# Patient Record
Sex: FEMALE | Race: WHITE | NOT HISPANIC OR LATINO | Employment: FULL TIME | URBAN - METROPOLITAN AREA
[De-identification: names, ages, dates, MRNs, and addresses within clinical notes are randomized per-mention and may not be internally consistent; named-entity substitution may affect disease eponyms.]

---

## 2017-02-09 ENCOUNTER — ALLSCRIPTS OFFICE VISIT (OUTPATIENT)
Dept: OTHER | Facility: OTHER | Age: 27
End: 2017-02-09

## 2017-05-29 ENCOUNTER — HOSPITAL ENCOUNTER (EMERGENCY)
Facility: HOSPITAL | Age: 27
Discharge: HOME | End: 2017-05-29
Attending: EMERGENCY MEDICINE | Admitting: EMERGENCY MEDICINE
Payer: COMMERCIAL

## 2017-05-29 ENCOUNTER — APPOINTMENT (EMERGENCY)
Dept: RADIOLOGY | Facility: HOSPITAL | Age: 27
End: 2017-05-29
Payer: COMMERCIAL

## 2017-05-29 VITALS
OXYGEN SATURATION: 98 % | TEMPERATURE: 99.8 F | WEIGHT: 211 LBS | HEART RATE: 117 BPM | DIASTOLIC BLOOD PRESSURE: 73 MMHG | SYSTOLIC BLOOD PRESSURE: 134 MMHG | RESPIRATION RATE: 18 BRPM | HEIGHT: 63 IN | BODY MASS INDEX: 37.39 KG/M2

## 2017-05-29 DIAGNOSIS — B34.9 VIRAL SYNDROME: Primary | ICD-10-CM

## 2017-05-29 LAB
ALBUMIN SERPL BCP-MCNC: 3.5 G/DL (ref 3.5–5)
ALP SERPL-CCNC: 83 U/L (ref 46–116)
ALT SERPL W P-5'-P-CCNC: 22 U/L (ref 12–78)
ANION GAP SERPL CALCULATED.3IONS-SCNC: 12 MMOL/L (ref 4–13)
APTT PPP: 31 SECONDS (ref 23–35)
AST SERPL W P-5'-P-CCNC: 16 U/L (ref 5–45)
BASOPHILS # BLD AUTO: 0.01 THOUSANDS/ΜL (ref 0–0.1)
BASOPHILS NFR BLD AUTO: 0 % (ref 0–1)
BILIRUB SERPL-MCNC: 0.2 MG/DL (ref 0.2–1)
BUN SERPL-MCNC: 12 MG/DL (ref 5–25)
CALCIUM SERPL-MCNC: 8.8 MG/DL (ref 8.3–10.1)
CHLORIDE SERPL-SCNC: 102 MMOL/L (ref 100–108)
CO2 SERPL-SCNC: 24 MMOL/L (ref 21–32)
CREAT SERPL-MCNC: 0.91 MG/DL (ref 0.6–1.3)
EOSINOPHIL # BLD AUTO: 0.07 THOUSAND/ΜL (ref 0–0.61)
EOSINOPHIL NFR BLD AUTO: 1 % (ref 0–6)
ERYTHROCYTE [DISTWIDTH] IN BLOOD BY AUTOMATED COUNT: 12.8 % (ref 11.6–15.1)
GFR SERPL CREATININE-BSD FRML MDRD: >60 ML/MIN/1.73SQ M
GLUCOSE SERPL-MCNC: 118 MG/DL (ref 65–140)
HCT VFR BLD AUTO: 40.2 % (ref 34.8–46.1)
HGB BLD-MCNC: 14.2 G/DL (ref 11.5–15.4)
INR PPP: 0.93 (ref 0.86–1.16)
LACTATE SERPL-SCNC: 1.1 MMOL/L (ref 0.5–2)
LYMPHOCYTES # BLD AUTO: 1.58 THOUSANDS/ΜL (ref 0.6–4.47)
LYMPHOCYTES NFR BLD AUTO: 13 % (ref 14–44)
MCH RBC QN AUTO: 32.2 PG (ref 26.8–34.3)
MCHC RBC AUTO-ENTMCNC: 35.3 G/DL (ref 31.4–37.4)
MCV RBC AUTO: 91 FL (ref 82–98)
MONOCYTES # BLD AUTO: 0.6 THOUSAND/ΜL (ref 0.17–1.22)
MONOCYTES NFR BLD AUTO: 5 % (ref 4–12)
NEUTROPHILS # BLD AUTO: 10.2 THOUSANDS/ΜL (ref 1.85–7.62)
NEUTS SEG NFR BLD AUTO: 81 % (ref 43–75)
PLATELET # BLD AUTO: 297 THOUSANDS/UL (ref 149–390)
PMV BLD AUTO: 11.1 FL (ref 8.9–12.7)
POTASSIUM SERPL-SCNC: 3.7 MMOL/L (ref 3.5–5.3)
PROT SERPL-MCNC: 7.5 G/DL (ref 6.4–8.2)
PROTHROMBIN TIME: 12.3 SECONDS (ref 12.1–14.4)
RBC # BLD AUTO: 4.41 MILLION/UL (ref 3.81–5.12)
SODIUM SERPL-SCNC: 138 MMOL/L (ref 136–145)
WBC # BLD AUTO: 12.46 THOUSAND/UL (ref 4.31–10.16)

## 2017-05-29 PROCEDURE — 85610 PROTHROMBIN TIME: CPT | Performed by: EMERGENCY MEDICINE

## 2017-05-29 PROCEDURE — 96374 THER/PROPH/DIAG INJ IV PUSH: CPT

## 2017-05-29 PROCEDURE — 96361 HYDRATE IV INFUSION ADD-ON: CPT

## 2017-05-29 PROCEDURE — 80053 COMPREHEN METABOLIC PANEL: CPT | Performed by: EMERGENCY MEDICINE

## 2017-05-29 PROCEDURE — 36415 COLL VENOUS BLD VENIPUNCTURE: CPT | Performed by: EMERGENCY MEDICINE

## 2017-05-29 PROCEDURE — 71020 HB CHEST X-RAY 2VW FRONTAL&LATL: CPT

## 2017-05-29 PROCEDURE — 87040 BLOOD CULTURE FOR BACTERIA: CPT | Performed by: EMERGENCY MEDICINE

## 2017-05-29 PROCEDURE — 85025 COMPLETE CBC W/AUTO DIFF WBC: CPT | Performed by: EMERGENCY MEDICINE

## 2017-05-29 PROCEDURE — 83605 ASSAY OF LACTIC ACID: CPT | Performed by: EMERGENCY MEDICINE

## 2017-05-29 PROCEDURE — 85730 THROMBOPLASTIN TIME PARTIAL: CPT | Performed by: EMERGENCY MEDICINE

## 2017-05-29 PROCEDURE — 99285 EMERGENCY DEPT VISIT HI MDM: CPT

## 2017-05-29 RX ORDER — LEVONORGESTREL AND ETHINYL ESTRADIOL 0.15-0.03
1 KIT ORAL DAILY
COMMUNITY
End: 2018-04-12 | Stop reason: SDUPTHER

## 2017-05-29 RX ORDER — NAPROXEN 250 MG/1
250 TABLET ORAL 2 TIMES DAILY WITH MEALS
COMMUNITY
End: 2019-02-05 | Stop reason: ALTCHOICE

## 2017-05-29 RX ORDER — ACETAMINOPHEN 500 MG
1000 TABLET ORAL EVERY 6 HOURS PRN
COMMUNITY

## 2017-05-29 RX ORDER — KETOROLAC TROMETHAMINE 30 MG/ML
15 INJECTION, SOLUTION INTRAMUSCULAR; INTRAVENOUS ONCE
Status: COMPLETED | OUTPATIENT
Start: 2017-05-29 | End: 2017-05-29

## 2017-05-29 RX ORDER — ACETAMINOPHEN 325 MG/1
650 TABLET ORAL ONCE
Status: COMPLETED | OUTPATIENT
Start: 2017-05-29 | End: 2017-05-29

## 2017-05-29 RX ADMIN — KETOROLAC TROMETHAMINE 15 MG: 30 INJECTION, SOLUTION INTRAMUSCULAR at 22:39

## 2017-05-29 RX ADMIN — SODIUM CHLORIDE 1000 ML: 0.9 INJECTION, SOLUTION INTRAVENOUS at 22:12

## 2017-05-29 RX ADMIN — ACETAMINOPHEN 650 MG: 325 TABLET ORAL at 22:20

## 2017-05-31 ENCOUNTER — ALLSCRIPTS OFFICE VISIT (OUTPATIENT)
Dept: OTHER | Facility: OTHER | Age: 27
End: 2017-05-31

## 2017-06-02 LAB
CULTURE RESULT (HISTORICAL): ABNORMAL
MISCELLANEOUS LAB TEST RESULT (HISTORICAL): ABNORMAL

## 2017-06-04 LAB — BACTERIA BLD CULT: NORMAL

## 2017-06-05 ENCOUNTER — GENERIC CONVERSION - ENCOUNTER (OUTPATIENT)
Dept: OTHER | Facility: OTHER | Age: 27
End: 2017-06-05

## 2017-09-06 ENCOUNTER — ALLSCRIPTS OFFICE VISIT (OUTPATIENT)
Dept: OTHER | Facility: OTHER | Age: 27
End: 2017-09-06

## 2017-09-11 LAB
A/G RATIO (HISTORICAL): 1.6 (ref 1.2–2.2)
ALBUMIN SERPL BCP-MCNC: 4.1 G/DL (ref 3.5–5.5)
ALP SERPL-CCNC: 80 IU/L (ref 39–117)
ALT SERPL W P-5'-P-CCNC: 19 IU/L (ref 0–32)
AST SERPL W P-5'-P-CCNC: 18 IU/L (ref 0–40)
BILIRUB SERPL-MCNC: <0.2 MG/DL (ref 0–1.2)
BUN SERPL-MCNC: 8 MG/DL (ref 6–20)
BUN/CREA RATIO (HISTORICAL): 11 (ref 9–23)
CALCIUM SERPL-MCNC: 9.4 MG/DL (ref 8.7–10.2)
CHLORIDE SERPL-SCNC: 101 MMOL/L (ref 96–106)
CO2 SERPL-SCNC: 21 MMOL/L (ref 18–29)
CREAT SERPL-MCNC: 0.71 MG/DL (ref 0.57–1)
EGFR AFRICAN AMERICAN (HISTORICAL): 134 ML/MIN/1.73
EGFR-AMERICAN CALC (HISTORICAL): 116 ML/MIN/1.73
GLUCOSE SERPL-MCNC: 98 MG/DL (ref 65–99)
POTASSIUM SERPL-SCNC: 4.4 MMOL/L (ref 3.5–5.2)
SODIUM SERPL-SCNC: 140 MMOL/L (ref 134–144)
TOT. GLOBULIN, SERUM (HISTORICAL): 2.6 G/DL (ref 1.5–4.5)
TOTAL PROTEIN (HISTORICAL): 6.7 G/DL (ref 6–8.5)

## 2017-09-12 LAB
BASOPHILS # BLD AUTO: 0 %
BASOPHILS # BLD AUTO: 0 X10E3/UL (ref 0–0.2)
D-DIMER QUANTITATIVE (HISTORICAL): 0.47 MG/L FEU (ref 0–0.49)
DEPRECATED RDW RBC AUTO: 13.2 % (ref 12.3–15.4)
EOSINOPHIL # BLD AUTO: 0.1 X10E3/UL (ref 0–0.4)
EOSINOPHIL # BLD AUTO: 1 %
HCT VFR BLD AUTO: 41.1 % (ref 34–46.6)
HGB BLD-MCNC: 14.1 G/DL (ref 11.1–15.9)
IMM.GRANULOCYTES (CD4/8) (HISTORICAL): 0 %
IMM.GRANULOCYTES (CD4/8) (HISTORICAL): 0 X10E3/UL (ref 0–0.1)
LYMPHOCYTES # BLD AUTO: 2.4 X10E3/UL (ref 0.7–3.1)
LYMPHOCYTES # BLD AUTO: 28 %
MAGNESIUM SERPL-MCNC: 2 MG/DL (ref 1.6–2.3)
MCH RBC QN AUTO: 31.8 PG (ref 26.6–33)
MCHC RBC AUTO-ENTMCNC: 34.3 G/DL (ref 31.5–35.7)
MCV RBC AUTO: 93 FL (ref 79–97)
MONOCYTES # BLD AUTO: 0.4 X10E3/UL (ref 0.1–0.9)
MONOCYTES (HISTORICAL): 4 %
NEUTROPHILS # BLD AUTO: 5.6 X10E3/UL (ref 1.4–7)
NEUTROPHILS # BLD AUTO: 67 %
PLATELET # BLD AUTO: 358 X10E3/UL (ref 150–379)
RBC (HISTORICAL): 4.43 X10E6/UL (ref 3.77–5.28)
WBC # BLD AUTO: 8.5 X10E3/UL (ref 3.4–10.8)

## 2017-09-13 LAB
T4 FREE SERPL-MCNC: 9.4 UG/DL (ref 4.5–12)
TSH SERPL DL<=0.05 MIU/L-ACNC: 5.11 UIU/ML (ref 0.45–4.5)

## 2017-09-14 ENCOUNTER — GENERIC CONVERSION - ENCOUNTER (OUTPATIENT)
Dept: OTHER | Facility: OTHER | Age: 27
End: 2017-09-14

## 2017-11-17 ENCOUNTER — GENERIC CONVERSION - ENCOUNTER (OUTPATIENT)
Dept: OTHER | Facility: OTHER | Age: 27
End: 2017-11-17

## 2017-11-17 LAB — TSH SERPL DL<=0.05 MIU/L-ACNC: 2.56 UIU/ML (ref 0.45–4.5)

## 2018-01-10 NOTE — PROGRESS NOTES
Assessment    1  Bicornuate uterus (752 34) (Q51 3)   2  Encounter for initial prescription of contraceptive pills (V25 01) (Z30 011)   3  Encounter for routine gynecological examination with Papanicolaou smear of cervix   (V72 31,V76 2) (U86 754,L23  4)    Plan  Encounter for initial prescription of contraceptive pills    · Levonorgest-Eth Estrad 91-Day 0 15-0 03 &0 01 MG Oral Tablet (Seasonique); 1  TABLET DAILY   Rx By: Cristal Kim; Dispense: 0 Days ; #:1 Tablet; Refill: 3; For: Encounter for initial prescription of contraceptive pills; CONNOR = N; Sent To: [address]   · Follow-up visit in 3 months Evaluation and Treatment  Follow-up  Status: Hold For -  Scheduling  Requested for: [id number]   Ordered; For: Encounter for initial prescription of contraceptive pills; Ordered By: Cristal Kim Performed:  Due: 66VME2958  Encounter for routine gynecological examination with Papanicolaou smear of cervix    · Follow-up visit in 1 year Evaluation and Treatment  Follow-up  Status: Hold For -  Scheduling  Requested for: 29Jan2016   Ordered; For: Encounter for routine gynecological examination with Papanicolaou smear of cervix; Ordered By: Cristal Kim Performed:  Due: 97GKX9055    Discussion/Summary    1  Yearly exam-Pap smear done, self breast awareness reviewed  2  Left breast skin rash/cyst-appears to be resolving  Today, only a faint reddish discoloration is noted consistent with resolving inclusion cyst  No suspicious findings are noted  No intervention is recommended  3  Contraception-patient interested in starting contraception  She uses condoms intermittently with her  at this time  She became pregnant on NuvaRing in the past and had significant hormonal symptoms on the patch  We discussed options and the contraceptive options she was given  She has a history of bicornuate uterus, so IUD is not optimal  After some discussion, she wishes to start OCP, 91 day pill  The patient was counseled about risks of birth control pills  She denies any contraindications, including risks of blood clots, liver disease, hormone dependent tumors, migraine headaches, or hypertension  ACHES symptoms were discussed  All questions were answered  The patient will start the pill as directed, and return in 3 months time for pill check  Electronic prescription for Seasonale 1 pack refill 3 was sent to local pharmacy  She will return in 3 months as noted above for pill check  Chief Complaint    1  Visit For: Preventive General Multisystem Exam    History of Present Illness  HPI: The patient was seen today for yearly exam  She denies any complaints  The red spot on her breast seems to have faded, but is still slightly present  She is interested in contraceptive options  Review of Systems    Constitutional: No fever, no chills, feels well, no tiredness, no recent weight gain or loss  ENT: no ear ache, no loss of hearing, no nosebleeds or nasal discharge, no sore throat or hoarseness  Cardiovascular: no complaints of slow or fast heart rate, no chest pain, no palpitations, no leg claudication or lower extremity edema  Respiratory: no complaints of shortness of breath, no wheezing, no dyspnea on exertion, no orthopnea or PND  Breasts: no complaints of breast pain, breast lump or nipple discharge  Gastrointestinal: no complaints of abdominal pain, no constipation, no nausea or diarrhea, no vomiting, no bloody stools  Genitourinary: no complaints of dysuria, no incontinence, no pelvic pain, no dysmenorrhea, no vaginal discharge or abnormal vaginal bleeding  Musculoskeletal: no complaints of arthralgia, no myalgia, no joint swelling or stiffness, no limb pain or swelling  Integumentary: no complaints of skin rash or lesion, no itching or dry skin, no skin wounds  Neurological: no complaints of headache, no confusion, no numbness or tingling, no dizziness or fainting  Active Problems    1   History of self breast exam   2  Skin rash (782 1) (R21)    Past Medical History    · History of  7   · History of miscarriage (V13 29) (Z87 59)    The active problems and past medical history were reviewed and updated today  Surgical History    · History of  Section Low Transverse   · History of Dilation And Curettage    Family History    · Family history of skin cancer (V16 8) (Z80 8)    · Family history of diabetes mellitus (V18 0) (Z83 3)    Social History    · Always uses seat belt   · Caffeine use (V49 89) (F15 90)   ·    · Never a smoker   · No alcohol use   · No drug use   · No Islam beliefs   · Three children    Current Meds   1  No Reported Medications Recorded    Allergies    1  No Known Drug Allergies    Vitals   Recorded: 61UCG9438 16:37MC   Systolic 922, RUE, Sitting   Diastolic 64, RUE, Sitting   Height 5 ft 1 5 in   Weight 212 lb    BMI Calculated 39 41   BSA Calculated 1 95   LMP [id number]     Physical Exam    Constitutional   General appearance: No acute distress, well appearing and well nourished  Neck   Neck: Normal, supple, trachea midline, no masses  Thyroid: Normal, no thyromegaly  Genitourinary   External genitalia: Normal and no lesions appreciated  Vagina: Normal, no lesions or dryness appreciated  Urethra: Normal     Urethral meatus: Normal     Bladder: Normal, soft, non-tender and no prolapse or masses appreciated  Cervix: Normal, no palpable masses  without lesions or discharge or cervicitis  No CMT  Uterus: Normal, non-tender, not enlarged, and no palpable masses  midposition, top normal size, nontender, without mass  Adnexa/parametria: Normal, non-tender and no fullness or masses appreciated  Anus, perineum, and rectum: Normal sphincter tone, no masses, and no prolapse  Chest   Breasts: Normal and no dimpling or skin changes noted  very faint red discoloration is noted at 10:00 on the left breast, consistent with resolving inclusion cyst  No other suspicious findings are noted  Abdomen   Abdomen: Normal, non-tender, and no organomegaly noted  Liver and spleen: No hepatomegaly or splenomegaly  Examination for hernias: No hernias appreciated  Lymphatic   Palpation of lymph nodes in neck, axillae, groin and/or other locations: No lymphadenopathy or masses noted  Skin   Skin and subcutaneous tissue: Normal skin turgor and no rashes      Palpation of skin and subcutaneous tissue: Normal     Psychiatric   Orientation to person, place, and time: Normal     Mood and affect: Normal        Signatures   Electronically signed by : VINICIUS Richardson ; Jan 29 2016 11:44AM EST                       (Author)

## 2018-01-11 NOTE — RESULT NOTES
Verified Results  (1) TSH [id number] 11:48AM Joon Magana     Test Name Result Flag Reference   TSH 2 560 uIU/mL  0 450-4 500

## 2018-01-13 VITALS
OXYGEN SATURATION: 98 % | BODY MASS INDEX: 39.34 KG/M2 | HEIGHT: 63 IN | SYSTOLIC BLOOD PRESSURE: 132 MMHG | HEART RATE: 98 BPM | DIASTOLIC BLOOD PRESSURE: 84 MMHG | WEIGHT: 222 LBS

## 2018-01-14 VITALS
DIASTOLIC BLOOD PRESSURE: 64 MMHG | BODY MASS INDEX: 39.16 KG/M2 | HEIGHT: 63 IN | WEIGHT: 221 LBS | SYSTOLIC BLOOD PRESSURE: 122 MMHG

## 2018-01-14 VITALS
OXYGEN SATURATION: 98 % | WEIGHT: 210 LBS | RESPIRATION RATE: 16 BRPM | SYSTOLIC BLOOD PRESSURE: 130 MMHG | BODY MASS INDEX: 37.21 KG/M2 | HEIGHT: 63 IN | HEART RATE: 118 BPM | DIASTOLIC BLOOD PRESSURE: 70 MMHG

## 2018-01-14 NOTE — RESULT NOTES
Verified Results  (1) THROAT CULTURE (CULTURE, UPPER RESPIRATORY) [id number] 12:00AM Rohini Antonchet     Test Name Result Flag Reference   Upper Respiratory Culture Final report A    Result 1 Comment A    Beta hemolytic Streptococcus, group A  Heavy growth  Penicillin and ampicillin are drugs of choice for treatment of  beta-hemolytic streptococcal infections  Susceptibility testing of  penicillins and other beta-lactam agents approved by the FDA for  treatment of beta-hemolytic streptococcal infections need not be  performed routinely because nonsusceptible isolates are extremely  rare in any beta-hemolytic streptococcus and have not been reported  for Streptococcus pyogenes (group A)   (CLSI 2011)

## 2018-01-14 NOTE — RESULT NOTES
Message   Recorded as Task   Date: 06/05/2017 09:12 AM, Created By: Mike Bunch   Task Name: Call Patient with results   Assigned To:  Yeison Rodriguez   Regarding Patient: Kindra Saha, Status: Active   CommentMurphy Dust - 05 Jun 2017 9:12 AM     Patient Phone: [phone]    culture positive Strep- finish Amox   Haley Hernandez - 05 Jun 2017 9:18 AM     TASK EDITED  Lima Tashia MSG LEFT C#        Signatures   Electronically signed by : Yomaira Kenny, ; Jun 5 2017  9:19AM EST                       (Author)

## 2018-01-17 NOTE — RESULT NOTES
Verified Results  (1) COMPREHENSIVE METABOLIC PANEL [id number] 57:96AQ Edelmira Brown     Test Name Result Flag Reference   Glucose, Serum 98 mg/dL  65-99   BUN 8 mg/dL  6-20   Creatinine, Serum 0 71 mg/dL  0 57-1 00   BUN/Creatinine Ratio 11  9-23   Sodium, Serum 140 mmol/L  134-144   Potassium, Serum 4 4 mmol/L  3 5-5 2   Chloride, Serum 101 mmol/L     Carbon Dioxide, Total 21 mmol/L  18-29   Calcium, Serum 9 4 mg/dL  8 7-10 2   Protein, Total, Serum 6 7 g/dL  6 0-8 5   Albumin, Serum 4 1 g/dL  3 5-5 5   Globulin, Total 2 6 g/dL  1 5-4 5   A/G Ratio 1 6  1 2-2 2   Bilirubin, Total <0 2 mg/dL  0 0-1 2   Alkaline Phosphatase, S 80 IU/L     AST (SGOT) 18 IU/L  0-40   ALT (SGPT) 19 IU/L  0-32   eGFR If NonAfricn Am 116 mL/min/1 73  >59   eGFR If Africn Am 134 mL/min/1 73  >59     (1) CBC/PLT/DIFF [id number] 11:11AM Edelmira Ridcarito     Test Name Result Flag Reference   WBC 8 5 x10E3/uL  3 4-10 8   RBC 4 43 x10E6/uL  3 77-5 28   Hemoglobin 14 1 g/dL  11 1-15 9   Hematocrit 41 1 %  34 0-46  6   MCV 93 fL  79-97   MCH 31 8 pg  26 6-33 0   MCHC 34 3 g/dL  31 5-35 7   RDW 13 2 %  12 3-15 4   Platelets 443 T83M0/VB  150-379   Neutrophils 67 %     Lymphs 28 %     Monocytes 4 %     Eos 1 %     Basos 0 %     Neutrophils (Absolute) 5 6 x10E3/uL  1 4-7 0   Lymphs (Absolute) 2 4 x10E3/uL  0 7-3 1   Monocytes(Absolute) 0 4 x10E3/uL  0 1-0 9   Eos (Absolute) 0 1 x10E3/uL  0 0-0 4   Baso (Absolute) 0 0 x10E3/uL  0 0-0 2   Immature Granulocytes 0 %     Immature Grans (Abs) 0 0 x10E3/uL  0 0-0 1     (1) D-DIMER [id number] 11:11AM Edelmira Brown     Test Name Result Flag Reference   D-Dimer 0 47 mg/L FEU  0 00-0 49   According to the assay 's published package insert, a  normal (<0 50 mg/L FEU) D-dimer result in conjunction with a non-high  clinical probability assessment, excludes deep vein thrombosis (DVT)  and pulmonary embolism (PE) with high sensitivity    D-dimer values increase with age and this can make VTE exclusion of  an older population difficult  To address this, the [facility], based on best available evidence and recent guidelines,  recommends that clinicians use age-adjusted D-dimer thresholds in  patients greater than 48years of age with: a) a low probability of  PE who do not meet all Pulmonary Embolism Rule Out Criteria, or  b) in those with intermediate probability of PE  The formula for an  age-adjusted D-dimer cut-off is "age/100"  For example, a 61year old  patient would have an age-adjusted cut-off of 0 60 mg/L FEU and an  [de-identified]year old 0 80 mg/L FEU       (1) TSH WITH FT4 REFLEX [id number] 11:11AM Ernesto Slocumb     Test Name Result Flag Reference   TSH 5 110 uIU/mL H 0 450-4 500   Thyroxine (T4) 9 4 ug/dL  4 5-12 0     (1) MAGNESIUM [id number] 11:11AM Ernesto Slocumb     Test Name Result Flag Reference   Magnesium, Serum 2 0 mg/dL  1 6-2 3

## 2018-03-30 ENCOUNTER — OFFICE VISIT (OUTPATIENT)
Dept: FAMILY MEDICINE CLINIC | Facility: CLINIC | Age: 28
End: 2018-03-30
Payer: COMMERCIAL

## 2018-03-30 VITALS
SYSTOLIC BLOOD PRESSURE: 120 MMHG | TEMPERATURE: 98 F | BODY MASS INDEX: 37.21 KG/M2 | WEIGHT: 210 LBS | HEIGHT: 63 IN | DIASTOLIC BLOOD PRESSURE: 82 MMHG

## 2018-03-30 DIAGNOSIS — J02.9 ACUTE PHARYNGITIS, UNSPECIFIED ETIOLOGY: Primary | ICD-10-CM

## 2018-03-30 PROCEDURE — 3725F SCREEN DEPRESSION PERFORMED: CPT | Performed by: NURSE PRACTITIONER

## 2018-03-30 PROCEDURE — 99213 OFFICE O/P EST LOW 20 MIN: CPT | Performed by: NURSE PRACTITIONER

## 2018-03-30 PROCEDURE — 3008F BODY MASS INDEX DOCD: CPT | Performed by: NURSE PRACTITIONER

## 2018-03-30 RX ORDER — AMOXICILLIN AND CLAVULANATE POTASSIUM 875; 125 MG/1; MG/1
1 TABLET, FILM COATED ORAL EVERY 12 HOURS SCHEDULED
Qty: 20 TABLET | Refills: 0 | Status: SHIPPED | OUTPATIENT
Start: 2018-03-30 | End: 2018-04-09

## 2018-03-30 NOTE — PATIENT INSTRUCTIONS
Take OTC cold medications to treat symptoms  If symptoms worsen start Augmentin as directed   Augusto or return with any problems or concerns

## 2018-03-30 NOTE — PROGRESS NOTES
[facility]        NAME: Joshua Doyle is a 32 y o  female  : 1990    MRN: [id number]  DATE: 2018  TIME: 2:27 PM    Assessment and Plan   Acute pharyngitis, unspecified etiology [J02 9]  1  Acute pharyngitis, unspecified etiology  amoxicillin-clavulanate (AUGMENTIN) 875-125 mg per tablet         Patient Instructions     Patient Instructions   Take OTC cold medications to treat symptoms  If symptoms worsen start Augmentin as directed  Augusto or return with any problems or concerns          Chief Complaint     Chief Complaint   Patient presents with    Sore Throat     1-2 days         History of Present Illness       Sore throat x2 days  Sore Throat    Pertinent negatives include no abdominal pain, congestion, coughing, diarrhea, headaches, neck pain, shortness of breath, stridor or vomiting  Review of Systems   Review of Systems   Constitutional: Negative for activity change, diaphoresis, fatigue and fever  HENT: Positive for sore throat  Negative for congestion, facial swelling, hearing loss, rhinorrhea, sinus pain, sinus pressure, sneezing and voice change  Eyes: Negative for discharge and visual disturbance  Respiratory: Negative for cough, choking, chest tightness, shortness of breath, wheezing and stridor  Cardiovascular: Negative for chest pain, palpitations and leg swelling  Gastrointestinal: Negative for abdominal distention, abdominal pain, constipation, diarrhea, nausea and vomiting  Endocrine: Negative for polydipsia, polyphagia and polyuria  Genitourinary: Negative for difficulty urinating, dysuria, frequency and urgency  Musculoskeletal: Negative for arthralgias, back pain, gait problem, joint swelling, myalgias, neck pain and neck stiffness  Skin: Negative for color change, rash and wound  Neurological: Negative for dizziness, syncope, speech difficulty, weakness, light-headedness and headaches     Hematological: Negative for adenopathy  Does not bruise/bleed easily  Psychiatric/Behavioral: Negative for agitation, behavioral problems, confusion, hallucinations, sleep disturbance and suicidal ideas  The patient is not nervous/anxious  Current Medications       Current Outpatient Prescriptions:     acetaminophen (TYLENOL) 500 mg tablet, Take 1,000 mg by mouth every 6 (six) hours as needed for mild pain, Disp: , Rfl:     amoxicillin-clavulanate (AUGMENTIN) 875-125 mg per tablet, Take 1 tablet by mouth every 12 (twelve) hours for 10 days, Disp: 20 tablet, Rfl: 0    levonorgestrel-ethinyl estradiol (SEASONALE) 0 15-0 03 MG per tablet, Take 1 tablet by mouth daily, Disp: , Rfl:     naproxen (NAPROSYN) 250 mg tablet, Take 250 mg by mouth 2 (two) times a day with meals, Disp: , Rfl:     Current Allergies     Allergies as of 2018    (No Known Allergies)            The following portions of the patient's history were reviewed and updated as appropriate: allergies, current medications, past family history, past medical history, past social history, past surgical history and problem list      No past medical history on file  Past Surgical History:   Procedure Laterality Date     SECTION      x 3    DILATION AND CURETTAGE OF UTERUS         Family History   Problem Relation Age of Onset    Skin cancer Mother     Diabetes Family          Medications have been verified  Objective   /82   Temp 98 °F (36 7 °C)   Ht 5' 3" (1 6 m)   Wt 95 3 kg (210 lb)   BMI 37 20 kg/m²        Physical Exam     Physical Exam   Constitutional: She is oriented to person, place, and time  She appears well-developed and well-nourished  No distress  HENT:   Head: Normocephalic and atraumatic  Right Ear: Tympanic membrane, external ear and ear canal normal    Left Ear: Tympanic membrane, external ear and ear canal normal    Nose: No rhinorrhea   Right sinus exhibits no maxillary sinus tenderness and no frontal sinus tenderness  Left sinus exhibits no maxillary sinus tenderness and no frontal sinus tenderness  Mouth/Throat: Uvula is midline and mucous membranes are normal  Posterior oropharyngeal erythema present  No oropharyngeal exudate or tonsillar abscesses  Cardiovascular: Normal rate, regular rhythm and normal heart sounds  Exam reveals no gallop and no friction rub  No murmur heard  Pulmonary/Chest: Effort normal and breath sounds normal  No respiratory distress  She has no wheezes  She has no rales  She exhibits no tenderness  Lymphadenopathy:        Head (right side): No tonsillar adenopathy present  Head (left side): No tonsillar adenopathy present  Neurological: She is alert and oriented to person, place, and time  Psychiatric: She has a normal mood and affect  Her behavior is normal  Judgment and thought content normal    Nursing note and vitals reviewed

## 2018-04-11 DIAGNOSIS — E03.9 HYPOTHYROIDISM, UNSPECIFIED TYPE: Primary | ICD-10-CM

## 2018-04-11 RX ORDER — LEVOTHYROXINE SODIUM 0.05 MG/1
1 TABLET ORAL DAILY
COMMUNITY
Start: 2017-09-14 | End: 2018-04-11 | Stop reason: SDUPTHER

## 2018-04-12 ENCOUNTER — ANNUAL EXAM (OUTPATIENT)
Dept: OBGYN CLINIC | Facility: CLINIC | Age: 28
End: 2018-04-12
Payer: COMMERCIAL

## 2018-04-12 VITALS
DIASTOLIC BLOOD PRESSURE: 80 MMHG | BODY MASS INDEX: 37.32 KG/M2 | HEIGHT: 63 IN | WEIGHT: 210.6 LBS | SYSTOLIC BLOOD PRESSURE: 122 MMHG

## 2018-04-12 DIAGNOSIS — Z01.419 WOMEN'S ANNUAL ROUTINE GYNECOLOGICAL EXAMINATION: Primary | ICD-10-CM

## 2018-04-12 DIAGNOSIS — Z12.4 CERVICAL CANCER SCREENING: ICD-10-CM

## 2018-04-12 DIAGNOSIS — Q51.3 BICORNUATE UTERUS: ICD-10-CM

## 2018-04-12 DIAGNOSIS — Z30.41 ENCOUNTER FOR SURVEILLANCE OF CONTRACEPTIVE PILLS: ICD-10-CM

## 2018-04-12 PROBLEM — M79.661 BILATERAL CALF PAIN: Status: RESOLVED | Noted: 2017-09-06 | Resolved: 2018-04-12

## 2018-04-12 PROBLEM — J03.90 EXUDATIVE TONSILLITIS: Status: RESOLVED | Noted: 2017-05-31 | Resolved: 2018-04-12

## 2018-04-12 PROBLEM — R53.81 MALAISE AND FATIGUE: Status: RESOLVED | Noted: 2017-09-06 | Resolved: 2018-04-12

## 2018-04-12 PROBLEM — M79.662 BILATERAL CALF PAIN: Status: RESOLVED | Noted: 2017-09-06 | Resolved: 2018-04-12

## 2018-04-12 PROBLEM — E03.9 HYPOTHYROIDISM: Status: ACTIVE | Noted: 2017-09-14

## 2018-04-12 PROBLEM — R53.81 MALAISE AND FATIGUE: Status: ACTIVE | Noted: 2017-09-06

## 2018-04-12 PROBLEM — R53.83 MALAISE AND FATIGUE: Status: RESOLVED | Noted: 2017-09-06 | Resolved: 2018-04-12

## 2018-04-12 PROBLEM — J03.90 EXUDATIVE TONSILLITIS: Status: ACTIVE | Noted: 2017-05-31

## 2018-04-12 PROBLEM — R53.83 MALAISE AND FATIGUE: Status: ACTIVE | Noted: 2017-09-06

## 2018-04-12 PROBLEM — M79.661 BILATERAL CALF PAIN: Status: ACTIVE | Noted: 2017-09-06

## 2018-04-12 PROBLEM — M79.662 BILATERAL CALF PAIN: Status: ACTIVE | Noted: 2017-09-06

## 2018-04-12 PROCEDURE — 99395 PREV VISIT EST AGE 18-39: CPT | Performed by: OBSTETRICS & GYNECOLOGY

## 2018-04-12 RX ORDER — LEVONORGESTREL AND ETHINYL ESTRADIOL 0.15-0.03
1 KIT ORAL DAILY
Qty: 91 TABLET | Refills: 3 | Status: SHIPPED | OUTPATIENT
Start: 2018-04-12

## 2018-04-12 RX ORDER — LEVOTHYROXINE SODIUM 0.05 MG/1
50 TABLET ORAL DAILY
Qty: 30 TABLET | Refills: 2 | Status: SHIPPED | OUTPATIENT
Start: 2018-04-12 | End: 2019-01-25 | Stop reason: SDUPTHER

## 2018-04-12 NOTE — PATIENT INSTRUCTIONS
Birth Control Pills   WHAT YOU NEED TO KNOW:   What are birth control pills? Birth control pills are also called oral contraceptives, or the pill  It is medicine that helps prevent pregnancy  Birth control pills work by preventing ovulation  Ovulation is when the ovaries make and release an egg cell each month  If this egg gets fertilized by sperm, pregnancy occurs  Birth control pills may also help to prevent pregnancy by keeping sperm from fertilizing an egg  What may be done before I can start taking birth control pills? You need to see your healthcare provider to get a prescription  Any of the following may be done before your healthcare provider gives you a prescription:  · Your healthcare provider will ask you about diseases and illnesses you have had in the past  He will check your risk for blood clots, heart conditions, or stroke  He will also check your blood pressure, and may do a breast and pelvic exam  A Pap smear may also be done during the pelvic exam  This is a test to make sure you do not have abnormal changes on your cervix  You may need other tests, such as a urine test, to make sure you are not pregnant  · Your healthcare provider will ask if you take any medicines and if you smoke  Smoking increases your risk for stroke, heart attack, or a blood clot in your lungs  If you smoke, you should not take certain kinds of birth control pills  What are the advantages of birth control pills? When birth control pills are used correctly, the chances of getting pregnant are very low  Birth control pills may help decrease bleeding and pain during your monthly period  They may also help prevent cancer of the uterus and ovaries  What are the disadvantages of birth control pills? You may have sudden changes in your mood or feelings while you take birth control pills  You may have nausea and decreased sex drive  You may have an increased appetite and rapid weight gain   You may also have bleeding in between periods, less frequent periods, vaginal dryness, and breast pain  Birth control pills will not protect you from sexually transmitted infections  Rarely, some birth control pills can increase your risk for a blood clot  This may become life-threatening  What should I do if I decide I want to get pregnant? If you are planning to have a baby, ask your healthcare provider when you may stop taking your birth control pills  It may take some time for you to start ovulating again  Ask your healthcare provider for more information about pregnancy after birth control pills  When should I start taking birth control pills after I have a baby? If you are not breastfeeding, you may start taking birth control pills 3 weeks after you give birth  You may be able to take certain types of birth control pills if you are breastfeeding  These pills can be started from 6 weeks to 6 months after you give birth  Ask your healthcare provider for more information about when to start taking birth control pills after you give birth  When should I contact my healthcare provider? · You have forgotten to take a birth control pill  · You have mood changes, such as depression, since starting birth control pills  · You have nausea or you are vomiting  · You have severe abdominal pain  · You missed a period and have questions or concerns about being pregnant  · You still have bleeding 4 months after taking birth control pills correctly  · You have questions or concerns about your condition or care  When should I seek immediate care or call 911? · Your arm or leg feels warm, tender, and painful  It may look swollen and red  · You feel lightheaded, short of breath, and have chest pain  · You cough up blood      · You have any of the following signs of a stroke:      ¨ Numbness or drooping on one side of your face     ¨ Weakness in an arm or leg    ¨ Confusion or difficulty speaking    ¨ Dizziness, a severe headache, or vision loss    · You have severe pain, numbness, or swelling in your arms or legs  CARE AGREEMENT:   You have the right to help plan your care  Learn about your health condition and how it may be treated  Discuss treatment options with your caregivers to decide what care you want to receive  You always have the right to refuse treatment  The above information is an  only  It is not intended as medical advice for individual conditions or treatments  Talk to your doctor, nurse or pharmacist before following any medical regimen to see if it is safe and effective for you  © 2017 [facility] Information is for End User's use only and may not be sold, redistributed or otherwise used for commercial purposes  All illustrations and images included in CareNotes® are the copyrighted property of A D A M [employer] Inc  or [employer]

## 2018-04-12 NOTE — PROGRESS NOTES
Assessment/Plan:  1  Yearly exam-Pap smear done, self-breast awareness reviewed  2  Contraception-patient continues on Seasonale or its equivalent  Electronic prescription for 1 pack refill 3 was sent to local pharmacy  3   History of bicornuate uterus-patient with history of multiple miscarriages in the past   She denies any current symptoms  4   History of breast/skin rash/pelvic cramping-all resolved  Cramping has improved on OCP  She will follow-up in 1 year or as needed  No problem-specific Assessment & Plan notes found for this encounter  There are no diagnoses linked to this encounter  Subjective:      Patient ID: Brittany Bear is a 32 y o  female  Patient was seen today for yearly exam   Please see assessment plan for details  The following portions of the patient's history were reviewed and updated as appropriate: allergies, current medications, past family history, past medical history, past social history, past surgical history and problem list     Review of Systems   Constitutional: Negative for chills, diaphoresis, fatigue and fever  Respiratory: Negative for apnea, cough, chest tightness, shortness of breath and wheezing  Cardiovascular: Negative for chest pain, palpitations and leg swelling  Gastrointestinal: Negative for abdominal distention, abdominal pain, anal bleeding, constipation, diarrhea, nausea, rectal pain and vomiting  Genitourinary: Negative for difficulty urinating, dyspareunia, dysuria, frequency, hematuria, menstrual problem, pelvic pain, urgency, vaginal bleeding, vaginal discharge and vaginal pain  Musculoskeletal: Negative for arthralgias, back pain and myalgias  Skin: Negative for color change and rash  Neurological: Negative for dizziness, syncope, light-headedness, numbness and headaches  Hematological: Negative for adenopathy  Does not bruise/bleed easily     Psychiatric/Behavioral: Negative for dysphoric mood and sleep disturbance  The patient is not nervous/anxious  Objective:      /80 (BP Location: Right arm, Patient Position: Sitting, Cuff Size: Adult)   Ht 5' 3" (1 6 m)   Wt 95 5 kg (210 lb 9 6 oz)   LMP 02/01/2018 (Approximate)   BMI 37 31 kg/m²          Physical Exam    Objective      /80 (BP Location: Right arm, Patient Position: Sitting, Cuff Size: Adult)   Ht 5' 3" (1 6 m)   Wt 95 5 kg (210 lb 9 6 oz)   LMP 02/01/2018 (Approximate)   BMI 37 31 kg/m²     General:   alert and oriented, in no acute distress, alert, appears stated age and cooperative   Neck: normal to inspection and palpation   Breast: normal appearance, no masses or tenderness   Heart:    Lungs:    Abdomen: soft, non-tender, without masses or organomegaly   Vulva: Within normal limits   Vagina: Without lesions orDischarge or erythema noted   Cervix: Without lesions or discharge or cervicitis    No Cervical motion tenderness   Uterus: top normal size, anteverted, non-tender   Adnexa: no mass, fullness, tenderness   Rectum: negative

## 2018-04-15 LAB
CYTOLOGIST CVX/VAG CYTO: NORMAL
DX ICD CODE: NORMAL
OTHER STN SPEC: NORMAL
OTHER STN SPEC: NORMAL
PATH REPORT.FINAL DX SPEC: NORMAL
SL AMB NOTE:: NORMAL
SL AMB SPECIMEN ADEQUACY: NORMAL
SL AMB TEST METHODOLOGY: NORMAL

## 2019-01-25 DIAGNOSIS — E03.9 HYPOTHYROIDISM, UNSPECIFIED TYPE: ICD-10-CM

## 2019-01-26 RX ORDER — LEVOTHYROXINE SODIUM 0.05 MG/1
50 TABLET ORAL DAILY
Qty: 30 TABLET | Refills: 0 | Status: SHIPPED | OUTPATIENT
Start: 2019-01-26 | End: 2019-02-05 | Stop reason: SDUPTHER

## 2019-02-05 ENCOUNTER — OFFICE VISIT (OUTPATIENT)
Dept: FAMILY MEDICINE CLINIC | Facility: CLINIC | Age: 29
End: 2019-02-05
Payer: COMMERCIAL

## 2019-02-05 VITALS
HEART RATE: 79 BPM | BODY MASS INDEX: 36.86 KG/M2 | SYSTOLIC BLOOD PRESSURE: 126 MMHG | HEIGHT: 63 IN | OXYGEN SATURATION: 98 % | WEIGHT: 208 LBS | DIASTOLIC BLOOD PRESSURE: 84 MMHG

## 2019-02-05 DIAGNOSIS — Z00.00 ENCOUNTER FOR WELL ADULT EXAM WITHOUT ABNORMAL FINDINGS: ICD-10-CM

## 2019-02-05 DIAGNOSIS — E03.9 HYPOTHYROIDISM, UNSPECIFIED TYPE: Primary | ICD-10-CM

## 2019-02-05 DIAGNOSIS — Z00.00 ANNUAL PHYSICAL EXAM: ICD-10-CM

## 2019-02-05 PROCEDURE — 99395 PREV VISIT EST AGE 18-39: CPT | Performed by: NURSE PRACTITIONER

## 2019-02-05 RX ORDER — LEVOTHYROXINE SODIUM 0.05 MG/1
50 TABLET ORAL DAILY
Qty: 30 TABLET | Refills: 3 | Status: SHIPPED | OUTPATIENT
Start: 2019-02-05 | End: 2019-02-05 | Stop reason: SDUPTHER

## 2019-02-05 RX ORDER — LEVOTHYROXINE SODIUM 0.05 MG/1
50 TABLET ORAL DAILY
Qty: 30 TABLET | Refills: 3 | Status: SHIPPED | OUTPATIENT
Start: 2019-02-05 | End: 2019-07-18 | Stop reason: SDUPTHER

## 2019-02-05 NOTE — PROGRESS NOTES
ADULT ANNUAL PHYSICAL  [facility] - [address]    NAME: Faye Arreola  AGE: 25 y o  SEX: female  : 1990      DATE: 2019     Assessment and Plan:     Problem List Items Addressed This Visit     Hypothyroidism - Primary    Relevant Medications    levothyroxine 50 mcg tablet    Other Relevant Orders    TSH, 3rd generation with Free T4 reflex      Other Visit Diagnoses     Encounter for well adult exam without abnormal findings        Relevant Orders    Lipid panel    Comprehensive metabolic panel    Annual physical exam              Health maintenance and preventative care screenings were discussed with patient today  Appropriate education was printed on patient's after visit summary  · Discussed risks/benefits of screening for cervical cancer and high cholesterol  Patient is up-to-date with their preventive screenings  · Immunizations were reviewed: patient is up-to-date with her immunizations  Counseling:  · Dental Health: discussed importance of regular tooth brushing, flossing, and dental visits  No Follow-up on file  Chief Complaint:     Chief Complaint   Patient presents with    Physical Exam     med check       History of Present Illness:     Adult Annual Physical   Patient here for a comprehensive physical exam  The patient reports no problems  Diet and Physical Activity  · Diet/Nutrition: well balanced diet, limited junk food, low calorie diet and consuming 3-5 servings of fruits/vegetables daily  · Weight concerns: patient has class 2 obesity (BMI 35 0-39 9)  · Exercise: 5-7 times a week on average  Depression Screening  PHQ-9 Depression Screening    PHQ-9:    Frequency of the following problems over the past two weeks:       Little interest or pleasure in doing things:  0 - not at all  Feeling down, depressed, or hopeless:  0 - not at all  PHQ-2 Score:  0       General Health  · Sleep: gets 4-6 hours of sleep on average  · Hearing: normal - bilateral   · Vision: no vision problems and goes for regular eye exams  · Dental: regular dental visits  /GYN Health  · Last menstrual period: 19  · Contraceptive method: oral contraceptives  · History of STDs?: no      Review of Systems:     Review of Systems   Constitutional: Negative for activity change, diaphoresis, fatigue and fever  HENT: Negative for congestion, facial swelling, hearing loss, rhinorrhea, sinus pain, sinus pressure, sneezing, sore throat and voice change  Eyes: Negative for discharge and visual disturbance  Respiratory: Negative for cough, choking, chest tightness, shortness of breath, wheezing and stridor  Cardiovascular: Negative for chest pain, palpitations and leg swelling  Gastrointestinal: Negative for abdominal distention, abdominal pain, constipation, diarrhea, nausea and vomiting  Endocrine: Negative for polydipsia, polyphagia and polyuria  Genitourinary: Negative for difficulty urinating, dysuria, frequency and urgency  Musculoskeletal: Negative for arthralgias, back pain, gait problem, joint swelling, myalgias, neck pain and neck stiffness  Skin: Negative for color change, rash and wound  Neurological: Negative for dizziness, syncope, speech difficulty, weakness, light-headedness and headaches  Hematological: Negative for adenopathy  Does not bruise/bleed easily  Psychiatric/Behavioral: Negative for agitation, behavioral problems, confusion, hallucinations, sleep disturbance and suicidal ideas  The patient is not nervous/anxious         Past Medical History:     Past Medical History:   Diagnosis Date    Hypothyroid       Past Surgical History:     Past Surgical History:   Procedure Laterality Date     SECTION      x 3    DILATION AND CURETTAGE OF UTERUS        Social History:     Social History     Social History    Marital status: /Civil Union     Spouse name: N/A    Number of children: 3    Years of education: N/A     Social History Main Topics    Smoking status: Never Smoker    Smokeless tobacco: Never Used    Alcohol use No    Drug use: No    Sexual activity: Yes     Birth control/ protection: Pill     Other Topics Concern    None     Social History Narrative    Always uses seat belt    Caffeine use    No Orthodoxy beliefs      Family History:     Family History   Problem Relation Age of Onset    Skin cancer Mother     Diabetes Family     No Known Problems Father       Current Medications:     Current Outpatient Prescriptions   Medication Sig Dispense Refill    acetaminophen (TYLENOL) 500 mg tablet Take 1,000 mg by mouth every 6 (six) hours as needed for mild pain      levonorgestrel-ethinyl estradiol (SEASONALE) 0 15-0 03 MG per tablet Take 1 tablet by mouth daily 91 tablet 3    levothyroxine 50 mcg tablet Take 1 tablet (50 mcg total) by mouth daily 30 tablet 3    Multiple Vitamins-Minerals (MULTIVITAMIN ADULT PO) Take 1 tablet by mouth daily       No current facility-administered medications for this visit  Allergies:     No Known Allergies   Objective:     /84   Pulse 79   Ht 5' 3" (1 6 m)   Wt 94 3 kg (208 lb)   SpO2 98%   BMI 36 85 kg/m²     Physical Exam   Constitutional: She is oriented to person, place, and time  She appears well-developed and well-nourished  No distress  HENT:   Head: Normocephalic and atraumatic  Right Ear: External ear normal    Left Ear: External ear normal    Nose: Nose normal    Mouth/Throat: Oropharynx is clear and moist    Eyes: Pupils are equal, round, and reactive to light  Conjunctivae and EOM are normal  Right eye exhibits no discharge  Left eye exhibits no discharge  Neck: Normal range of motion  Neck supple  Cardiovascular: Normal rate, regular rhythm, normal heart sounds and intact distal pulses  Exam reveals no gallop and no friction rub  No murmur heard    Pulmonary/Chest: Effort normal and breath sounds normal  No respiratory distress  She has no wheezes  She has no rales  She exhibits no tenderness  Abdominal: Soft  Bowel sounds are normal  She exhibits no distension and no mass  There is no tenderness  There is no rebound and no guarding  No hernia  Musculoskeletal: Normal range of motion  She exhibits no edema, tenderness or deformity  Neurological: She is alert and oriented to person, place, and time  She displays normal reflexes  No cranial nerve deficit or sensory deficit  She exhibits normal muscle tone  Coordination normal    Skin: Skin is warm and dry  She is not diaphoretic  Psychiatric: She has a normal mood and affect  Her behavior is normal  Judgment and thought content normal         Health Maintenance:     Health Maintenance   Topic Date Due    DTaP,Tdap,and Td Vaccines (1 - Tdap) 2009    INFLUENZA VACCINE  2018    Depression Screening PHQ  2019     There is no immunization history for the selected administration types on file for this patient  RADHA Hoffmann  [facility]: Body mass index is 36 85 kg/m²  Discussed the patient's BMI with her  The BMI is above average  BMI counseling and education was provided to the patient  Nutrition recommendations include reducing portion sizes, decreasing overall calorie intake, 3-5 servings of fruits/vegetables daily, reducing fast food intake, consuming healthier snacks, decreasing soda and/or juice intake, moderation in carbohydrate intake, increasing intake of lean protein, reducing intake of saturated fat and trans fat and reducing intake of cholesterol  Exercise recommendations include exercising 3-5 times per week  ADULT ANNUAL PHYSICAL  [facility] - [address]    NAME: Ani Harper  AGE: 25 y o   SEX: female  : 1990     DATE: 2019     Assessment and Plan:     Problem List Items Addressed This Visit     Hypothyroidism - Primary    Relevant Medications    levothyroxine 50 mcg tablet    Other Relevant Orders    TSH, 3rd generation with Free T4 reflex      Other Visit Diagnoses     Encounter for well adult exam without abnormal findings        Relevant Orders    Lipid panel    Comprehensive metabolic panel    Annual physical exam              Health maintenance and preventative care screenings were discussed with patient today  Appropriate education was printed on patient's after visit summary  · Discussed risks/benefits of screening for cervical cancer  Patient is up-to-date with their preventive screenings  · Immunizations were reviewed: patient is up-to-date with her immunizations  Counseling:  · Dental Health: discussed importance of regular tooth brushing, flossing, and dental visits  No Follow-up on file  Chief Complaint:     Chief Complaint   Patient presents with    Physical Exam     med check       History of Present Illness:     Adult Annual Physical   Patient here for a comprehensive physical exam  The patient reports no problems  Diet and Physical Activity  · Diet/Nutrition: well balanced diet, limited junk food and consuming 3-5 servings of fruits/vegetables daily  · Weight concerns: patient has class 2 obesity (BMI 35 0-39 9)  · Exercise: 5-7 times a week on average  Depression Screening  PHQ-9 Depression Screening    PHQ-9:    Frequency of the following problems over the past two weeks:       Little interest or pleasure in doing things:  0 - not at all  Feeling down, depressed, or hopeless:  0 - not at all  PHQ-2 Score:  0       General Health  · Sleep: gets 4-6 hours of sleep on average  · Hearing: normal - bilateral   · Vision: no vision problems  · Dental: regular dental visits  /GYN Health  · Last menstrual period: 1/31/2019  · Contraceptive method: oral contraceptives    · History of STDs?: no      Review of Systems:     Review of Systems   Past Medical History:     Past Medical History:   Diagnosis Date    Hypothyroid       Past Surgical History:     Past Surgical History:   Procedure Laterality Date     SECTION      x 3    DILATION AND CURETTAGE OF UTERUS        Social History:     Social History     Social History    Marital status: /Civil Union     Spouse name: N/A    Number of children: 3    Years of education: N/A     Social History Main Topics    Smoking status: Never Smoker    Smokeless tobacco: Never Used    Alcohol use No    Drug use: No    Sexual activity: Yes     Birth control/ protection: Pill     Other Topics Concern    None     Social History Narrative    Always uses seat belt    Caffeine use    No Restorationism beliefs      Family History:     Family History   Problem Relation Age of Onset    Skin cancer Mother     Diabetes Family     No Known Problems Father       Current Medications:     Current Outpatient Prescriptions   Medication Sig Dispense Refill    acetaminophen (TYLENOL) 500 mg tablet Take 1,000 mg by mouth every 6 (six) hours as needed for mild pain      levonorgestrel-ethinyl estradiol (SEASONALE) 0 15-0 03 MG per tablet Take 1 tablet by mouth daily 91 tablet 3    levothyroxine 50 mcg tablet Take 1 tablet (50 mcg total) by mouth daily 30 tablet 3    Multiple Vitamins-Minerals (MULTIVITAMIN ADULT PO) Take 1 tablet by mouth daily       No current facility-administered medications for this visit  Allergies:     No Known Allergies   Objective:     /84   Pulse 79   Ht 5' 3" (1 6 m)   Wt 94 3 kg (208 lb)   SpO2 98%   BMI 36 85 kg/m²     Physical Exam     Health Maintenance:     Health Maintenance   Topic Date Due    DTaP,Tdap,and Td Vaccines (1 - Tdap) 2009    INFLUENZA VACCINE  2018    Depression Screening PHQ  2019     There is no immunization history for the selected administration types on file for this patient      Madelyn Young, [address] [place] [address]

## 2019-02-05 NOTE — PATIENT INSTRUCTIONS
Labs as ordered  Will call with results  Continue Levothyroxine as ordered  Call/return with any problems or concerns  Obesity   AMBULATORY CARE:   Obesity  is when your body mass index (BMI) is greater than 30  Your healthcare provider will use your height and weight to measure your BMI  The risks of obesity include  many health problems, such as injuries or physical disability  You may need tests to check for the following:  · Diabetes     · High blood pressure or high cholesterol     · Heart disease     · Gallbladder or liver disease     · Cancer of the colon, breast, prostate, liver, or kidney     · Sleep apnea     · Arthritis or gout  Seek care immediately if:   · You have a severe headache, confusion, or difficulty speaking  · You have weakness on one side of your body  · You have chest pain, sweating, or shortness of breath  Contact your healthcare provider if:   · You have symptoms of gallbladder or liver disease, such as pain in your upper abdomen  · You have knee or hip pain and discomfort while walking  · You have symptoms of diabetes, such as intense hunger and thirst, and frequent urination  · You have symptoms of sleep apnea, such as snoring or daytime sleepiness  · You have questions or concerns about your condition or care  Treatment for obesity  focuses on helping you lose weight to improve your health  Even a small decrease in BMI can reduce the risk for many health problems  Your healthcare provider will help you set a weight-loss goal   · Lifestyle changes  are the first step in treating obesity  These include making healthy food choices and getting regular physical activity  Your healthcare provider may suggest a weight-loss program that involves coaching, education, and therapy  · Medicine  may help you lose weight when it is used with a healthy diet and physical activity  · Surgery  can help you lose weight if you are very obese and have other health problems  There are several types of weight-loss surgery  Ask your healthcare provider for more information  Be successful losing weight:   · Set small, realistic goals  An example of a small goal is to walk for 20 minutes 5 days a week  Darrius goal is to lose 5% of your body weight  · Tell friends, family members, and coworkers about your goals  and ask for their support  Ask a friend to lose weight with you, or join a weight-loss support group  · Identify foods or triggers that may cause you to overeat , and find ways to avoid them  Remove tempting high-calorie foods from your home and workplace  Place a bowl of fresh fruit on your kitchen counter  If stress causes you to eat, then find other ways to cope with stress  · Keep a diary to track what you eat and drink  Also write down how many minutes of physical activity you do each day  Weigh yourself once a week and record it in your diary  Eating changes: You will need to eat 500 to 1,000 fewer calories each day than you currently eat to lose 1 to 2 pounds a week  The following changes will help you cut calories:  · Eat smaller portions  Use small plates, no larger than 9 inches in diameter  Fill your plate half full of fruits and vegetables  Measure your food using measuring cups until you know what a serving size looks like  · Eat 3 meals and 1 or 2 snacks each day  Plan your meals in advance  Sandra Abbott and eat at home most of the time  Eat slowly  · Eat fruits and vegetables at every meal   They are low in calories and high in fiber, which makes you feel full  Do not add butter, margarine, or cream sauce to vegetables  Use herbs to season steamed vegetables  · Eat less fat and fewer fried foods  Eat more baked or grilled chicken and fish  These protein sources are lower in calories and fat than red meat  Limit fast food  Dress your salads with olive oil and vinegar instead of bottled dressing  · Limit the amount of sugar you eat    Do not drink sugary beverages  Limit alcohol  Activity changes:  Physical activity is good for your body in many ways  It helps you burn calories and build strong muscles  It decreases stress and depression, and improves your mood  It can also help you sleep better  Talk to your healthcare provider before you begin an exercise program   · Exercise for at least 30 minutes 5 days a week  Start slowly  Set aside time each day for physical activity that you enjoy and that is convenient for you  It is best to do both weight training and an activity that increases your heart rate, such as walking, bicycling, or swimming  · Find ways to be more active  Do yard work and housecleaning  Walk up the stairs instead of using elevators  Spend your leisure time going to events that require walking, such as outdoor festivals or fairs  This extra physical activity can help you lose weight and keep it off  Follow up with your healthcare provider as directed: You may need to meet with a dietitian  Write down your questions so you remember to ask them during your visits  © 2017 [facility] [facility] Information is for End User's use only and may not be sold, redistributed or otherwise used for commercial purposes  All illustrations and images included in CareNotes® are the copyrighted property of [employer]  or Rudy Martinez  The above information is an  only  It is not intended as medical advice for individual conditions or treatments  Talk to your doctor, nurse or pharmacist before following any medical regimen to see if it is safe and effective for you  Wellness Visit for Adults   AMBULATORY CARE:   A wellness visit  is when you see your healthcare provider to get screened for health problems  You can also get advice on how to stay healthy  Write down your questions so you remember to ask them  Ask your healthcare provider how often you should have a wellness visit     What happens at a wellness visit:  Your healthcare provider will ask about your health, and your family history of health problems  This includes high blood pressure, heart disease, and cancer  He or she will ask if you have symptoms that concern you, if you smoke, and about your mood  You may also be asked about your intake of medicines, supplements, food, and alcohol  Any of the following may be done:  · Your weight  will be checked  Your height may also be checked so your body mass index (BMI) can be calculated  Your BMI shows if you are at a healthy weight  · Your blood pressure  and heart rate will be checked  Your temperature may also be checked  · Blood and urine tests  may be done  Blood tests may be done to check your cholesterol levels  Abnormal cholesterol levels increase your risk for heart disease and stroke  You may also need a blood or urine test to check for diabetes if you are at increased risk  Urine tests may be done to look for signs of an infection or kidney disease  · A physical exam  includes checking your heartbeat and lungs with a stethoscope  Your healthcare provider may also check your skin to look for sun damage  · Screening tests  may be recommended  A screening test is done to check for diseases that may not cause symptoms  The screening tests you may need depend on your age, gender, family history, and lifestyle habits  For example, colorectal screening may be recommended if you are 48years old or older  Screening tests you need if you are a woman:   · A Pap smear  is used to screen for cervical cancer  Pap smears are usually done every 3 to 5 years depending on your age  You may need them more often if you have had abnormal Pap smear test results in the past  Ask your healthcare provider how often you should have a Pap smear  · A mammogram  is an x-ray of your breasts to screen for breast cancer  Experts recommend mammograms every 2 years starting at age 48 years   You may need a mammogram at age 52 years or younger if you have an increased risk for breast cancer  Talk to your healthcare provider about when you should start having mammograms and how often you need them  Vaccines you may need:   · Get an influenza vaccine  every year  The influenza vaccine protects you from the flu  Several types of viruses cause the flu  The viruses change over time, so new vaccines are made each year  · Get a tetanus-diphtheria (Td) booster vaccine  every 10 years  This vaccine protects you against tetanus and diphtheria  Tetanus is a severe infection that may cause painful muscle spasms and lockjaw  Diphtheria is a severe bacterial infection that causes a thick covering in the back of your mouth and throat  · Get a human papillomavirus (HPV) vaccine  if you are female and aged 23 to 32 or male 23 to 24 and never received it  This vaccine protects you from HPV infection  HPV is the most common infection spread by sexual contact  HPV may also cause vaginal, penile, and anal cancers  · Get a pneumococcal vaccine  if you are aged 72 years or older  The pneumococcal vaccine is an injection given to protect you from pneumococcal disease  Pneumococcal disease is an infection caused by pneumococcal bacteria  The infection may cause pneumonia, meningitis, or an ear infection  · Get a shingles vaccine  if you are aged 61 or older, even if you have had shingles before  The shingles vaccine is an injection to protect you from the varicella-zoster virus  This is the same virus that causes chickenpox  Shingles is a painful rash that develops in people who had chickenpox or have been exposed to the virus  How to eat healthy:  My Plate is a model for planning healthy meals  It shows the types and amounts of foods that should go on your plate  Fruits and vegetables make up about half of your plate, and grains and protein make up the other half  A serving of dairy is included on the side of your plate   The amount of calories and serving sizes you need depends on your age, gender, weight, and height  Examples of healthy foods are listed below:  · Eat a variety of vegetables  such as dark green, red, and orange vegetables  You can also include canned vegetables low in sodium (salt) and frozen vegetables without added butter or sauces  · Eat a variety of fresh fruits , canned fruit in 100% juice, frozen fruit, and dried fruit  · Include whole grains  At least half of the grains you eat should be whole grains  Examples include whole-wheat bread, wheat pasta, brown rice, and whole-grain cereals such as oatmeal     · Eat a variety of protein foods such as seafood (fish and shellfish), lean meat, and poultry without skin (turkey and chicken)  Examples of lean meats include pork leg, shoulder, or tenderloin, and beef round, sirloin, tenderloin, and extra lean ground beef  Other protein foods include eggs and egg substitutes, beans, peas, soy products, nuts, and seeds  · Choose low-fat dairy products such as skim or 1% milk or low-fat yogurt, cheese, and cottage cheese  · Limit unhealthy fats  such as butter, hard margarine, and shortening  Exercise:  Exercise at least 30 minutes per day on most days of the week  Some examples of exercise include walking, biking, dancing, and swimming  You can also fit in more physical activity by taking the stairs instead of the elevator or parking farther away from stores  Include muscle strengthening activities 2 days each week  Regular exercise provides many health benefits  It helps you manage your weight, and decreases your risk for type 2 diabetes, heart disease, stroke, and high blood pressure  Exercise can also help improve your mood  Ask your healthcare provider about the best exercise plan for you  General health and safety guidelines:   · Do not smoke  Nicotine and other chemicals in cigarettes and cigars can cause lung damage   Ask your healthcare provider for information if you currently smoke and need help to quit  E-cigarettes or smokeless tobacco still contain nicotine  Talk to your healthcare provider before you use these products  · Limit alcohol  A drink of alcohol is 12 ounces of beer, 5 ounces of wine, or 1½ ounces of liquor  · Lose weight, if needed  Being overweight increases your risk of certain health conditions  These include heart disease, high blood pressure, type 2 diabetes, and certain types of cancer  · Protect your skin  Do not sunbathe or use tanning beds  Use sunscreen with a SPF 15 or higher  Apply sunscreen at least 15 minutes before you go outside  Reapply sunscreen every 2 hours  Wear protective clothing, hats, and sunglasses when you are outside  · Drive safely  Always wear your seatbelt  Make sure everyone in your car wears a seatbelt  A seatbelt can save your life if you are in an accident  Do not use your cell phone when you are driving  This could distract you and cause an accident  Pull over if you need to make a call or send a text message  · Practice safe sex  Use latex condoms if are sexually active and have more than one partner  Your healthcare provider may recommend screening tests for sexually transmitted infections (STIs)  · Wear helmets, lifejackets, and protective gear  Always wear a helmet when you ride a bike or motorcycle, go skiing, or play sports that could cause a head injury  Wear protective equipment when you play sports  Wear a lifejacket when you are on a boat or doing water sports  © 2017 [address] [facility] Information is for End User's use only and may not be sold, redistributed or otherwise used for commercial purposes  All illustrations and images included in CareNotes® are the copyrighted property of [employer]  or Rudy Martinez  The above information is an  only  It is not intended as medical advice for individual conditions or treatments   Talk to your doctor, nurse or pharmacist before following any medical regimen to see if it is safe and effective for you  Obesity   AMBULATORY CARE:   Obesity  is when your body mass index (BMI) is greater than 30  Your healthcare provider will use your height and weight to measure your BMI  The risks of obesity include  many health problems, such as injuries or physical disability  You may need tests to check for the following:  · Diabetes     · High blood pressure or high cholesterol     · Heart disease     · Gallbladder or liver disease     · Cancer of the colon, breast, prostate, liver, or kidney     · Sleep apnea     · Arthritis or gout  Seek care immediately if:   · You have a severe headache, confusion, or difficulty speaking  · You have weakness on one side of your body  · You have chest pain, sweating, or shortness of breath  Contact your healthcare provider if:   · You have symptoms of gallbladder or liver disease, such as pain in your upper abdomen  · You have knee or hip pain and discomfort while walking  · You have symptoms of diabetes, such as intense hunger and thirst, and frequent urination  · You have symptoms of sleep apnea, such as snoring or daytime sleepiness  · You have questions or concerns about your condition or care  Treatment for obesity  focuses on helping you lose weight to improve your health  Even a small decrease in BMI can reduce the risk for many health problems  Your healthcare provider will help you set a weight-loss goal   · Lifestyle changes  are the first step in treating obesity  These include making healthy food choices and getting regular physical activity  Your healthcare provider may suggest a weight-loss program that involves coaching, education, and therapy  · Medicine  may help you lose weight when it is used with a healthy diet and physical activity       · Surgery  can help you lose weight if you are very obese and have other health problems  There are several types of weight-loss surgery  Ask your healthcare provider for more information  Be successful losing weight:   · Set small, realistic goals  An example of a small goal is to walk for 20 minutes 5 days a week  Anther goal is to lose 5% of your body weight  · Tell friends, family members, and coworkers about your goals  and ask for their support  Ask a friend to lose weight with you, or join a weight-loss support group  · Identify foods or triggers that may cause you to overeat , and find ways to avoid them  Remove tempting high-calorie foods from your home and workplace  Place a bowl of fresh fruit on your kitchen counter  If stress causes you to eat, then find other ways to cope with stress  · Keep a diary to track what you eat and drink  Also write down how many minutes of physical activity you do each day  Weigh yourself once a week and record it in your diary  Eating changes: You will need to eat 500 to 1,000 fewer calories each day than you currently eat to lose 1 to 2 pounds a week  The following changes will help you cut calories:  · Eat smaller portions  Use small plates, no larger than 9 inches in diameter  Fill your plate half full of fruits and vegetables  Measure your food using measuring cups until you know what a serving size looks like  · Eat 3 meals and 1 or 2 snacks each day  Plan your meals in advance  Susie Solorzano and eat at home most of the time  Eat slowly  · Eat fruits and vegetables at every meal   They are low in calories and high in fiber, which makes you feel full  Do not add butter, margarine, or cream sauce to vegetables  Use herbs to season steamed vegetables  · Eat less fat and fewer fried foods  Eat more baked or grilled chicken and fish  These protein sources are lower in calories and fat than red meat  Limit fast food  Dress your salads with olive oil and vinegar instead of bottled dressing  · Limit the amount of sugar you eat  Do not drink sugary beverages  Limit alcohol  Activity changes:  Physical activity is good for your body in many ways  It helps you burn calories and build strong muscles  It decreases stress and depression, and improves your mood  It can also help you sleep better  Talk to your healthcare provider before you begin an exercise program   · Exercise for at least 30 minutes 5 days a week  Start slowly  Set aside time each day for physical activity that you enjoy and that is convenient for you  It is best to do both weight training and an activity that increases your heart rate, such as walking, bicycling, or swimming  · Find ways to be more active  Do yard work and housecleaning  Walk up the stairs instead of using elevators  Spend your leisure time going to events that require walking, such as outdoor festivals or fairs  This extra physical activity can help you lose weight and keep it off  Follow up with your healthcare provider as directed: You may need to meet with a dietitian  Write down your questions so you remember to ask them during your visits  © 2017 [address] [facility] Information is for End User's use only and may not be sold, redistributed or otherwise used for commercial purposes  All illustrations and images included in CareNotes® are the copyrighted property of [employer]  or Rudy Martinez  The above information is an  only  It is not intended as medical advice for individual conditions or treatments  Talk to your doctor, nurse or pharmacist before following any medical regimen to see if it is safe and effective for you  Wellness Visit for Adults   AMBULATORY CARE:   A wellness visit  is when you see your healthcare provider to get screened for health problems  You can also get advice on how to stay healthy  Write down your questions so you remember to ask them  Ask your healthcare provider how often you should have a wellness visit     What happens at a wellness visit:  Your healthcare provider will ask about your health, and your family history of health problems  This includes high blood pressure, heart disease, and cancer  He or she will ask if you have symptoms that concern you, if you smoke, and about your mood  You may also be asked about your intake of medicines, supplements, food, and alcohol  Any of the following may be done:  · Your weight  will be checked  Your height may also be checked so your body mass index (BMI) can be calculated  Your BMI shows if you are at a healthy weight  · Your blood pressure  and heart rate will be checked  Your temperature may also be checked  · Blood and urine tests  may be done  Blood tests may be done to check your cholesterol levels  Abnormal cholesterol levels increase your risk for heart disease and stroke  You may also need a blood or urine test to check for diabetes if you are at increased risk  Urine tests may be done to look for signs of an infection or kidney disease  · A physical exam  includes checking your heartbeat and lungs with a stethoscope  Your healthcare provider may also check your skin to look for sun damage  · Screening tests  may be recommended  A screening test is done to check for diseases that may not cause symptoms  The screening tests you may need depend on your age, gender, family history, and lifestyle habits  For example, colorectal screening may be recommended if you are 48years old or older  Screening tests you need if you are a woman:   · A Pap smear  is used to screen for cervical cancer  Pap smears are usually done every 3 to 5 years depending on your age  You may need them more often if you have had abnormal Pap smear test results in the past  Ask your healthcare provider how often you should have a Pap smear  · A mammogram  is an x-ray of your breasts to screen for breast cancer  Experts recommend mammograms every 2 years starting at age 48 years   You may need a mammogram at age 52 years or younger if you have an increased risk for breast cancer  Talk to your healthcare provider about when you should start having mammograms and how often you need them  Vaccines you may need:   · Get an influenza vaccine  every year  The influenza vaccine protects you from the flu  Several types of viruses cause the flu  The viruses change over time, so new vaccines are made each year  · Get a tetanus-diphtheria (Td) booster vaccine  every 10 years  This vaccine protects you against tetanus and diphtheria  Tetanus is a severe infection that may cause painful muscle spasms and lockjaw  Diphtheria is a severe bacterial infection that causes a thick covering in the back of your mouth and throat  · Get a human papillomavirus (HPV) vaccine  if you are female and aged 23 to 32 or male 23 to 24 and never received it  This vaccine protects you from HPV infection  HPV is the most common infection spread by sexual contact  HPV may also cause vaginal, penile, and anal cancers  · Get a pneumococcal vaccine  if you are aged 72 years or older  The pneumococcal vaccine is an injection given to protect you from pneumococcal disease  Pneumococcal disease is an infection caused by pneumococcal bacteria  The infection may cause pneumonia, meningitis, or an ear infection  · Get a shingles vaccine  if you are aged 61 or older, even if you have had shingles before  The shingles vaccine is an injection to protect you from the varicella-zoster virus  This is the same virus that causes chickenpox  Shingles is a painful rash that develops in people who had chickenpox or have been exposed to the virus  How to eat healthy:  My Plate is a model for planning healthy meals  It shows the types and amounts of foods that should go on your plate  Fruits and vegetables make up about half of your plate, and grains and protein make up the other half   A serving of dairy is included on the side of your plate  The amount of calories and serving sizes you need depends on your age, gender, weight, and height  Examples of healthy foods are listed below:  · Eat a variety of vegetables  such as dark green, red, and orange vegetables  You can also include canned vegetables low in sodium (salt) and frozen vegetables without added butter or sauces  · Eat a variety of fresh fruits , canned fruit in 100% juice, frozen fruit, and dried fruit  · Include whole grains  At least half of the grains you eat should be whole grains  Examples include whole-wheat bread, wheat pasta, brown rice, and whole-grain cereals such as oatmeal     · Eat a variety of protein foods such as seafood (fish and shellfish), lean meat, and poultry without skin (turkey and chicken)  Examples of lean meats include pork leg, shoulder, or tenderloin, and beef round, sirloin, tenderloin, and extra lean ground beef  Other protein foods include eggs and egg substitutes, beans, peas, soy products, nuts, and seeds  · Choose low-fat dairy products such as skim or 1% milk or low-fat yogurt, cheese, and cottage cheese  · Limit unhealthy fats  such as butter, hard margarine, and shortening  Exercise:  Exercise at least 30 minutes per day on most days of the week  Some examples of exercise include walking, biking, dancing, and swimming  You can also fit in more physical activity by taking the stairs instead of the elevator or parking farther away from stores  Include muscle strengthening activities 2 days each week  Regular exercise provides many health benefits  It helps you manage your weight, and decreases your risk for type 2 diabetes, heart disease, stroke, and high blood pressure  Exercise can also help improve your mood  Ask your healthcare provider about the best exercise plan for you  General health and safety guidelines:   · Do not smoke  Nicotine and other chemicals in cigarettes and cigars can cause lung damage   Ask your healthcare provider for information if you currently smoke and need help to quit  E-cigarettes or smokeless tobacco still contain nicotine  Talk to your healthcare provider before you use these products  · Limit alcohol  A drink of alcohol is 12 ounces of beer, 5 ounces of wine, or 1½ ounces of liquor  · Lose weight, if needed  Being overweight increases your risk of certain health conditions  These include heart disease, high blood pressure, type 2 diabetes, and certain types of cancer  · Protect your skin  Do not sunbathe or use tanning beds  Use sunscreen with a SPF 15 or higher  Apply sunscreen at least 15 minutes before you go outside  Reapply sunscreen every 2 hours  Wear protective clothing, hats, and sunglasses when you are outside  · Drive safely  Always wear your seatbelt  Make sure everyone in your car wears a seatbelt  A seatbelt can save your life if you are in an accident  Do not use your cell phone when you are driving  This could distract you and cause an accident  Pull over if you need to make a call or send a text message  · Practice safe sex  Use latex condoms if are sexually active and have more than one partner  Your healthcare provider may recommend screening tests for sexually transmitted infections (STIs)  · Wear helmets, lifejackets, and protective gear  Always wear a helmet when you ride a bike or motorcycle, go skiing, or play sports that could cause a head injury  Wear protective equipment when you play sports  Wear a lifejacket when you are on a boat or doing water sports  © 2017 [employer]  Information is for End User's use only and may not be sold, redistributed or otherwise used for commercial purposes  All illustrations and images included in CareNotes® are the copyrighted property of [email or web address] , [employer]  or Rudy Martinez  The above information is an  only   It is not intended as medical advice for individual conditions or treatments  Talk to your doctor, nurse or pharmacist before following any medical regimen to see if it is safe and effective for you

## 2019-05-13 ENCOUNTER — HOSPITAL ENCOUNTER (EMERGENCY)
Facility: HOSPITAL | Age: 29
Discharge: HOME | End: 2019-05-13
Attending: EMERGENCY MEDICINE
Payer: COMMERCIAL

## 2019-05-13 ENCOUNTER — APPOINTMENT (EMERGENCY)
Dept: RADIOLOGY | Facility: HOSPITAL | Age: 29
End: 2019-05-13
Payer: COMMERCIAL

## 2019-05-13 VITALS
OXYGEN SATURATION: 98 % | SYSTOLIC BLOOD PRESSURE: 132 MMHG | RESPIRATION RATE: 20 BRPM | HEART RATE: 88 BPM | TEMPERATURE: 98.8 F | WEIGHT: 210 LBS | DIASTOLIC BLOOD PRESSURE: 67 MMHG | BODY MASS INDEX: 37.21 KG/M2 | HEIGHT: 63 IN

## 2019-05-13 DIAGNOSIS — J06.9 URI (UPPER RESPIRATORY INFECTION): Primary | ICD-10-CM

## 2019-05-13 PROCEDURE — 99283 EMERGENCY DEPT VISIT LOW MDM: CPT | Performed by: PHYSICIAN ASSISTANT

## 2019-05-13 PROCEDURE — 99284 EMERGENCY DEPT VISIT MOD MDM: CPT

## 2019-05-13 PROCEDURE — 71046 X-RAY EXAM CHEST 2 VIEWS: CPT

## 2019-05-13 RX ORDER — DEXTROMETHORPHAN HYDROBROMIDE AND PROMETHAZINE HYDROCHLORIDE 15; 6.25 MG/5ML; MG/5ML
5 SYRUP ORAL 4 TIMES DAILY PRN
Qty: 180 ML | Refills: 0 | Status: SHIPPED | OUTPATIENT
Start: 2019-05-13 | End: 2020-08-22

## 2019-05-16 ENCOUNTER — VBI (OUTPATIENT)
Dept: ADMINISTRATIVE | Facility: OTHER | Age: 29
End: 2019-05-16

## 2019-07-18 DIAGNOSIS — E03.9 HYPOTHYROIDISM, UNSPECIFIED TYPE: ICD-10-CM

## 2019-07-19 RX ORDER — LEVOTHYROXINE SODIUM 0.05 MG/1
50 TABLET ORAL DAILY
Qty: 30 TABLET | Refills: 0 | Status: SHIPPED | OUTPATIENT
Start: 2019-07-19 | End: 2019-08-24 | Stop reason: SDUPTHER

## 2019-08-07 ENCOUNTER — TELEPHONE (OUTPATIENT)
Dept: FAMILY MEDICINE CLINIC | Facility: CLINIC | Age: 29
End: 2019-08-07

## 2019-08-07 LAB
ALBUMIN SERPL-MCNC: 4.1 G/DL (ref 3.5–5.5)
ALBUMIN/GLOB SERPL: 1.4 {RATIO} (ref 1.2–2.2)
ALP SERPL-CCNC: 78 IU/L (ref 39–117)
ALT SERPL-CCNC: 28 IU/L (ref 0–32)
AST SERPL-CCNC: 19 IU/L (ref 0–40)
BILIRUB SERPL-MCNC: <0.2 MG/DL (ref 0–1.2)
BUN SERPL-MCNC: 11 MG/DL (ref 6–20)
BUN/CREAT SERPL: 14 (ref 9–23)
CALCIUM SERPL-MCNC: 9.6 MG/DL (ref 8.7–10.2)
CHLORIDE SERPL-SCNC: 103 MMOL/L (ref 96–106)
CHOLEST SERPL-MCNC: 178 MG/DL (ref 100–199)
CHOLEST/HDLC SERPL: 3.4 RATIO (ref 0–4.4)
CO2 SERPL-SCNC: 22 MMOL/L (ref 20–29)
CREAT SERPL-MCNC: 0.81 MG/DL (ref 0.57–1)
GLOBULIN SER-MCNC: 3 G/DL (ref 1.5–4.5)
GLUCOSE SERPL-MCNC: 81 MG/DL (ref 65–99)
HDLC SERPL-MCNC: 53 MG/DL
LDLC SERPL CALC-MCNC: 100 MG/DL (ref 0–99)
POTASSIUM SERPL-SCNC: 4.6 MMOL/L (ref 3.5–5.2)
PROT SERPL-MCNC: 7.1 G/DL (ref 6–8.5)
SL AMB EGFR AFRICAN AMERICAN: 113 ML/MIN/1.73
SL AMB EGFR NON AFRICAN AMERICAN: 98 ML/MIN/1.73
SL AMB VLDL CHOLESTEROL CALC: 25 MG/DL (ref 5–40)
SODIUM SERPL-SCNC: 139 MMOL/L (ref 134–144)
TRIGL SERPL-MCNC: 123 MG/DL (ref 0–149)
TSH SERPL DL<=0.005 MIU/L-ACNC: 2.96 UIU/ML (ref 0.45–4.5)

## 2019-08-07 NOTE — TELEPHONE ENCOUNTER
Patient notified, Lipid #'s reviewed  ----- Message from [address] [place] [address],[address] sent at 8/7/2019  2:28 PM EDT -----  Call with results  LDL is just slightly elevated  Diet modifications   Other labs normal

## 2019-08-24 DIAGNOSIS — E03.9 HYPOTHYROIDISM, UNSPECIFIED TYPE: ICD-10-CM

## 2019-08-26 RX ORDER — LEVOTHYROXINE SODIUM 0.05 MG/1
50 TABLET ORAL DAILY
Qty: 90 TABLET | Refills: 0 | Status: SHIPPED | OUTPATIENT
Start: 2019-08-26 | End: 2019-12-11 | Stop reason: SDUPTHER

## 2019-12-11 DIAGNOSIS — E03.9 HYPOTHYROIDISM, UNSPECIFIED TYPE: ICD-10-CM

## 2019-12-13 RX ORDER — LEVOTHYROXINE SODIUM 0.05 MG/1
50 TABLET ORAL DAILY
Qty: 90 TABLET | Refills: 0 | Status: SHIPPED | OUTPATIENT
Start: 2019-12-13 | End: 2020-03-30 | Stop reason: SDUPTHER

## 2020-03-30 DIAGNOSIS — E03.9 HYPOTHYROIDISM, UNSPECIFIED TYPE: ICD-10-CM

## 2020-03-30 RX ORDER — LEVOTHYROXINE SODIUM 0.05 MG/1
50 TABLET ORAL DAILY
Qty: 90 TABLET | Refills: 0 | Status: SHIPPED | OUTPATIENT
Start: 2020-03-30 | End: 2020-07-16 | Stop reason: SDUPTHER

## 2020-07-16 DIAGNOSIS — E03.9 HYPOTHYROIDISM, UNSPECIFIED TYPE: ICD-10-CM

## 2020-07-21 DIAGNOSIS — Z13.6 SCREENING FOR CARDIOVASCULAR CONDITION: ICD-10-CM

## 2020-07-21 DIAGNOSIS — E03.9 HYPOTHYROIDISM, UNSPECIFIED TYPE: Primary | ICD-10-CM

## 2020-07-21 RX ORDER — LEVOTHYROXINE SODIUM 0.05 MG/1
50 TABLET ORAL DAILY
Qty: 30 TABLET | Refills: 0 | Status: SHIPPED | OUTPATIENT
Start: 2020-07-21 | End: 2020-08-22 | Stop reason: SDUPTHER

## 2020-08-10 ENCOUNTER — OFFICE VISIT (OUTPATIENT)
Dept: FAMILY MEDICINE CLINIC | Facility: CLINIC | Age: 30
End: 2020-08-10
Payer: COMMERCIAL

## 2020-08-10 VITALS
TEMPERATURE: 98.6 F | HEIGHT: 62 IN | WEIGHT: 219 LBS | OXYGEN SATURATION: 98 % | SYSTOLIC BLOOD PRESSURE: 122 MMHG | HEART RATE: 86 BPM | DIASTOLIC BLOOD PRESSURE: 80 MMHG | BODY MASS INDEX: 40.3 KG/M2

## 2020-08-10 DIAGNOSIS — Z00.00 ANNUAL PHYSICAL EXAM: Primary | ICD-10-CM

## 2020-08-10 PROCEDURE — 3008F BODY MASS INDEX DOCD: CPT | Performed by: NURSE PRACTITIONER

## 2020-08-10 PROCEDURE — 3725F SCREEN DEPRESSION PERFORMED: CPT | Performed by: NURSE PRACTITIONER

## 2020-08-10 PROCEDURE — 99395 PREV VISIT EST AGE 18-39: CPT | Performed by: NURSE PRACTITIONER

## 2020-08-10 PROCEDURE — 1036F TOBACCO NON-USER: CPT | Performed by: NURSE PRACTITIONER

## 2020-08-10 NOTE — PATIENT INSTRUCTIONS
Labs as previously ordered  Call with any problems or concerns      Wellness Visit for Adults   AMBULATORY CARE:   A wellness visit  is when you see your healthcare provider to get screened for health problems  You can also get advice on how to stay healthy  Write down your questions so you remember to ask them  Ask your healthcare provider how often you should have a wellness visit  What happens at a wellness visit:  Your healthcare provider will ask about your health, and your family history of health problems  This includes high blood pressure, heart disease, and cancer  He or she will ask if you have symptoms that concern you, if you smoke, and about your mood  You may also be asked about your intake of medicines, supplements, food, and alcohol  Any of the following may be done:  · Your weight  will be checked  Your height may also be checked so your body mass index (BMI) can be calculated  Your BMI shows if you are at a healthy weight  · Your blood pressure  and heart rate will be checked  Your temperature may also be checked  · Blood and urine tests  may be done  Blood tests may be done to check your cholesterol levels  Abnormal cholesterol levels increase your risk for heart disease and stroke  You may also need a blood or urine test to check for diabetes if you are at increased risk  Urine tests may be done to look for signs of an infection or kidney disease  · A physical exam  includes checking your heartbeat and lungs with a stethoscope  Your healthcare provider may also check your skin to look for sun damage  · Screening tests  may be recommended  A screening test is done to check for diseases that may not cause symptoms  The screening tests you may need depend on your age, gender, family history, and lifestyle habits  For example, colorectal screening may be recommended if you are 48years old or older    Screening tests you need if you are a woman:   · A Pap smear  is used to screen for cervical cancer  Pap smears are usually done every 3 to 5 years depending on your age  You may need them more often if you have had abnormal Pap smear test results in the past  Ask your healthcare provider how often you should have a Pap smear  · A mammogram  is an x-ray of your breasts to screen for breast cancer  Experts recommend mammograms every 2 years starting at age 48 years  You may need a mammogram at age 52 years or younger if you have an increased risk for breast cancer  Talk to your healthcare provider about when you should start having mammograms and how often you need them  Vaccines you may need:   · Get an influenza vaccine  every year  The influenza vaccine protects you from the flu  Several types of viruses cause the flu  The viruses change over time, so new vaccines are made each year  · Get a tetanus-diphtheria (Td) booster vaccine  every 10 years  This vaccine protects you against tetanus and diphtheria  Tetanus is a severe infection that may cause painful muscle spasms and lockjaw  Diphtheria is a severe bacterial infection that causes a thick covering in the back of your mouth and throat  · Get a human papillomavirus (HPV) vaccine  if you are female and aged 23 to 32 or male 23 to 24 and never received it  This vaccine protects you from HPV infection  HPV is the most common infection spread by sexual contact  HPV may also cause vaginal, penile, and anal cancers  · Get a pneumococcal vaccine  if you are aged 72 years or older  The pneumococcal vaccine is an injection given to protect you from pneumococcal disease  Pneumococcal disease is an infection caused by pneumococcal bacteria  The infection may cause pneumonia, meningitis, or an ear infection  · Get a shingles vaccine  if you are aged 61 or older, even if you have had shingles before  The shingles vaccine is an injection to protect you from the varicella-zoster virus  This is the same virus that causes chickenpox  Shingles is a painful rash that develops in people who had chickenpox or have been exposed to the virus  How to eat healthy:  My Plate is a model for planning healthy meals  It shows the types and amounts of foods that should go on your plate  Fruits and vegetables make up about half of your plate, and grains and protein make up the other half  A serving of dairy is included on the side of your plate  The amount of calories and serving sizes you need depends on your age, gender, weight, and height  Examples of healthy foods are listed below:  · Eat a variety of vegetables  such as dark green, red, and orange vegetables  You can also include canned vegetables low in sodium (salt) and frozen vegetables without added butter or sauces  · Eat a variety of fresh fruits , canned fruit in 100% juice, frozen fruit, and dried fruit  · Include whole grains  At least half of the grains you eat should be whole grains  Examples include whole-wheat bread, wheat pasta, brown rice, and whole-grain cereals such as oatmeal     · Eat a variety of protein foods such as seafood (fish and shellfish), lean meat, and poultry without skin (turkey and chicken)  Examples of lean meats include pork leg, shoulder, or tenderloin, and beef round, sirloin, tenderloin, and extra lean ground beef  Other protein foods include eggs and egg substitutes, beans, peas, soy products, nuts, and seeds  · Choose low-fat dairy products such as skim or 1% milk or low-fat yogurt, cheese, and cottage cheese  · Limit unhealthy fats  such as butter, hard margarine, and shortening  Exercise:  Exercise at least 30 minutes per day on most days of the week  Some examples of exercise include walking, biking, dancing, and swimming  You can also fit in more physical activity by taking the stairs instead of the elevator or parking farther away from stores  Include muscle strengthening activities 2 days each week   Regular exercise provides many health benefits  It helps you manage your weight, and decreases your risk for type 2 diabetes, heart disease, stroke, and high blood pressure  Exercise can also help improve your mood  Ask your healthcare provider about the best exercise plan for you  General health and safety guidelines:   · Do not smoke  Nicotine and other chemicals in cigarettes and cigars can cause lung damage  Ask your healthcare provider for information if you currently smoke and need help to quit  E-cigarettes or smokeless tobacco still contain nicotine  Talk to your healthcare provider before you use these products  · Limit alcohol  A drink of alcohol is 12 ounces of beer, 5 ounces of wine, or 1½ ounces of liquor  · Lose weight, if needed  Being overweight increases your risk of certain health conditions  These include heart disease, high blood pressure, type 2 diabetes, and certain types of cancer  · Protect your skin  Do not sunbathe or use tanning beds  Use sunscreen with a SPF 15 or higher  Apply sunscreen at least 15 minutes before you go outside  Reapply sunscreen every 2 hours  Wear protective clothing, hats, and sunglasses when you are outside  · Drive safely  Always wear your seatbelt  Make sure everyone in your car wears a seatbelt  A seatbelt can save your life if you are in an accident  Do not use your cell phone when you are driving  This could distract you and cause an accident  Pull over if you need to make a call or send a text message  · Practice safe sex  Use latex condoms if are sexually active and have more than one partner  Your healthcare provider may recommend screening tests for sexually transmitted infections (STIs)  · Wear helmets, lifejackets, and protective gear  Always wear a helmet when you ride a bike or motorcycle, go skiing, or play sports that could cause a head injury  Wear protective equipment when you play sports  Wear a lifejacket when you are on a boat or doing water sports    © 2017 [facility] Information is for End User's use only and may not be sold, redistributed or otherwise used for commercial purposes  All illustrations and images included in CareNotes® are the copyrighted property of A [employer] M [employer]  or Rudy Martinez  The above information is an  only  It is not intended as medical advice for individual conditions or treatments  Talk to your doctor, nurse or pharmacist before following any medical regimen to see if it is safe and effective for you  Wellness Visit for Adults   AMBULATORY CARE:   A wellness visit  is when you see your healthcare provider to get screened for health problems  You can also get advice on how to stay healthy  Write down your questions so you remember to ask them  Ask your healthcare provider how often you should have a wellness visit  What happens at a wellness visit:  Your healthcare provider will ask about your health, and your family history of health problems  This includes high blood pressure, heart disease, and cancer  He or she will ask if you have symptoms that concern you, if you smoke, and about your mood  You may also be asked about your intake of medicines, supplements, food, and alcohol  Any of the following may be done:  · Your weight  will be checked  Your height may also be checked so your body mass index (BMI) can be calculated  Your BMI shows if you are at a healthy weight  · Your blood pressure  and heart rate will be checked  Your temperature may also be checked  · Blood and urine tests  may be done  Blood tests may be done to check your cholesterol levels  Abnormal cholesterol levels increase your risk for heart disease and stroke  You may also need a blood or urine test to check for diabetes if you are at increased risk  Urine tests may be done to look for signs of an infection or kidney disease  · A physical exam  includes checking your heartbeat and lungs with a stethoscope  Your healthcare provider may also check your skin to look for sun damage  · Screening tests  may be recommended  A screening test is done to check for diseases that may not cause symptoms  The screening tests you may need depend on your age, gender, family history, and lifestyle habits  For example, colorectal screening may be recommended if you are 48years old or older  Screening tests you need if you are a woman:   · A Pap smear  is used to screen for cervical cancer  Pap smears are usually done every 3 to 5 years depending on your age  You may need them more often if you have had abnormal Pap smear test results in the past  Ask your healthcare provider how often you should have a Pap smear  · A mammogram  is an x-ray of your breasts to screen for breast cancer  Experts recommend mammograms every 2 years starting at age 48 years  You may need a mammogram at age 52 years or younger if you have an increased risk for breast cancer  Talk to your healthcare provider about when you should start having mammograms and how often you need them  Vaccines you may need:   · Get an influenza vaccine  every year  The influenza vaccine protects you from the flu  Several types of viruses cause the flu  The viruses change over time, so new vaccines are made each year  · Get a tetanus-diphtheria (Td) booster vaccine  every 10 years  This vaccine protects you against tetanus and diphtheria  Tetanus is a severe infection that may cause painful muscle spasms and lockjaw  Diphtheria is a severe bacterial infection that causes a thick covering in the back of your mouth and throat  · Get a human papillomavirus (HPV) vaccine  if you are female and aged 23 to 32 or male 23 to 24 and never received it  This vaccine protects you from HPV infection  HPV is the most common infection spread by sexual contact  HPV may also cause vaginal, penile, and anal cancers      · Get a pneumococcal vaccine  if you are aged 72 years or older  The pneumococcal vaccine is an injection given to protect you from pneumococcal disease  Pneumococcal disease is an infection caused by pneumococcal bacteria  The infection may cause pneumonia, meningitis, or an ear infection  · Get a shingles vaccine  if you are aged 61 or older, even if you have had shingles before  The shingles vaccine is an injection to protect you from the varicella-zoster virus  This is the same virus that causes chickenpox  Shingles is a painful rash that develops in people who had chickenpox or have been exposed to the virus  How to eat healthy:  My Plate is a model for planning healthy meals  It shows the types and amounts of foods that should go on your plate  Fruits and vegetables make up about half of your plate, and grains and protein make up the other half  A serving of dairy is included on the side of your plate  The amount of calories and serving sizes you need depends on your age, gender, weight, and height  Examples of healthy foods are listed below:  · Eat a variety of vegetables  such as dark green, red, and orange vegetables  You can also include canned vegetables low in sodium (salt) and frozen vegetables without added butter or sauces  · Eat a variety of fresh fruits , canned fruit in 100% juice, frozen fruit, and dried fruit  · Include whole grains  At least half of the grains you eat should be whole grains  Examples include whole-wheat bread, wheat pasta, brown rice, and whole-grain cereals such as oatmeal     · Eat a variety of protein foods such as seafood (fish and shellfish), lean meat, and poultry without skin (turkey and chicken)  Examples of lean meats include pork leg, shoulder, or tenderloin, and beef round, sirloin, tenderloin, and extra lean ground beef  Other protein foods include eggs and egg substitutes, beans, peas, soy products, nuts, and seeds       · Choose low-fat dairy products such as skim or 1% milk or low-fat yogurt, cheese, and cottage cheese  · Limit unhealthy fats  such as butter, hard margarine, and shortening  Exercise:  Exercise at least 30 minutes per day on most days of the week  Some examples of exercise include walking, biking, dancing, and swimming  You can also fit in more physical activity by taking the stairs instead of the elevator or parking farther away from stores  Include muscle strengthening activities 2 days each week  Regular exercise provides many health benefits  It helps you manage your weight, and decreases your risk for type 2 diabetes, heart disease, stroke, and high blood pressure  Exercise can also help improve your mood  Ask your healthcare provider about the best exercise plan for you  General health and safety guidelines:   · Do not smoke  Nicotine and other chemicals in cigarettes and cigars can cause lung damage  Ask your healthcare provider for information if you currently smoke and need help to quit  E-cigarettes or smokeless tobacco still contain nicotine  Talk to your healthcare provider before you use these products  · Limit alcohol  A drink of alcohol is 12 ounces of beer, 5 ounces of wine, or 1½ ounces of liquor  · Lose weight, if needed  Being overweight increases your risk of certain health conditions  These include heart disease, high blood pressure, type 2 diabetes, and certain types of cancer  · Protect your skin  Do not sunbathe or use tanning beds  Use sunscreen with a SPF 15 or higher  Apply sunscreen at least 15 minutes before you go outside  Reapply sunscreen every 2 hours  Wear protective clothing, hats, and sunglasses when you are outside  · Drive safely  Always wear your seatbelt  Make sure everyone in your car wears a seatbelt  A seatbelt can save your life if you are in an accident  Do not use your cell phone when you are driving  This could distract you and cause an accident  Pull over if you need to make a call or send a text message       · Practice safe sex  Use latex condoms if are sexually active and have more than one partner  Your healthcare provider may recommend screening tests for sexually transmitted infections (STIs)  · Wear helmets, lifejackets, and protective gear  Always wear a helmet when you ride a bike or motorcycle, go skiing, or play sports that could cause a head injury  Wear protective equipment when you play sports  Wear a lifejacket when you are on a boat or doing water sports  © 2017 [facility] Information is for End User's use only and may not be sold, redistributed or otherwise used for commercial purposes  All illustrations and images included in CareNotes® are the copyrighted property of [employer] M [employer]  or [employer]y Martinez  The above information is an  only  It is not intended as medical advice for individual conditions or treatments  Talk to your doctor, nurse or pharmacist before following any medical regimen to see if it is safe and effective for you

## 2020-08-10 NOTE — PROGRESS NOTES
ADULT ANNUAL PHYSICAL  [address]     NAME: Rogelio Becerra  AGE: 30 y o  SEX: female  : 1990     DATE: 8/10/2020     Assessment and Plan:     Problem List Items Addressed This Visit     None      Visit Diagnoses     Annual physical exam    -  Primary          Immunizations and preventive care screenings were discussed with patient today  Appropriate education was printed on patient's after visit summary  Counseling:  Alcohol/drug use: discussed moderation in alcohol intake, the recommendations for healthy alcohol use, and avoidance of illicit drug use  Dental Health: discussed importance of regular tooth brushing, flossing, and dental visits  Injury prevention: discussed safety/seat belts, safety helmets, smoke detectors, carbon dioxide detectors, and smoking near bedding or upholstery  Sexual health: discussed sexually transmitted diseases, partner selection, use of condoms, avoidance of unintended pregnancy, and contraceptive alternatives  · Exercise: the importance of regular exercise/physical activity was discussed  Recommend exercise 3-5 times per week for at least 30 minutes  BMI Counseling: Body mass index is 40 06 kg/m²  The BMI is above normal  Nutrition recommendations include decreasing portion sizes, consuming healthier snacks, moderation in carbohydrate intake and increasing intake of lean protein  Exercise recommendations include exercising 3-5 times per week  Return in about 1 year (around 8/10/2021) for Annual physical      Chief Complaint:     Chief Complaint   Patient presents with    Physical Exam      History of Present Illness:     Adult Annual Physical   Patient here for a comprehensive physical exam  The patient reports no problems  Diet and Physical Activity  · Diet/Nutrition: well balanced diet  · Exercise: 1-2 times a week on average        Depression Screening  PHQ-9 Depression Screening    PHQ-9:    Frequency of the following problems over the past two weeks:       Little interest or pleasure in doing things:  0 - not at all  Feeling down, depressed, or hopeless:  0 - not at all  PHQ-2 Score:  0       General Health  · Sleep: gets 4-6 hours of sleep on average  · Hearing: normal - none   · Vision: goes for regular eye exams  · Dental: regular dental visits  /GYN Health  · Last menstrual period:last month in July-cant remember date  · Contraceptive method: oral contraceptives  · History of STDs?: no      Review of Systems:     Review of Systems   Constitutional: Negative for activity change, diaphoresis, fatigue and fever  HENT: Negative for congestion, facial swelling, hearing loss, rhinorrhea, sinus pressure, sinus pain, sneezing, sore throat and voice change  Eyes: Negative for discharge and visual disturbance  Respiratory: Negative for cough, choking, chest tightness, shortness of breath, wheezing and stridor  Cardiovascular: Negative for chest pain, palpitations and leg swelling  Gastrointestinal: Negative for abdominal distention, abdominal pain, constipation, diarrhea, nausea and vomiting  Endocrine: Negative for polydipsia, polyphagia and polyuria  Genitourinary: Negative for difficulty urinating, dysuria, frequency and urgency  Musculoskeletal: Negative for arthralgias, back pain, gait problem, joint swelling, myalgias, neck pain and neck stiffness  Skin: Negative for color change, rash and wound  Neurological: Negative for dizziness, syncope, speech difficulty, weakness, light-headedness and headaches  Hematological: Negative for adenopathy  Does not bruise/bleed easily  Psychiatric/Behavioral: Negative for agitation, behavioral problems, confusion, hallucinations, sleep disturbance and suicidal ideas  The patient is not nervous/anxious         Past Medical History:     Past Medical History:   Diagnosis Date    Hypothyroid       Past Surgical History:     Past Surgical History:   Procedure Laterality Date     SECTION      x 3    DILATION AND CURETTAGE OF UTERUS        Social History:        Social History     Socioeconomic History    Marital status: /Civil Union     Spouse name: None    Number of children: 3    Years of education: None    Highest education level: None   Occupational History    None   Social Needs    Financial resource strain: None    Food insecurity     Worry: None     Inability: None    Transportation needs     Medical: None     Non-medical: None   Tobacco Use    Smoking status: Never Smoker    Smokeless tobacco: Never Used   Substance and Sexual Activity    Alcohol use: No    Drug use: No    Sexual activity: Yes     Birth control/protection: Pill   Lifestyle    Physical activity     Days per week: None     Minutes per session: None    Stress: None   Relationships    Social connections     Talks on phone: None     Gets together: None     Attends Alevism service: None     Active member of club or organization: None     Attends meetings of clubs or organizations: None     Relationship status: None    Intimate partner violence     Fear of current or ex partner: None     Emotionally abused: None     Physically abused: None     Forced sexual activity: None   Other Topics Concern    None   Social History Narrative    Always uses seat belt    Caffeine use    No Alevism beliefs      Family History:     Family History   Problem Relation Age of Onset    Skin cancer Mother     Diabetes Family     No Known Problems Father       Current Medications:     Current Outpatient Medications   Medication Sig Dispense Refill    acetaminophen (TYLENOL) 500 mg tablet Take 1,000 mg by mouth every 6 (six) hours as needed for mild pain      levonorgestrel-ethinyl estradiol (SEASONALE) 0 15-0 03 MG per tablet Take 1 tablet by mouth daily 91 tablet 3    levothyroxine 50 mcg tablet Take 1 tablet (50 mcg total) by mouth daily 30 tablet 0    Multiple Vitamins-Minerals (MULTIVITAMIN ADULT PO) Take 1 tablet by mouth daily      promethazine-dextromethorphan (PHENERGAN-DM) 6 25-15 mg/5 mL oral syrup Take 5 mL by mouth 4 (four) times a day as needed for cough (Patient not taking: Reported on 8/10/2020) 180 mL 0     No current facility-administered medications for this visit  Allergies:     No Known Allergies   Physical Exam:     /80 (BP Location: Left arm, Patient Position: Sitting, Cuff Size: Standard)   Pulse 86   Temp 98 6 °F (37 °C) (Tympanic)   Ht 5' 2" (1 575 m)   Wt 99 3 kg (219 lb)   SpO2 98%   BMI 40 06 kg/m²     Physical Exam  Vitals signs and nursing note reviewed  Exam conducted with a chaperone present  Constitutional:       General: She is not in acute distress  Appearance: Normal appearance  She is not ill-appearing, toxic-appearing or diaphoretic  HENT:      Head: Normocephalic  Right Ear: Tympanic membrane, ear canal and external ear normal  There is no impacted cerumen  Left Ear: Tympanic membrane, ear canal and external ear normal  There is no impacted cerumen  Nose: Nose normal       Mouth/Throat:      Mouth: Mucous membranes are moist       Pharynx: Oropharynx is clear  No oropharyngeal exudate or posterior oropharyngeal erythema  Eyes:      General: No scleral icterus  Right eye: No discharge  Left eye: No discharge  Extraocular Movements: Extraocular movements intact  Conjunctiva/sclera: Conjunctivae normal       Pupils: Pupils are equal, round, and reactive to light  Neck:      Musculoskeletal: Normal range of motion and neck supple  No neck rigidity or muscular tenderness  Vascular: No carotid bruit  Cardiovascular:      Rate and Rhythm: Normal rate and regular rhythm  Pulses: Normal pulses  Heart sounds: Normal heart sounds  No murmur  No friction rub  No gallop      Pulmonary:      Effort: Pulmonary effort is normal  No respiratory distress  Breath sounds: No wheezing or rhonchi  Chest:      Chest wall: No tenderness  Abdominal:      General: There is no distension  Palpations: Abdomen is soft  There is no mass  Tenderness: There is no abdominal tenderness  There is no right CVA tenderness, left CVA tenderness, guarding or rebound  Hernia: No hernia is present  Musculoskeletal: Normal range of motion  General: No swelling or tenderness  Right lower leg: No edema  Left lower leg: No edema  Lymphadenopathy:      Cervical: No cervical adenopathy  Skin:     General: Skin is warm and dry  Coloration: Skin is not pale  Findings: No bruising, erythema, lesion or rash  Neurological:      General: No focal deficit present  Mental Status: She is alert and oriented to person, place, and time  Cranial Nerves: No cranial nerve deficit  Sensory: No sensory deficit  Motor: No weakness  Coordination: Coordination normal       Gait: Gait normal       Deep Tendon Reflexes: Reflexes normal    Psychiatric:         Mood and Affect: Mood normal          Behavior: Behavior normal          Thought Content:  Thought content normal          Judgment: Judgment normal         PHQ-9 Depression Screening    PHQ-9:    Frequency of the following problems over the past two weeks:       Little interest or pleasure in doing things:  0 - not at all  Feeling down, depressed, or hopeless:  0 - not at all  PHQ-2 Score:  0           Deloris Sutton, [facility]

## 2020-08-13 LAB
ALBUMIN SERPL-MCNC: 4.1 G/DL (ref 3.8–4.8)
ALBUMIN/GLOB SERPL: 1.7 {RATIO} (ref 1.2–2.2)
ALP SERPL-CCNC: 76 IU/L (ref 39–117)
ALT SERPL-CCNC: 23 IU/L (ref 0–32)
AST SERPL-CCNC: 16 IU/L (ref 0–40)
BILIRUB SERPL-MCNC: 0.3 MG/DL (ref 0–1.2)
BUN SERPL-MCNC: 12 MG/DL (ref 6–20)
BUN/CREAT SERPL: 16 (ref 9–23)
CALCIUM SERPL-MCNC: 9.4 MG/DL (ref 8.7–10.2)
CHLORIDE SERPL-SCNC: 103 MMOL/L (ref 96–106)
CHOLEST SERPL-MCNC: 182 MG/DL (ref 100–199)
CHOLEST/HDLC SERPL: 3.2 RATIO (ref 0–4.4)
CO2 SERPL-SCNC: 23 MMOL/L (ref 20–29)
CREAT SERPL-MCNC: 0.77 MG/DL (ref 0.57–1)
GLOBULIN SER-MCNC: 2.4 G/DL (ref 1.5–4.5)
GLUCOSE SERPL-MCNC: 81 MG/DL (ref 65–99)
HDLC SERPL-MCNC: 57 MG/DL
LDLC SERPL CALC-MCNC: 101 MG/DL (ref 0–99)
POTASSIUM SERPL-SCNC: 4.7 MMOL/L (ref 3.5–5.2)
PROT SERPL-MCNC: 6.5 G/DL (ref 6–8.5)
SL AMB EGFR AFRICAN AMERICAN: 119 ML/MIN/1.73
SL AMB EGFR NON AFRICAN AMERICAN: 103 ML/MIN/1.73
SL AMB VLDL CHOLESTEROL CALC: 24 MG/DL (ref 5–40)
SODIUM SERPL-SCNC: 139 MMOL/L (ref 134–144)
TRIGL SERPL-MCNC: 119 MG/DL (ref 0–149)
TSH SERPL DL<=0.005 MIU/L-ACNC: 4.44 UIU/ML (ref 0.45–4.5)

## 2020-08-14 ENCOUNTER — TELEPHONE (OUTPATIENT)
Dept: FAMILY MEDICINE CLINIC | Facility: CLINIC | Age: 30
End: 2020-08-14

## 2020-08-14 DIAGNOSIS — E03.9 HYPOTHYROIDISM, UNSPECIFIED TYPE: Primary | ICD-10-CM

## 2020-08-14 NOTE — TELEPHONE ENCOUNTER
----- Message from [address] [place] [address],[address] sent at 8/14/2020  8:21 AM EDT -----  Call patient  Labs within normal limits  CMP, lipids in 12 months  TSH in 6 months  Continue current dose of levothyroxine

## 2020-08-22 ENCOUNTER — HOSPITAL ENCOUNTER (EMERGENCY)
Facility: HOSPITAL | Age: 30
Discharge: HOME | End: 2020-08-22
Attending: EMERGENCY MEDICINE | Admitting: EMERGENCY MEDICINE
Payer: COMMERCIAL

## 2020-08-22 VITALS
WEIGHT: 219 LBS | HEIGHT: 62 IN | OXYGEN SATURATION: 97 % | HEART RATE: 83 BPM | RESPIRATION RATE: 18 BRPM | BODY MASS INDEX: 40.3 KG/M2 | SYSTOLIC BLOOD PRESSURE: 157 MMHG | DIASTOLIC BLOOD PRESSURE: 77 MMHG | TEMPERATURE: 97.2 F

## 2020-08-22 DIAGNOSIS — E03.9 HYPOTHYROIDISM, UNSPECIFIED TYPE: ICD-10-CM

## 2020-08-22 DIAGNOSIS — K04.7 DENTAL ABSCESS: Primary | ICD-10-CM

## 2020-08-22 PROCEDURE — 99282 EMERGENCY DEPT VISIT SF MDM: CPT

## 2020-08-22 PROCEDURE — 99284 EMERGENCY DEPT VISIT MOD MDM: CPT | Performed by: EMERGENCY MEDICINE

## 2020-08-22 RX ORDER — AMOXICILLIN AND CLAVULANATE POTASSIUM 875; 125 MG/1; MG/1
1 TABLET, FILM COATED ORAL EVERY 12 HOURS
Qty: 14 TABLET | Refills: 0 | Status: SHIPPED | OUTPATIENT
Start: 2020-08-22 | End: 2020-08-29

## 2020-08-22 RX ORDER — AMOXICILLIN AND CLAVULANATE POTASSIUM 875; 125 MG/1; MG/1
1 TABLET, FILM COATED ORAL ONCE
Status: COMPLETED | OUTPATIENT
Start: 2020-08-22 | End: 2020-08-22

## 2020-08-22 RX ADMIN — AMOXICILLIN AND CLAVULANATE POTASSIUM 1 TABLET: 875; 125 TABLET, FILM COATED ORAL at 07:55

## 2020-08-22 NOTE — ED PROVIDER NOTES
History  Chief Complaint   Patient presents with    Dental Pain     Patient states that she has warmness and pain and swelling left side of mouth  This is a 20-year-old female who presents with left lower molar pain increasing over the past 3 days no other complaints at this time no dental follow-up      History provided by:  Patient  Medical Problem   Location:  Left lower molar  Quality:  Pain and swelling  Severity:  Moderate  Onset quality:  Gradual  Timing:  Constant  Progression:  Worsening  Chronicity:  New  Context:  Increasing dental pain and swelling      Prior to Admission Medications   Prescriptions Last Dose Informant Patient Reported? Taking? Multiple Vitamins-Minerals (MULTIVITAMIN ADULT PO)  Self Yes No   Sig: Take 1 tablet by mouth daily   acetaminophen (TYLENOL) 500 mg tablet   Yes No   Sig: Take 1,000 mg by mouth every 6 (six) hours as needed for mild pain   levonorgestrel-ethinyl estradiol (SEASONALE) 0 15-0 03 MG per tablet   No No   Sig: Take 1 tablet by mouth daily   levothyroxine 50 mcg tablet   No No   Sig: Take 1 tablet (50 mcg total) by mouth daily      Facility-Administered Medications: None       Past Medical History:   Diagnosis Date    Hypothyroid        Past Surgical History:   Procedure Laterality Date     SECTION      x 3    DILATION AND CURETTAGE OF UTERUS         Family History   Problem Relation Age of Onset    Skin cancer Mother     Diabetes Family     No Known Problems Father      I have reviewed and agree with the history as documented  E-Cigarette/Vaping     E-Cigarette/Vaping Substances     Social History     Tobacco Use    Smoking status: Former Smoker    Smokeless tobacco: Never Used   Substance Use Topics    Alcohol use: No    Drug use: No       Review of Systems   HENT: Positive for dental problem  All other systems reviewed and are negative  Physical Exam  Physical Exam  Vitals signs and nursing note reviewed     Constitutional: General: She is not in acute distress  Appearance: She is not ill-appearing, toxic-appearing or diaphoretic  HENT:      Head: Normocephalic and atraumatic  Right Ear: Tympanic membrane, ear canal and external ear normal  There is no impacted cerumen  Left Ear: Tympanic membrane, ear canal and external ear normal  There is no impacted cerumen  Mouth/Throat:      Comments: Left lower molar decay with mild gingival swelling consistent with abscess  Eyes:      General: No scleral icterus  Right eye: No discharge  Left eye: No discharge  Extraocular Movements: Extraocular movements intact  Pupils: Pupils are equal, round, and reactive to light  Neck:      Musculoskeletal: Neck supple  Cardiovascular:      Rate and Rhythm: Normal rate  Heart sounds: No murmur  No friction rub  No gallop  Pulmonary:      Effort: No respiratory distress  Breath sounds: No stridor  No wheezing, rhonchi or rales  Abdominal:      General: There is no distension  Tenderness: There is no abdominal tenderness  There is no guarding or rebound  Musculoskeletal: Normal range of motion  General: No swelling, tenderness, deformity or signs of injury  Right lower leg: No edema  Left lower leg: No edema  Skin:     General: Skin is warm and dry  Findings: No rash  Neurological:      General: No focal deficit present  Mental Status: She is alert and oriented to person, place, and time  Cranial Nerves: No cranial nerve deficit  Motor: No weakness  Coordination: Coordination normal    Psychiatric:         Mood and Affect: Mood normal          Behavior: Behavior normal          Thought Content:  Thought content normal          Vital Signs  ED Triage Vitals   Temperature Pulse Respirations Blood Pressure SpO2   08/22/20 0730 08/22/20 0730 08/22/20 0730 08/22/20 0732 08/22/20 0730   (!) 97 2 °F (36 2 °C) 83 18 157/77 97 %      Temp Source Heart Rate Source Patient Position - Orthostatic VS BP Location FiO2 (%)   08/22/20 0730 -- -- -- --   Temporal          Pain Score       08/22/20 0733       3           Vitals:    08/22/20 0730 08/22/20 0732   BP:  157/77   Pulse: 83          Visual Acuity      ED Medications  Medications   amoxicillin-clavulanate (AUGMENTIN) 875-125 mg per tablet 1 tablet (1 tablet Oral Given 8/22/20 0755)       Diagnostic Studies  Results Reviewed     None                 No orders to display              Procedures  Procedures         ED Course       US AUDIT      Most Recent Value   Initial Alcohol Screen: US AUDIT-C    1  How often do you have a drink containing alcohol?  0 Filed at: 08/22/2020 0731   2  How many drinks containing alcohol do you have on a typical day you are drinking? 0 Filed at: 08/22/2020 0731   3a  Male UNDER 65: How often do you have five or more drinks on one occasion? 0 Filed at: 08/22/2020 0731   3b  FEMALE Any Age, or MALE 65+: How often do you have 4 or more drinks on one occassion? 0 Filed at: 08/22/2020 0731   Audit-C Score  0 Filed at: 08/22/2020 5963                  JUANIS/DAST-10      Most Recent Value   How many times in the past year have you    Used an illegal drug or used a prescription medication for non-medical reasons? Never Filed at: 08/22/2020 8058                                MDM      Disposition  Final diagnoses:   Dental abscess - Left lower molar     Time reflects when diagnosis was documented in both MDM as applicable and the Disposition within this note     Time User Action Codes Description Comment    8/22/2020  7:50 AM Richa Viramontes Add [K04 7] Dental abscess     8/22/2020  7:50 AM Richa Viramontes Modify [K04 7] Dental abscess Left lower molar      ED Disposition     ED Disposition Condition Date/Time Comment    Discharge Stable Sat Aug 22, 2020  7:51 AM Gonzales Dahl discharge to home/self care              Follow-up Information     Follow up With Specialties Details [facility]  In 3 days  [address] [address]  [phone]          Discharge Medication List as of 8/22/2020  7:52 AM      START taking these medications    Details   amoxicillin-clavulanate (AUGMENTIN) 875-125 mg per tablet Take 1 tablet by mouth every 12 (twelve) hours for 7 days, Starting Sat 8/22/2020, Until Sat 8/29/2020, Print         CONTINUE these medications which have NOT CHANGED    Details   acetaminophen (TYLENOL) 500 mg tablet Take 1,000 mg by mouth every 6 (six) hours as needed for mild pain, Until Discontinued, Historical Med      levonorgestrel-ethinyl estradiol (SEASONALE) 0 15-0 03 MG per tablet Take 1 tablet by mouth daily, Starting Thu 4/12/2018, Normal      levothyroxine 50 mcg tablet Take 1 tablet (50 mcg total) by mouth daily, Starting Tue 7/21/2020, Normal      Multiple Vitamins-Minerals (MULTIVITAMIN ADULT PO) Take 1 tablet by mouth daily, Historical Med           No discharge procedures on file      PDMP Review     None          ED Provider  Electronically Signed by           Boogie Nelson DO  08/22/20 6105

## 2020-08-24 RX ORDER — LEVOTHYROXINE SODIUM 0.05 MG/1
50 TABLET ORAL DAILY
Qty: 90 TABLET | Refills: 0 | Status: SHIPPED | OUTPATIENT
Start: 2020-08-24 | End: 2020-12-03 | Stop reason: SDUPTHER

## 2020-08-26 ENCOUNTER — VBI (OUTPATIENT)
Dept: FAMILY MEDICINE CLINIC | Facility: CLINIC | Age: 30
End: 2020-08-26

## 2020-08-26 NOTE — TELEPHONE ENCOUNTER
Alecia Pyle    ED Visit Information     Ed visit date: 8/22/2020  Diagnosis Description:   Dental abscess [Left lower molar]      In Network? Yes [address]  Discharge status: Home  Discharged with meds ? Yes  Number of ED visits to date: 1  ED Severity:N/a     Outreach Information    Outreach successful: No 2  Date letter mailed:N/a  Date Finalized:8/28/2020    Care Coordination    Follow up appointment with pcp: no No ED f/u appt scheduled  Transportation issues ? NA    Value [employer] type:  7 Day Outreach  Emergent necessity warranted by diagnosis:  No  [facility] PCP:  Yes  Transportation:  Friend/Family Transport  08/26/2020 02:02 PM Phone ([facility]) Carolee Gilliam (Self) [phone] (H)   Left Message  Unable to reach patient regarding recent ED visit on 8/22/2020 for Dental abscess [Left lower molar]  2nd attempt will be made on 8/27/2020 08/28/2020 11:53 AM Phone ([facility]) Carolee Gilliam (Self) [phone] (H)   Left Message  Unable to reach patient regarding recent ED visit on 8/22/2020 for Dental abscess [Left lower molar]  ED follow up letter not mailed due to COVID 19 due to stay in place order

## 2020-12-03 DIAGNOSIS — E03.9 HYPOTHYROIDISM, UNSPECIFIED TYPE: ICD-10-CM

## 2020-12-04 RX ORDER — LEVOTHYROXINE SODIUM 0.05 MG/1
50 TABLET ORAL DAILY
Qty: 90 TABLET | Refills: 0 | Status: SHIPPED | OUTPATIENT
Start: 2020-12-04 | End: 2021-03-02 | Stop reason: SDUPTHER

## 2021-03-02 DIAGNOSIS — E03.9 HYPOTHYROIDISM, UNSPECIFIED TYPE: ICD-10-CM

## 2021-03-02 RX ORDER — LEVOTHYROXINE SODIUM 0.05 MG/1
50 TABLET ORAL DAILY
Qty: 30 TABLET | Refills: 0 | Status: SHIPPED | OUTPATIENT
Start: 2021-03-02 | End: 2021-04-16 | Stop reason: SDUPTHER

## 2021-03-31 DIAGNOSIS — Z23 ENCOUNTER FOR IMMUNIZATION: ICD-10-CM

## 2021-04-16 ENCOUNTER — TELEPHONE (OUTPATIENT)
Dept: FAMILY MEDICINE CLINIC | Facility: CLINIC | Age: 31
End: 2021-04-16

## 2021-04-16 DIAGNOSIS — E03.9 HYPOTHYROIDISM, UNSPECIFIED TYPE: ICD-10-CM

## 2021-04-16 LAB — TSH SERPL DL<=0.005 MIU/L-ACNC: 2.16 UIU/ML (ref 0.45–4.5)

## 2021-04-16 RX ORDER — LEVOTHYROXINE SODIUM 0.05 MG/1
50 TABLET ORAL DAILY
Qty: 30 TABLET | Refills: 9 | Status: SHIPPED | OUTPATIENT
Start: 2021-04-16 | End: 2022-03-19 | Stop reason: SDUPTHER

## 2021-04-16 NOTE — TELEPHONE ENCOUNTER
----- Message from [address] [place] [address],[address] sent at 4/16/2021 10:04 AM EDT -----  Call pt  TSH is within normal range  Continue Levothyroxine 50mcg daily

## 2021-04-16 NOTE — TELEPHONE ENCOUNTER
----- Message from [address] [place] [address],[address] sent at 4/16/2021 10:04 AM EDT -----  Call pt  TSH is within normal range  Continue Levothyroxine 50mcg daily

## 2021-04-19 ENCOUNTER — TELEMEDICINE (OUTPATIENT)
Dept: FAMILY MEDICINE CLINIC | Facility: CLINIC | Age: 31
End: 2021-04-19
Payer: COMMERCIAL

## 2021-04-19 VITALS — WEIGHT: 219 LBS | HEIGHT: 62 IN | BODY MASS INDEX: 40.3 KG/M2

## 2021-04-19 DIAGNOSIS — Z20.822 EXPOSURE TO COVID-19 VIRUS: ICD-10-CM

## 2021-04-19 DIAGNOSIS — Z20.822 EXPOSURE TO COVID-19 VIRUS: Primary | ICD-10-CM

## 2021-04-19 PROCEDURE — U0005 INFEC AGEN DETEC AMPLI PROBE: HCPCS | Performed by: NURSE PRACTITIONER

## 2021-04-19 PROCEDURE — 99213 OFFICE O/P EST LOW 20 MIN: CPT | Performed by: NURSE PRACTITIONER

## 2021-04-19 PROCEDURE — 3008F BODY MASS INDEX DOCD: CPT | Performed by: NURSE PRACTITIONER

## 2021-04-19 PROCEDURE — U0003 INFECTIOUS AGENT DETECTION BY NUCLEIC ACID (DNA OR RNA); SEVERE ACUTE RESPIRATORY SYNDROME CORONAVIRUS 2 (SARS-COV-2) (CORONAVIRUS DISEASE [COVID-19]), AMPLIFIED PROBE TECHNIQUE, MAKING USE OF HIGH THROUGHPUT TECHNOLOGIES AS DESCRIBED BY CMS-2020-01-R: HCPCS | Performed by: NURSE PRACTITIONER

## 2021-04-19 NOTE — PATIENT INSTRUCTIONS
Covid test as ordered  Office will call you with results  Remain home and quarantine as directed  OTC cold/cough medications as directed to treat your symptoms  Call with any questions or concerns

## 2021-04-19 NOTE — PROGRESS NOTES
COVID-19 Outpatient Progress Note    Assessment/Plan:    Problem List Items Addressed This Visit     None      Visit Diagnoses     Exposure to COVID-19 virus    -  Primary    Relevant Orders    Novel Coronavirus (Covid-19),PCR [facility] - Collected at [facility] or [facility]         Disposition:     I recommended COVID-19 PCR testing on or after day 5 since last exposure and if negative can end quarantine after 7 days  Patient was instructed to watch for symptoms until 14 days after exposure  If patient were to develop symptoms, they should immediately self isolate and call our office for further guidance  I referred patient to one of our centralized sites for a COVID-19 swab  Covid test as ordered  Office will call you with results  Remain home and quarantine as directed  OTC cold/cough medications as directed to treat your symptoms  Call with any questions or concerns  I have spent 15 minutes directly with the patient  Greater than 50% of this time was spent in counseling/coordination of care regarding: instructions for management and patient and family education  Encounter provider RADHA Becerra    Provider located at [address] [place], [place] [id number]    Recent Visits  Date Type Provider Dept   04/16/21 Telephone [address] [facility],[address] [place] [address] recent visits within past 7 days and meeting all other requirements     Today's Visits  Date Type Provider Dept   04/19/21 Telemedicine Deloris RADHA Monk [facility]   Showing today's visits and meeting all other requirements     Future Appointments  No visits were found meeting these conditions  Showing future appointments within next 150 days and meeting all other requirements      This virtual check-in was done via [employer] and patient was informed that this is not a secure, HIPAA-compliant platform   She agrees to proceed  Patient agrees to participate in a virtual check in via telephone or video visit instead of presenting to the office to address urgent/immediate medical needs  Patient is aware this is a billable service  After connecting through [facility], the patient was identified by name and date of birth  Phyllis Fitzpatrick was informed that this was a telemedicine visit and that the exam was being conducted confidentially over secure lines  My office door was closed  No one else was in the room  Phyllis Fitzpatrick acknowledged consent and understanding of privacy and security of the telemedicine visit  I informed the patient that I have reviewed her record in Epic and presented the opportunity for her to ask any questions regarding the visit today  The patient agreed to participate  Subjective:   Phyllis Fitzpatrick is a 26 y o  female who is concerned about COVID-19  Patient's symptoms include nasal congestion and cough  Patient denies fever, chills, fatigue, malaise, rhinorrhea, sore throat, anosmia, loss of taste, shortness of breath, chest tightness, nausea, vomiting, diarrhea, myalgias and headaches       Date of symptom onset: 4/18/2021  Date of exposure: 4/13/2021    Exposure:   Contact with a person who is under investigation (PUI) for or who is positive for COVID-19 within the last 14 days?: Yes    Hospitalized recently for fever and/or lower respiratory symptoms?: No      Currently a healthcare worker that is involved in direct patient care?: No      Works in a special setting where the risk of COVID-19 transmission may be high? (this may include long-term care, correctional and group home facilities; homeless shelters; assisted-living facilities and group homes ): No      Resident in a special setting where the risk of COVID-19 transmission may be high? (this may include long-term care, correctional and group home facilities; homeless shelters; assisted-living facilities and group homes ): No      No results found for: Jayro Humphrey Alva Valerie  Past Medical History:   Diagnosis Date    Hypothyroid      Past Surgical History:   Procedure Laterality Date     SECTION      x 3    DILATION AND CURETTAGE OF UTERUS       Current Outpatient Medications   Medication Sig Dispense Refill    acetaminophen (TYLENOL) 500 mg tablet Take 1,000 mg by mouth every 6 (six) hours as needed for mild pain      levonorgestrel-ethinyl estradiol (SEASONALE) 0 15-0 03 MG per tablet Take 1 tablet by mouth daily 91 tablet 3    levothyroxine 50 mcg tablet Take 1 tablet (50 mcg total) by mouth daily 30 tablet 9    Multiple Vitamins-Minerals (MULTIVITAMIN ADULT PO) Take 1 tablet by mouth daily       No current facility-administered medications for this visit  No Known Allergies    Review of Systems   Constitutional: Negative for chills, fatigue and fever  HENT: Positive for congestion  Negative for rhinorrhea and sore throat  Respiratory: Positive for cough  Negative for chest tightness and shortness of breath  Gastrointestinal: Negative for diarrhea, nausea and vomiting  Musculoskeletal: Negative for myalgias  Neurological: Negative for headaches  Objective:    Vitals:    21 0813   Weight: 99 3 kg (219 lb)   Height: 5' 2" (1 575 m)       Physical Exam  Constitutional:       General: She is not in acute distress  Appearance: Normal appearance  She is not ill-appearing  Pulmonary:      Effort: Pulmonary effort is normal  No respiratory distress  Skin:     Coloration: Skin is not pale  Findings: No erythema  Neurological:      Mental Status: She is alert  Psychiatric:         Mood and Affect: Mood normal          Behavior: Behavior normal        VIRTUAL VISIT DISCLAIMER    Phyllis Fitzpatrick acknowledges that she has consented to an online visit or consultation   She understands that the online visit is based solely on information provided by her, and that, in the absence of a face-to-face physical evaluation by the physician, the diagnosis she receives is both limited and provisional in terms of accuracy and completeness  This is not intended to replace a full medical face-to-face evaluation by the physician  Rogelio Becerra understands and accepts these terms

## 2021-04-20 ENCOUNTER — TELEPHONE (OUTPATIENT)
Dept: FAMILY MEDICINE CLINIC | Facility: CLINIC | Age: 31
End: 2021-04-20

## 2021-04-20 LAB — SARS-COV-2 RNA RESP QL NAA+PROBE: NEGATIVE

## 2021-04-20 NOTE — TELEPHONE ENCOUNTER
----- Message from [address] [place] [address],[address] sent at 4/20/2021  2:59 PM EDT -----  Call pt   Negative covid test

## 2021-04-27 ENCOUNTER — IMMUNIZATIONS (OUTPATIENT)
Dept: FAMILY MEDICINE CLINIC | Facility: HOSPITAL | Age: 31
End: 2021-04-27

## 2021-04-27 DIAGNOSIS — Z23 ENCOUNTER FOR IMMUNIZATION: Primary | ICD-10-CM

## 2021-04-27 PROCEDURE — 0001A SARS-COV-2 / COVID-19 MRNA VACCINE (PFIZER-BIONTECH) 30 MCG: CPT

## 2021-04-27 PROCEDURE — 91300 SARS-COV-2 / COVID-19 MRNA VACCINE (PFIZER-BIONTECH) 30 MCG: CPT

## 2021-05-12 NOTE — TELEPHONE ENCOUNTER
Pt aware Elidel Counseling: Patient may experience a mild burning sensation during topical application. Elidel is not approved in children less than 2 years of age. There have been case reports of hematologic and skin malignancies in patients using topical calcineurin inhibitors although causality is questionable.

## 2021-05-21 ENCOUNTER — IMMUNIZATIONS (OUTPATIENT)
Dept: FAMILY MEDICINE CLINIC | Facility: HOSPITAL | Age: 31
End: 2021-05-21

## 2021-05-21 DIAGNOSIS — Z23 ENCOUNTER FOR IMMUNIZATION: Primary | ICD-10-CM

## 2021-05-21 PROCEDURE — 91300 SARS-COV-2 / COVID-19 MRNA VACCINE (PFIZER-BIONTECH) 30 MCG: CPT

## 2021-05-21 PROCEDURE — 0002A SARS-COV-2 / COVID-19 MRNA VACCINE (PFIZER-BIONTECH) 30 MCG: CPT

## 2022-03-19 DIAGNOSIS — E03.9 HYPOTHYROIDISM, UNSPECIFIED TYPE: ICD-10-CM

## 2022-03-19 DIAGNOSIS — Z13.6 SCREENING FOR CARDIOVASCULAR CONDITION: Primary | ICD-10-CM

## 2022-03-21 RX ORDER — LEVOTHYROXINE SODIUM 0.05 MG/1
50 TABLET ORAL DAILY
Qty: 30 TABLET | Refills: 0 | Status: SHIPPED | OUTPATIENT
Start: 2022-03-21 | End: 2022-04-08 | Stop reason: SDUPTHER

## 2022-03-27 LAB
ALBUMIN SERPL-MCNC: 4.3 G/DL (ref 3.8–4.8)
ALBUMIN/GLOB SERPL: 1.5 {RATIO} (ref 1.2–2.2)
ALP SERPL-CCNC: 79 IU/L (ref 44–121)
ALT SERPL-CCNC: 25 IU/L (ref 0–32)
AST SERPL-CCNC: 18 IU/L (ref 0–40)
BILIRUB SERPL-MCNC: 0.4 MG/DL (ref 0–1.2)
BUN SERPL-MCNC: 12 MG/DL (ref 6–20)
BUN/CREAT SERPL: 17 (ref 9–23)
CALCIUM SERPL-MCNC: 9.5 MG/DL (ref 8.7–10.2)
CHLORIDE SERPL-SCNC: 102 MMOL/L (ref 96–106)
CHOLEST SERPL-MCNC: 179 MG/DL (ref 100–199)
CO2 SERPL-SCNC: 19 MMOL/L (ref 20–29)
CREAT SERPL-MCNC: 0.72 MG/DL (ref 0.57–1)
EGFR: 113 ML/MIN/1.73
GLOBULIN SER-MCNC: 2.8 G/DL (ref 1.5–4.5)
GLUCOSE SERPL-MCNC: 83 MG/DL (ref 65–99)
HDLC SERPL-MCNC: 49 MG/DL
LDLC SERPL CALC-MCNC: 104 MG/DL (ref 0–99)
LDLC/HDLC SERPL: 2.1 RATIO (ref 0–3.2)
POTASSIUM SERPL-SCNC: 4.5 MMOL/L (ref 3.5–5.2)
PROT SERPL-MCNC: 7.1 G/DL (ref 6–8.5)
SL AMB VLDL CHOLESTEROL CALC: 26 MG/DL (ref 5–40)
SODIUM SERPL-SCNC: 139 MMOL/L (ref 134–144)
TRIGL SERPL-MCNC: 145 MG/DL (ref 0–149)
TSH SERPL DL<=0.005 MIU/L-ACNC: 2.57 UIU/ML (ref 0.45–4.5)

## 2022-04-08 ENCOUNTER — OFFICE VISIT (OUTPATIENT)
Dept: FAMILY MEDICINE CLINIC | Facility: CLINIC | Age: 32
End: 2022-04-08
Payer: COMMERCIAL

## 2022-04-08 VITALS
WEIGHT: 220 LBS | DIASTOLIC BLOOD PRESSURE: 82 MMHG | BODY MASS INDEX: 38.98 KG/M2 | HEART RATE: 77 BPM | HEIGHT: 63 IN | OXYGEN SATURATION: 98 % | SYSTOLIC BLOOD PRESSURE: 126 MMHG

## 2022-04-08 DIAGNOSIS — Z00.00 ANNUAL PHYSICAL EXAM: Primary | ICD-10-CM

## 2022-04-08 DIAGNOSIS — E03.9 HYPOTHYROIDISM, UNSPECIFIED TYPE: ICD-10-CM

## 2022-04-08 PROCEDURE — 99395 PREV VISIT EST AGE 18-39: CPT | Performed by: NURSE PRACTITIONER

## 2022-04-08 PROCEDURE — 3725F SCREEN DEPRESSION PERFORMED: CPT | Performed by: NURSE PRACTITIONER

## 2022-04-08 PROCEDURE — 3008F BODY MASS INDEX DOCD: CPT | Performed by: NURSE PRACTITIONER

## 2022-04-08 PROCEDURE — 1036F TOBACCO NON-USER: CPT | Performed by: NURSE PRACTITIONER

## 2022-04-08 RX ORDER — LEVOTHYROXINE SODIUM 0.05 MG/1
50 TABLET ORAL DAILY
Qty: 90 TABLET | Refills: 1 | Status: SHIPPED | OUTPATIENT
Start: 2022-04-08

## 2022-04-08 NOTE — PATIENT INSTRUCTIONS
Labs reviewed  TSH is normal  Continue Levothyroxine 50 mcg daily  Call with any questions/concerns  Wellness Visit for Adults   AMBULATORY CARE:   A wellness visit  is when you see your healthcare provider to get screened for health problems  Your healthcare provider will also give you advice on how to stay healthy  Write down your questions so you remember to ask them  Ask your healthcare provider how often you should have a wellness visit  What happens at a wellness visit:  Your healthcare provider will ask about your health, and your family history of health problems  This includes high blood pressure, heart disease, and cancer  He or she will ask if you have symptoms that concern you, if you smoke, and about your mood  You may also be asked about your intake of medicines, supplements, food, and alcohol  Any of the following may be done:  · Your weight  will be checked  Your height may also be checked so your body mass index (BMI) can be calculated  Your BMI shows if you are at a healthy weight  · Your blood pressure  and heart rate will be checked  Your temperature may also be checked  · Blood and urine tests  may be done  Blood tests may be done to check your cholesterol levels  Abnormal cholesterol levels increase your risk for heart disease and stroke  You may also need a blood or urine test to check for diabetes if you are at increased risk  Urine tests may be done to look for signs of an infection or kidney disease  · A physical exam  includes checking your heartbeat and lungs with a stethoscope  Your healthcare provider may also check your skin to look for sun damage  · Screening tests  may be recommended  A screening test is done to check for diseases that may not cause symptoms  The screening tests you may need depend on your age, gender, family history, and lifestyle habits  For example, colorectal screening may be recommended if you are 48years old or older      Screening tests you need if you are a woman:   · A Pap smear  is used to screen for cervical cancer  Pap smears are usually done every 3 to 5 years depending on your age  You may need them more often if you have had abnormal Pap smear test results in the past  Ask your healthcare provider how often you should have a Pap smear  · A mammogram  is an x-ray of your breasts to screen for breast cancer  Experts recommend mammograms every 2 years starting at age 48 years  You may need a mammogram at age 52 years or younger if you have an increased risk for breast cancer  Talk to your healthcare provider about when you should start having mammograms and how often you need them  Vaccines you may need:   · Get an influenza vaccine  every year  The influenza vaccine protects you from the flu  Several types of viruses cause the flu  The viruses change over time, so new vaccines are made each year  · Get a tetanus-diphtheria (Td) booster vaccine  every 10 years  This vaccine protects you against tetanus and diphtheria  Tetanus is a severe infection that may cause painful muscle spasms and lockjaw  Diphtheria is a severe bacterial infection that causes a thick covering in the back of your mouth and throat  · Get a human papillomavirus (HPV) vaccine  if you are female and aged 23 to 32 or male 23 to 24 and never received it  This vaccine protects you from HPV infection  HPV is the most common infection spread by sexual contact  HPV may also cause vaginal, penile, and anal cancers  · Get a pneumococcal vaccine  if you are aged 72 years or older  The pneumococcal vaccine is an injection given to protect you from pneumococcal disease  Pneumococcal disease is an infection caused by pneumococcal bacteria  The infection may cause pneumonia, meningitis, or an ear infection  · Get a shingles vaccine  if you are 60 or older, even if you have had shingles before   The shingles vaccine is an injection to protect you from the varicella-zoster virus  This is the same virus that causes chickenpox  Shingles is a painful rash that develops in people who had chickenpox or have been exposed to the virus  How to eat healthy:  My Plate is a model for planning healthy meals  It shows the types and amounts of foods that should go on your plate  Fruits and vegetables make up about half of your plate, and grains and protein make up the other half  A serving of dairy is included on the side of your plate  The amount of calories and serving sizes you need depends on your age, gender, weight, and height  Examples of healthy foods are listed below:  · Eat a variety of vegetables  such as dark green, red, and orange vegetables  You can also include canned vegetables low in sodium (salt) and frozen vegetables without added butter or sauces  · Eat a variety of fresh fruits , canned fruit in 100% juice, frozen fruit, and dried fruit  · Include whole grains  At least half of the grains you eat should be whole grains  Examples include whole-wheat bread, wheat pasta, brown rice, and whole-grain cereals such as oatmeal     · Eat a variety of protein foods such as seafood (fish and shellfish), lean meat, and poultry without skin (turkey and chicken)  Examples of lean meats include pork leg, shoulder, or tenderloin, and beef round, sirloin, tenderloin, and extra lean ground beef  Other protein foods include eggs and egg substitutes, beans, peas, soy products, nuts, and seeds  · Choose low-fat dairy products such as skim or 1% milk or low-fat yogurt, cheese, and cottage cheese  · Limit unhealthy fats  such as butter, hard margarine, and shortening  Exercise:  Exercise at least 30 minutes per day on most days of the week  Some examples of exercise include walking, biking, dancing, and swimming  You can also fit in more physical activity by taking the stairs instead of the elevator or parking farther away from stores   Include muscle strengthening activities 2 days each week  Regular exercise provides many health benefits  It helps you manage your weight, and decreases your risk for type 2 diabetes, heart disease, stroke, and high blood pressure  Exercise can also help improve your mood  Ask your healthcare provider about the best exercise plan for you  General health and safety guidelines:   · Do not smoke  Nicotine and other chemicals in cigarettes and cigars can cause lung damage  Ask your healthcare provider for information if you currently smoke and need help to quit  E-cigarettes or smokeless tobacco still contain nicotine  Talk to your healthcare provider before you use these products  · Limit alcohol  A drink of alcohol is 12 ounces of beer, 5 ounces of wine, or 1½ ounces of liquor  · Lose weight, if needed  Being overweight increases your risk of certain health conditions  These include heart disease, high blood pressure, type 2 diabetes, and certain types of cancer  · Protect your skin  Do not sunbathe or use tanning beds  Use sunscreen with a SPF 15 or higher  Apply sunscreen at least 15 minutes before you go outside  Reapply sunscreen every 2 hours  Wear protective clothing, hats, and sunglasses when you are outside  · Drive safely  Always wear your seatbelt  Make sure everyone in your car wears a seatbelt  A seatbelt can save your life if you are in an accident  Do not use your cell phone when you are driving  This could distract you and cause an accident  Pull over if you need to make a call or send a text message  · Practice safe sex  Use latex condoms if are sexually active and have more than one partner  Your healthcare provider may recommend screening tests for sexually transmitted infections (STIs)  · Wear helmets, lifejackets, and protective gear  Always wear a helmet when you ride a bike or motorcycle, go skiing, or play sports that could cause a head injury  Wear protective equipment when you play sports   Wear a kj when you are on a boat or doing water sports  © Copyright [employer] 2022 Information is for End User's use only and may not be sold, redistributed or otherwise used for commercial purposes  All illustrations and images included in CareNotes® are the copyrighted property of [employer]  or [employer]  The above information is an  only  It is not intended as medical advice for individual conditions or treatments  Talk to your doctor, nurse or pharmacist before following any medical regimen to see if it is safe and effective for you

## 2022-04-08 NOTE — PROGRESS NOTES
ADULT ANNUAL PHYSICAL  [address]     NAME: Sona Oshea  AGE: 33 y o  SEX: female  : 1990     DATE: 2022     Assessment and Plan:     Problem List Items Addressed This Visit        Endocrine    Hypothyroidism    Relevant Medications    levothyroxine 50 mcg tablet      Other Visit Diagnoses     Annual physical exam    -  Primary          Immunizations and preventive care screenings were discussed with patient today  Appropriate education was printed on patient's after visit summary  Counseling:  Alcohol/drug use: discussed moderation in alcohol intake, the recommendations for healthy alcohol use, and avoidance of illicit drug use  Dental Health: discussed importance of regular tooth brushing, flossing, and dental visits  Injury prevention: discussed safety/seat belts, safety helmets, smoke detectors, carbon dioxide detectors, and smoking near bedding or upholstery  Sexual health: discussed sexually transmitted diseases, partner selection, use of condoms, avoidance of unintended pregnancy, and contraceptive alternatives  · Exercise: the importance of regular exercise/physical activity was discussed  Recommend exercise 3-5 times per week for at least 30 minutes  BMI Counseling: Body mass index is 38 97 kg/m²  The BMI is above normal  Nutrition recommendations include decreasing portion sizes, encouraging healthy choices of fruits and vegetables, moderation in carbohydrate intake and increasing intake of lean protein  Exercise recommendations include exercising 3-5 times per week  Rationale for BMI follow-up plan is due to patient being overweight or obese  Depression Screening and Follow-up Plan: Patient was screened for depression during today's encounter  They screened negative with a PHQ-2 score of 0          Return in about 1 year (around 2023) for Annual physical      Chief Complaint:     Chief Complaint Patient presents with    Physical Exam     no concerns stated      History of Present Illness:     Adult Annual Physical   Patient here for a comprehensive physical exam  The patient reports no problems  Diet and Physical Activity  · Diet/Nutrition: well balanced diet  · Exercise: 3-4 times a week on average  Depression Screening  PHQ-2/9 Depression Screening    Little interest or pleasure in doing things: 0 - not at all  Feeling down, depressed, or hopeless: 0 - not at all  PHQ-2 Score: 0  PHQ-2 Interpretation: Negative depression screen       General Health  · Sleep: sleeps well  · Hearing: normal - bilateral   · Vision: no vision problems  · Dental: regular dental visits  /GYN Health  · Last menstrual period: 4/7/22  · Contraceptive method: oral contraceptives  · History of STDs?: no      Review of Systems:     Review of Systems   Constitutional: Negative for activity change, appetite change, chills, diaphoresis, fatigue and fever  HENT: Negative for congestion, dental problem, ear pain, facial swelling, hearing loss, sinus pressure, sinus pain, sore throat, trouble swallowing and voice change  Eyes: Negative for photophobia, pain, discharge, redness, itching and visual disturbance  Respiratory: Negative for cough, chest tightness, shortness of breath and wheezing  Cardiovascular: Negative for chest pain, palpitations and leg swelling  Gastrointestinal: Negative for abdominal distention, abdominal pain, anal bleeding, blood in stool, constipation, diarrhea, nausea, rectal pain and vomiting  Endocrine: Negative for polydipsia, polyphagia and polyuria  Genitourinary: Negative for decreased urine volume, difficulty urinating, dysuria, flank pain, frequency, hematuria, menstrual problem, pelvic pain, urgency, vaginal bleeding, vaginal discharge and vaginal pain     Musculoskeletal: Negative for arthralgias, back pain, gait problem, joint swelling, myalgias, neck pain and neck stiffness  Skin: Negative for color change, pallor, rash and wound  Neurological: Negative for dizziness, tremors, seizures, syncope, facial asymmetry, speech difficulty, weakness, light-headedness, numbness and headaches  Hematological: Negative for adenopathy  Does not bruise/bleed easily  Psychiatric/Behavioral: Negative for agitation, behavioral problems, confusion, decreased concentration, dysphoric mood, hallucinations, self-injury and sleep disturbance  The patient is not nervous/anxious         Past Medical History:     Past Medical History:   Diagnosis Date    Hypothyroid       Past Surgical History:     Past Surgical History:   Procedure Laterality Date     SECTION      x 3    DILATION AND CURETTAGE OF UTERUS        Social History:     Social History     Socioeconomic History    Marital status: /Civil Union     Spouse name: None    Number of children: 3    Years of education: None    Highest education level: None   Occupational History    None   Tobacco Use    Smoking status: Former Smoker    Smokeless tobacco: Never Used   Substance and Sexual Activity    Alcohol use: No    Drug use: No    Sexual activity: Yes     Birth control/protection: Pill   Other Topics Concern    None   Social History Narrative    Always uses seat belt    Caffeine use    No Buddhist beliefs     Social Determinants of Health     Financial Resource Strain: Not on file   Food Insecurity: Not on file   Transportation Needs: Not on file   Physical Activity: Not on file   Stress: Not on file   Social Connections: Not on file   Intimate Partner Violence: Not on file   Housing Stability: Not on file      Family History:     Family History   Problem Relation Age of Onset    Skin cancer Mother     Diabetes Family     No Known Problems Father       Current Medications:     Current Outpatient Medications   Medication Sig Dispense Refill    acetaminophen (TYLENOL) 500 mg tablet Take 1,000 mg by mouth every 6 (six) hours as needed for mild pain      levonorgestrel-ethinyl estradiol (SEASONALE) 0 15-0 03 MG per tablet Take 1 tablet by mouth daily 91 tablet 3    levothyroxine 50 mcg tablet Take 1 tablet (50 mcg total) by mouth daily 90 tablet 1    Multiple Vitamins-Minerals (MULTIVITAMIN ADULT PO) Take 1 tablet by mouth daily       No current facility-administered medications for this visit  Allergies:     No Known Allergies   Physical Exam:     /82   Pulse 77   Ht 5' 3" (1 6 m)   Wt 99 8 kg (220 lb)   SpO2 98%   BMI 38 97 kg/m²     Physical Exam  Vitals and nursing note reviewed  Constitutional:       General: She is not in acute distress  Appearance: Normal appearance  She is normal weight  She is not ill-appearing, toxic-appearing or diaphoretic  HENT:      Head: Normocephalic  Right Ear: Tympanic membrane, ear canal and external ear normal  There is no impacted cerumen  Left Ear: Tympanic membrane, ear canal and external ear normal  There is no impacted cerumen  Nose: Nose normal  No congestion or rhinorrhea  Mouth/Throat:      Mouth: Mucous membranes are moist       Pharynx: Oropharynx is clear  No oropharyngeal exudate or posterior oropharyngeal erythema  Eyes:      General: No scleral icterus  Right eye: No discharge  Left eye: No discharge  Extraocular Movements: Extraocular movements intact  Conjunctiva/sclera: Conjunctivae normal       Pupils: Pupils are equal, round, and reactive to light  Neck:      Vascular: No carotid bruit  Cardiovascular:      Rate and Rhythm: Normal rate and regular rhythm  Pulses: Normal pulses  Heart sounds: Normal heart sounds  No murmur heard  No friction rub  No gallop  Pulmonary:      Effort: Pulmonary effort is normal  No respiratory distress  Breath sounds: Normal breath sounds  No stridor  No wheezing, rhonchi or rales  Chest:      Chest wall: No tenderness     Abdominal: General: Abdomen is flat  Bowel sounds are normal  There is no distension  Palpations: Abdomen is soft  There is no mass  Tenderness: There is no abdominal tenderness  There is no right CVA tenderness, left CVA tenderness, guarding or rebound  Hernia: No hernia is present  Musculoskeletal:         General: No swelling, tenderness, deformity or signs of injury  Normal range of motion  Cervical back: Normal range of motion and neck supple  No rigidity or tenderness  No muscular tenderness  Right lower leg: No edema  Left lower leg: No edema  Lymphadenopathy:      Cervical: No cervical adenopathy  Skin:     General: Skin is warm  Capillary Refill: Capillary refill takes less than 2 seconds  Coloration: Skin is not jaundiced or pale  Findings: No bruising, erythema, lesion or rash  Neurological:      General: No focal deficit present  Mental Status: She is alert and oriented to person, place, and time  Cranial Nerves: No cranial nerve deficit  Sensory: No sensory deficit  Motor: No weakness  Coordination: Coordination normal       Gait: Gait normal       Deep Tendon Reflexes: Reflexes normal    Psychiatric:         Mood and Affect: Mood normal          Behavior: Behavior normal          Thought Content:  Thought content normal          Judgment: Judgment normal         PHQ-2/9 Depression Screening    Little interest or pleasure in doing things: 0 - not at all  Feeling down, depressed, or hopeless: 0 - not at all  PHQ-2 Score: 0  PHQ-2 Interpretation: Negative depression screen         Deloris Suarez, [id number] [facility]

## 2022-10-18 DIAGNOSIS — E03.9 HYPOTHYROIDISM, UNSPECIFIED TYPE: ICD-10-CM

## 2022-10-18 RX ORDER — LEVOTHYROXINE SODIUM 0.05 MG/1
50 TABLET ORAL DAILY
Qty: 90 TABLET | Refills: 0 | Status: SHIPPED | OUTPATIENT
Start: 2022-10-18

## 2022-11-16 ENCOUNTER — OFFICE VISIT (OUTPATIENT)
Dept: FAMILY MEDICINE CLINIC | Facility: CLINIC | Age: 32
End: 2022-11-16

## 2022-11-16 VITALS
BODY MASS INDEX: 38.9 KG/M2 | OXYGEN SATURATION: 99 % | SYSTOLIC BLOOD PRESSURE: 146 MMHG | WEIGHT: 219.6 LBS | HEART RATE: 86 BPM | DIASTOLIC BLOOD PRESSURE: 86 MMHG

## 2022-11-16 DIAGNOSIS — J02.9 PHARYNGITIS, UNSPECIFIED ETIOLOGY: Primary | ICD-10-CM

## 2022-11-16 RX ORDER — LEVONORGESTREL AND ETHINYL ESTRADIOL 150-30(84)
KIT ORAL
COMMUNITY
Start: 2022-10-10

## 2022-11-16 NOTE — PATIENT INSTRUCTIONS
Rapid strep is negative  Continue Ibuprofen/Tylenol for sore throat  Gargle with salt water  Call if no improvement

## 2022-11-16 NOTE — PROGRESS NOTES
[facility]        NAME: Brendan Clancy is a 27 y o  female  : 1990    MRN: [id number]  DATE: 2022  TIME: 9:37 AM    Assessment and Plan   Pharyngitis, unspecified etiology [J02 9]  1  Pharyngitis, unspecified etiology              Patient Instructions     Patient Instructions   Rapid strep is negative  Continue Ibuprofen/Tylenol for sore throat  Gargle with salt water  Call if no improvement  Chief Complaint     Chief Complaint   Patient presents with   • Sore Throat     ST since 22 w/ "white spots" on tonsils  Feels same as in past when she had strep         History of Present Illness       C/o sore throat started   White spots back of throat  No fever  No other symptoms present  Review of Systems   Review of Systems   Constitutional: Negative for activity change, chills, fatigue and fever  HENT: Positive for sore throat  Negative for congestion, ear pain, postnasal drip, rhinorrhea and sinus pressure  Eyes: Negative for pain, discharge and redness  Respiratory: Negative for cough and wheezing  Cardiovascular: Negative for chest pain  Gastrointestinal: Negative for constipation, diarrhea, nausea and vomiting  Musculoskeletal: Negative for myalgias  Skin: Negative for rash  Neurological: Negative for dizziness and headaches           Current Medications       Current Outpatient Medications:   •  acetaminophen (TYLENOL) 500 mg tablet, Take 1,000 mg by mouth every 6 (six) hours as needed for mild pain, Disp: , Rfl:   •  Daysee 0 15-0 03 &0 01 MG TABS, , Disp: , Rfl:   •  levothyroxine 50 mcg tablet, Take 1 tablet (50 mcg total) by mouth daily, Disp: 90 tablet, Rfl: 0  •  Multiple Vitamins-Minerals (MULTIVITAMIN ADULT PO), Take 1 tablet by mouth daily, Disp: , Rfl:     Current Allergies     Allergies as of 2022   • (No Known Allergies)            The following portions of the patient's history were reviewed and updated as appropriate: allergies, current medications, past family history, past medical history, past social history, past surgical history and problem list      Past Medical History:   Diagnosis Date   • Hypothyroid        Past Surgical History:   Procedure Laterality Date   •  SECTION      x 3   • DILATION AND CURETTAGE OF UTERUS         Family History   Problem Relation Age of Onset   • Skin cancer Mother    • Diabetes Family    • No Known Problems Father          Medications have been verified  Objective   Pulse 86   Wt 99 6 kg (219 lb 9 6 oz)   SpO2 99%   BMI 38 90 kg/m²        Physical Exam     Physical Exam  Vitals and nursing note reviewed  Constitutional:       General: She is not in acute distress  Appearance: She is well-developed and well-nourished  She is not ill-appearing, toxic-appearing or diaphoretic  HENT:      Head: Normocephalic and atraumatic  Right Ear: Tympanic membrane, ear canal and external ear normal  No drainage, swelling or tenderness  No middle ear effusion  Tympanic membrane is not erythematous  Left Ear: Tympanic membrane, ear canal and external ear normal  No drainage, swelling or tenderness  No middle ear effusion  Tympanic membrane is not erythematous  Nose: No rhinorrhea  Right Sinus: No maxillary sinus tenderness or frontal sinus tenderness  Left Sinus: No maxillary sinus tenderness or frontal sinus tenderness  Mouth/Throat:      Mouth: Mucous membranes are normal  Mucous membranes are moist  No oral lesions  Pharynx: Uvula midline  Posterior oropharyngeal erythema present  No pharyngeal swelling or oropharyngeal exudate  Tonsils: No tonsillar exudate or tonsillar abscesses  Eyes:      Extraocular Movements:      Right eye: Normal extraocular motion  Left eye: Normal extraocular motion  Cardiovascular:      Rate and Rhythm: Normal rate and regular rhythm  Heart sounds: Normal heart sounds   No murmur heard  No friction rub  No gallop  Pulmonary:      Effort: Pulmonary effort is normal  No respiratory distress  Breath sounds: Normal breath sounds  No wheezing, rhonchi or rales  Chest:      Chest wall: No tenderness  Musculoskeletal:      Cervical back: Normal range of motion and neck supple  Lymphadenopathy:      Head:      Right side of head: Tonsillar adenopathy present  Left side of head: Tonsillar adenopathy present  Cervical: No cervical adenopathy  Skin:     General: Skin is warm and dry  Coloration: Skin is not pale  Findings: No erythema or rash  Neurological:      Mental Status: She is alert and oriented to person, place, and time  Psychiatric:         Mood and Affect: Mood and affect and mood normal          Behavior: Behavior normal          Thought Content:  Thought content normal          Judgment: Judgment normal

## 2023-01-23 DIAGNOSIS — E03.9 HYPOTHYROIDISM, UNSPECIFIED TYPE: ICD-10-CM

## 2023-01-23 RX ORDER — LEVOTHYROXINE SODIUM 0.05 MG/1
50 TABLET ORAL DAILY
Qty: 90 TABLET | Refills: 0 | Status: SHIPPED | OUTPATIENT
Start: 2023-01-23

## 2023-03-31 ENCOUNTER — OFFICE VISIT (OUTPATIENT)
Dept: FAMILY MEDICINE CLINIC | Facility: CLINIC | Age: 33
End: 2023-03-31

## 2023-03-31 VITALS
WEIGHT: 217 LBS | BODY MASS INDEX: 38.44 KG/M2 | SYSTOLIC BLOOD PRESSURE: 144 MMHG | DIASTOLIC BLOOD PRESSURE: 88 MMHG | OXYGEN SATURATION: 98 % | TEMPERATURE: 98.7 F | HEART RATE: 91 BPM

## 2023-03-31 DIAGNOSIS — H92.01 OTALGIA OF RIGHT EAR: ICD-10-CM

## 2023-03-31 DIAGNOSIS — J30.9 ALLERGIC RHINITIS, UNSPECIFIED SEASONALITY, UNSPECIFIED TRIGGER: Primary | ICD-10-CM

## 2023-03-31 NOTE — PATIENT INSTRUCTIONS
Discussed viral vs allergic vs bacterial infection  Flonase and OTC allergy medication as directed daily  Tylenol/Ibuprofen as directed for ear pain  Call or return for problems/concerns    Schedule annual physical which is due 4/8/23

## 2023-03-31 NOTE — PROGRESS NOTES
[facility]        NAME: Torri Looney is a 27 y o  female  : 1990    MRN: [id number]  DATE: 2023  TIME: 2:01 PM    Assessment and Plan   Allergic rhinitis, unspecified seasonality, unspecified trigger [J30 9]  1  Allergic rhinitis, unspecified seasonality, unspecified trigger        2  Otalgia of right ear              Patient Instructions     Patient Instructions   Discussed viral vs allergic vs bacterial infection  Flonase and OTC allergy medication as directed daily  Tylenol/Ibuprofen as directed for ear pain  Call or return for problems/concerns    Schedule annual physical which is due 23          Chief Complaint     Chief Complaint   Patient presents with   • Earache     Rt side         History of Present Illness       C/o right earache x 1 week  Feels like pressure/under water  Taking cold medication which helped initially but symptoms came back  No fever  Has mild cough  Earache   There is pain in the right ear  This is a new problem  The current episode started in the past 7 days  The problem occurs constantly  The problem has been gradually worsening  There has been no fever  The fever has been present for less than 1 day  The pain is at a severity of 5/10  Associated symptoms include coughing  Pertinent negatives include no abdominal pain, diarrhea, ear discharge, headaches, hearing loss, neck pain, rash, rhinorrhea, sore throat or vomiting  Review of Systems   Review of Systems   Constitutional: Negative for activity change and fever  HENT: Positive for ear pain  Negative for congestion, ear discharge, facial swelling, hearing loss, postnasal drip, rhinorrhea, sinus pressure, sinus pain and sore throat  Respiratory: Positive for cough  Negative for chest tightness and shortness of breath  Gastrointestinal: Negative for abdominal pain, diarrhea and vomiting  Musculoskeletal: Negative for neck pain  Skin: Negative for rash  Neurological: Negative for dizziness and headaches  Current Medications       Current Outpatient Medications:   •  acetaminophen (TYLENOL) 500 mg tablet, Take 1,000 mg by mouth every 6 (six) hours as needed for mild pain, Disp: , Rfl:   •  Daysee 0 15-0 03 &0 01 MG TABS, , Disp: , Rfl:   •  levothyroxine 50 mcg tablet, Take 1 tablet (50 mcg total) by mouth daily, Disp: 90 tablet, Rfl: 0  •  Multiple Vitamins-Minerals (MULTIVITAMIN ADULT PO), Take 1 tablet by mouth daily, Disp: , Rfl:     Current Allergies     Allergies as of 2023   • (No Known Allergies)            The following portions of the patient's history were reviewed and updated as appropriate: allergies, current medications, past family history, past medical history, past social history, past surgical history and problem list      Past Medical History:   Diagnosis Date   • Hypothyroid        Past Surgical History:   Procedure Laterality Date   •  SECTION      x 3   • DILATION AND CURETTAGE OF UTERUS         Family History   Problem Relation Age of Onset   • Skin cancer Mother    • Diabetes Family    • No Known Problems Father          Medications have been verified  Objective   /88   Pulse 91   Temp 98 7 °F (37 1 °C) (Tympanic)   Wt 98 4 kg (217 lb)   SpO2 98%   BMI 38 44 kg/m²        Physical Exam     Physical Exam  Vitals and nursing note reviewed  Constitutional:       General: She is not in acute distress  Appearance: Normal appearance  She is obese  She is not ill-appearing or diaphoretic  HENT:      Head: Normocephalic  Right Ear: External ear normal  There is no impacted cerumen  Left Ear: Tympanic membrane, ear canal and external ear normal  There is no impacted cerumen  Ears:      Comments: Mild erythema right ear, fluid present in TM  No infection noted  Nose: No congestion or rhinorrhea        Mouth/Throat:      Mouth: Mucous membranes are moist       Pharynx: Oropharynx is clear  No oropharyngeal exudate or posterior oropharyngeal erythema  Eyes:      General:         Right eye: No discharge  Left eye: No discharge  Extraocular Movements: Extraocular movements intact  Conjunctiva/sclera: Conjunctivae normal    Cardiovascular:      Rate and Rhythm: Normal rate and regular rhythm  Heart sounds: Normal heart sounds  No murmur heard  No friction rub  No gallop  Pulmonary:      Effort: Pulmonary effort is normal  No respiratory distress  Breath sounds: Normal breath sounds  No wheezing or rhonchi  Musculoskeletal:         General: Normal range of motion  Cervical back: Normal range of motion and neck supple  No rigidity or tenderness  Lymphadenopathy:      Cervical: No cervical adenopathy  Skin:     General: Skin is warm and dry  Coloration: Skin is not pale  Findings: No erythema  Neurological:      Mental Status: She is alert and oriented to person, place, and time     Psychiatric:         Mood and Affect: Mood normal          Behavior: Behavior normal

## 2023-04-24 ENCOUNTER — TELEPHONE (OUTPATIENT)
Dept: FAMILY MEDICINE CLINIC | Facility: CLINIC | Age: 33
End: 2023-04-24

## 2023-04-24 NOTE — TELEPHONE ENCOUNTER
----- Message from [address] [place] [address],[address] sent at 4/24/2023  9:32 AM EDT -----  Cholesterol is elevated-lifestyle modifications-diet/exercise to lower  Decrease fats, sugars, carbs in diet  Repeat routine fasting labs in 1 year

## 2023-05-12 ENCOUNTER — OFFICE VISIT (OUTPATIENT)
Dept: FAMILY MEDICINE CLINIC | Facility: CLINIC | Age: 33
End: 2023-05-12

## 2023-05-12 VITALS
WEIGHT: 211 LBS | BODY MASS INDEX: 38.59 KG/M2 | TEMPERATURE: 99.1 F | OXYGEN SATURATION: 98 % | DIASTOLIC BLOOD PRESSURE: 85 MMHG | HEART RATE: 89 BPM | SYSTOLIC BLOOD PRESSURE: 120 MMHG

## 2023-05-12 DIAGNOSIS — I10 ESSENTIAL HYPERTENSION: Primary | ICD-10-CM

## 2023-05-12 RX ORDER — LISINOPRIL 10 MG/1
10 TABLET ORAL DAILY
Qty: 90 TABLET | Refills: 1 | Status: SHIPPED | OUTPATIENT
Start: 2023-05-12

## 2023-05-12 NOTE — PROGRESS NOTES
[facility]        NAME: Dutch Walls is a 27 y o  female  : 1990    MRN: [id number]  DATE: May 12, 2023  TIME: 1:48 PM    Assessment and Plan   Essential hypertension [I10]  1  Essential hypertension  lisinopril (ZESTRIL) 10 mg tablet            Patient Instructions     Patient Instructions   Continue Lisinopril 10 mg daily  F/up 6 months or sooner if needed for med check  Call with problems/concerns          Chief Complaint     Chief Complaint   Patient presents with   • Follow-up   • Hypertension         History of Present Illness       Here for bp check  Started on Lisinopril a few weeks ago  Patient states she is doing well  Feels good  BP at home range from 113-130s/70s-80s  Hypertension  This is a recurrent problem  The current episode started 1 to 4 weeks ago  The problem has been gradually improving since onset  The problem is resistant  Pertinent negatives include no anxiety, blurred vision, chest pain, headaches, malaise/fatigue, neck pain, orthopnea, palpitations, peripheral edema, PND, shortness of breath or sweats  Agents associated with hypertension include thyroid hormones  Risk factors for coronary artery disease include obesity  There are no compliance problems  Review of Systems   Review of Systems   Constitutional: Negative for activity change, diaphoresis, fever and malaise/fatigue  Eyes: Negative for blurred vision and visual disturbance  Respiratory: Negative for chest tightness, shortness of breath and wheezing  Cardiovascular: Negative for chest pain, palpitations, orthopnea, leg swelling and PND  Gastrointestinal: Negative for abdominal pain and nausea  Musculoskeletal: Negative for neck pain  Skin: Negative for color change and pallor  Neurological: Negative for dizziness, weakness and headaches           Current Medications       Current Outpatient Medications:   •  acetaminophen (TYLENOL) 500 mg tablet, Take 1,000 mg by mouth every 6 (six) hours as needed for mild pain, Disp: , Rfl:   •  levothyroxine 50 mcg tablet, Take 1 tablet (50 mcg total) by mouth daily, Disp: 90 tablet, Rfl: 0  •  lisinopril (ZESTRIL) 10 mg tablet, Take 1 tablet (10 mg total) by mouth daily, Disp: 90 tablet, Rfl: 1  •  Multiple Vitamins-Minerals (MULTIVITAMIN ADULT PO), Take 1 tablet by mouth daily, Disp: , Rfl:     Current Allergies     Allergies as of 2023   • (No Known Allergies)            The following portions of the patient's history were reviewed and updated as appropriate: allergies, current medications, past family history, past medical history, past social history, past surgical history and problem list      Past Medical History:   Diagnosis Date   • Allergic    • Disease of thyroid gland    • Hypertension    • Hypothyroid    • Obesity        Past Surgical History:   Procedure Laterality Date   •  SECTION      x 3   • DILATION AND CURETTAGE OF UTERUS         Family History   Problem Relation Age of Onset   • Skin cancer Mother    • Cancer Mother    • Diabetes Family    • No Known Problems Father          Medications have been verified  Objective   /85 (BP Location: Left arm, Patient Position: Sitting, Cuff Size: Standard)   Pulse 89   Temp 99 1 °F (37 3 °C) (Tympanic)   Wt 95 7 kg (211 lb)   SpO2 98%   BMI 38 59 kg/m²        Physical Exam     Physical Exam  Vitals and nursing note reviewed  Constitutional:       General: She is not in acute distress  Appearance: Normal appearance  HENT:      Head: Normocephalic  Eyes:      Extraocular Movements: Extraocular movements intact  Cardiovascular:      Rate and Rhythm: Normal rate and regular rhythm  Heart sounds: Normal heart sounds  No murmur heard  No friction rub  No gallop  Pulmonary:      Effort: Pulmonary effort is normal  No respiratory distress  Breath sounds: Normal breath sounds  No wheezing or rhonchi     Musculoskeletal:      Cervical back: Normal range of motion  Skin:     General: Skin is warm and dry  Neurological:      General: No focal deficit present  Mental Status: She is alert and oriented to person, place, and time     Psychiatric:         Mood and Affect: Mood normal          Behavior: Behavior normal

## 2023-05-12 NOTE — PATIENT INSTRUCTIONS
Continue Lisinopril 10 mg daily  F/up 6 months or sooner if needed for med check  Call with problems/concerns

## 2023-08-05 DIAGNOSIS — I10 ESSENTIAL HYPERTENSION: ICD-10-CM

## 2023-08-05 DIAGNOSIS — E03.9 HYPOTHYROIDISM, UNSPECIFIED TYPE: ICD-10-CM

## 2023-08-07 RX ORDER — LISINOPRIL 10 MG/1
10 TABLET ORAL DAILY
Qty: 90 TABLET | Refills: 0 | Status: SHIPPED | OUTPATIENT
Start: 2023-08-07

## 2023-08-07 RX ORDER — LEVOTHYROXINE SODIUM 0.05 MG/1
50 TABLET ORAL DAILY
Qty: 90 TABLET | Refills: 0 | Status: SHIPPED | OUTPATIENT
Start: 2023-08-07

## 2023-11-04 DIAGNOSIS — E03.9 HYPOTHYROIDISM, UNSPECIFIED TYPE: ICD-10-CM

## 2023-11-04 DIAGNOSIS — I10 ESSENTIAL HYPERTENSION: ICD-10-CM

## 2023-11-06 RX ORDER — LISINOPRIL 10 MG/1
10 TABLET ORAL DAILY
Qty: 90 TABLET | Refills: 0 | Status: SHIPPED | OUTPATIENT
Start: 2023-11-06

## 2023-11-06 RX ORDER — LEVOTHYROXINE SODIUM 0.05 MG/1
50 TABLET ORAL DAILY
Qty: 90 TABLET | Refills: 0 | Status: SHIPPED | OUTPATIENT
Start: 2023-11-06

## 2024-01-03 ENCOUNTER — APPOINTMENT (EMERGENCY)
Dept: RADIOLOGY | Facility: HOSPITAL | Age: 34
DRG: 918 | End: 2024-01-03
Payer: COMMERCIAL

## 2024-01-03 ENCOUNTER — HOSPITAL ENCOUNTER (INPATIENT)
Facility: HOSPITAL | Age: 34
LOS: 1 days | DRG: 918 | End: 2024-01-04
Attending: EMERGENCY MEDICINE | Admitting: INTERNAL MEDICINE
Payer: COMMERCIAL

## 2024-01-03 DIAGNOSIS — Z00.8 MEDICAL CLEARANCE FOR PSYCHIATRIC ADMISSION: ICD-10-CM

## 2024-01-03 DIAGNOSIS — T50.902A INTENTIONAL DRUG OVERDOSE, INITIAL ENCOUNTER (HCC): ICD-10-CM

## 2024-01-03 DIAGNOSIS — E03.9 HYPOTHYROIDISM: ICD-10-CM

## 2024-01-03 DIAGNOSIS — E87.6 HYPOKALEMIA: ICD-10-CM

## 2024-01-03 DIAGNOSIS — T14.91XA SUICIDE ATTEMPT (HCC): Primary | ICD-10-CM

## 2024-01-03 PROBLEM — F32.A DEPRESSION: Status: ACTIVE | Noted: 2024-01-03

## 2024-01-03 PROBLEM — R41.82 AMS (ALTERED MENTAL STATUS): Status: ACTIVE | Noted: 2024-01-03

## 2024-01-03 PROBLEM — D72.829 LEUKOCYTOSIS: Status: ACTIVE | Noted: 2024-01-03

## 2024-01-03 LAB
ALBUMIN SERPL BCP-MCNC: 4.4 G/DL (ref 3.5–5)
ALP SERPL-CCNC: 91 U/L (ref 34–104)
ALT SERPL W P-5'-P-CCNC: 12 U/L (ref 7–52)
AMPHETAMINES SERPL QL SCN: NEGATIVE
ANION GAP SERPL CALCULATED.3IONS-SCNC: 7 MMOL/L
APAP SERPL-MCNC: <2 UG/ML (ref 10–20)
APTT PPP: 29 SECONDS (ref 23–37)
AST SERPL W P-5'-P-CCNC: 13 U/L (ref 13–39)
ATRIAL RATE: 68 BPM
ATRIAL RATE: 70 BPM
ATRIAL RATE: 73 BPM
ATRIAL RATE: 73 BPM
ATRIAL RATE: 77 BPM
BARBITURATES UR QL: NEGATIVE
BASE EXCESS BLDA CALC-SCNC: -1 MMOL/L (ref -2–3)
BASOPHILS # BLD AUTO: 0.02 THOUSANDS/ÂΜL (ref 0–0.1)
BASOPHILS NFR BLD AUTO: 0 % (ref 0–1)
BENZODIAZ UR QL: NEGATIVE
BILIRUB DIRECT SERPL-MCNC: 0.04 MG/DL (ref 0–0.2)
BILIRUB SERPL-MCNC: 0.36 MG/DL (ref 0.2–1)
BILIRUB UR QL STRIP: NEGATIVE
BUN SERPL-MCNC: 13 MG/DL (ref 5–25)
CA-I BLD-SCNC: 1.26 MMOL/L (ref 1.12–1.32)
CALCIUM SERPL-MCNC: 9.6 MG/DL (ref 8.4–10.2)
CHLORIDE SERPL-SCNC: 105 MMOL/L (ref 96–108)
CLARITY UR: CLEAR
CO2 SERPL-SCNC: 25 MMOL/L (ref 21–32)
COCAINE UR QL: NEGATIVE
COLOR UR: COLORLESS
CREAT SERPL-MCNC: 0.7 MG/DL (ref 0.6–1.3)
EOSINOPHIL # BLD AUTO: 0.17 THOUSAND/ÂΜL (ref 0–0.61)
EOSINOPHIL NFR BLD AUTO: 1 % (ref 0–6)
ERYTHROCYTE [DISTWIDTH] IN BLOOD BY AUTOMATED COUNT: 12.3 % (ref 11.6–15.1)
ETHANOL SERPL-MCNC: <10 MG/DL
EXT PREGNANCY TEST URINE: NEGATIVE
EXT. CONTROL: NORMAL
FLUAV RNA RESP QL NAA+PROBE: NEGATIVE
FLUBV RNA RESP QL NAA+PROBE: NEGATIVE
GFR SERPL CREATININE-BSD FRML MDRD: 112 ML/MIN/1.73SQ M
GLUCOSE SERPL-MCNC: 101 MG/DL (ref 65–140)
GLUCOSE SERPL-MCNC: 89 MG/DL (ref 65–140)
GLUCOSE SERPL-MCNC: 98 MG/DL (ref 65–140)
GLUCOSE UR STRIP-MCNC: NEGATIVE MG/DL
HCO3 BLDA-SCNC: 24 MMOL/L (ref 24–30)
HCT VFR BLD AUTO: 41.1 % (ref 34.8–46.1)
HCT VFR BLD CALC: 42 % (ref 34.8–46.1)
HGB BLD-MCNC: 13.8 G/DL (ref 11.5–15.4)
HGB BLDA-MCNC: 14.3 G/DL (ref 11.5–15.4)
HGB UR QL STRIP.AUTO: NEGATIVE
IMM GRANULOCYTES # BLD AUTO: 0.08 THOUSAND/UL (ref 0–0.2)
IMM GRANULOCYTES NFR BLD AUTO: 1 % (ref 0–2)
INR PPP: 0.89 (ref 0.84–1.19)
KETONES UR STRIP-MCNC: NEGATIVE MG/DL
LEUKOCYTE ESTERASE UR QL STRIP: NEGATIVE
LYMPHOCYTES # BLD AUTO: 2.59 THOUSANDS/ÂΜL (ref 0.6–4.47)
LYMPHOCYTES NFR BLD AUTO: 18 % (ref 14–44)
MCH RBC QN AUTO: 30.8 PG (ref 26.8–34.3)
MCHC RBC AUTO-ENTMCNC: 33.6 G/DL (ref 31.4–37.4)
MCV RBC AUTO: 92 FL (ref 82–98)
MONOCYTES # BLD AUTO: 0.93 THOUSAND/ÂΜL (ref 0.17–1.22)
MONOCYTES NFR BLD AUTO: 6 % (ref 4–12)
NEUTROPHILS # BLD AUTO: 10.68 THOUSANDS/ÂΜL (ref 1.85–7.62)
NEUTS SEG NFR BLD AUTO: 74 % (ref 43–75)
NITRITE UR QL STRIP: NEGATIVE
NRBC BLD AUTO-RTO: 0 /100 WBCS
OPIATES UR QL SCN: NEGATIVE
OXYCODONE+OXYMORPHONE UR QL SCN: NEGATIVE
P AXIS: 23 DEGREES
P AXIS: 32 DEGREES
P AXIS: 32 DEGREES
P AXIS: 34 DEGREES
P AXIS: 35 DEGREES
PCO2 BLD: 25 MMOL/L (ref 21–32)
PCO2 BLD: 42.4 MM HG (ref 42–50)
PCP UR QL: NEGATIVE
PH BLD: 7.36 [PH] (ref 7.3–7.4)
PH UR STRIP.AUTO: 5.5 [PH]
PLATELET # BLD AUTO: 397 THOUSANDS/UL (ref 149–390)
PMV BLD AUTO: 10.8 FL (ref 8.9–12.7)
PO2 BLD: 25 MM HG (ref 35–45)
POTASSIUM BLD-SCNC: 3.5 MMOL/L (ref 3.5–5.3)
POTASSIUM SERPL-SCNC: 3.6 MMOL/L (ref 3.5–5.3)
PR INTERVAL: 156 MS
PR INTERVAL: 158 MS
PR INTERVAL: 160 MS
PR INTERVAL: 160 MS
PR INTERVAL: 162 MS
PROT SERPL-MCNC: 7.6 G/DL (ref 6.4–8.4)
PROT UR STRIP-MCNC: NEGATIVE MG/DL
PROTHROMBIN TIME: 12.4 SECONDS (ref 11.6–14.5)
QRS AXIS: 52 DEGREES
QRS AXIS: 56 DEGREES
QRS AXIS: 58 DEGREES
QRSD INTERVAL: 80 MS
QRSD INTERVAL: 82 MS
QRSD INTERVAL: 84 MS
QT INTERVAL: 378 MS
QT INTERVAL: 380 MS
QT INTERVAL: 392 MS
QT INTERVAL: 394 MS
QT INTERVAL: 416 MS
QTC INTERVAL: 418 MS
QTC INTERVAL: 423 MS
QTC INTERVAL: 427 MS
QTC INTERVAL: 434 MS
QTC INTERVAL: 442 MS
RBC # BLD AUTO: 4.48 MILLION/UL (ref 3.81–5.12)
RSV RNA RESP QL NAA+PROBE: NEGATIVE
SALICYLATES SERPL-MCNC: <5 MG/DL (ref 3–20)
SAO2 % BLD FROM PO2: 43 % (ref 60–85)
SARS-COV-2 RNA RESP QL NAA+PROBE: NEGATIVE
SODIUM BLD-SCNC: 139 MMOL/L (ref 136–145)
SODIUM SERPL-SCNC: 137 MMOL/L (ref 135–147)
SP GR UR STRIP.AUTO: <1.005 (ref 1–1.03)
SPECIMEN SOURCE: ABNORMAL
T WAVE AXIS: 45 DEGREES
T WAVE AXIS: 47 DEGREES
T WAVE AXIS: 51 DEGREES
T WAVE AXIS: 52 DEGREES
T WAVE AXIS: 60 DEGREES
THC UR QL: NEGATIVE
TSH SERPL DL<=0.05 MIU/L-ACNC: 1.76 UIU/ML (ref 0.45–4.5)
UROBILINOGEN UR STRIP-ACNC: <2 MG/DL
VENTRICULAR RATE: 68 BPM
VENTRICULAR RATE: 70 BPM
VENTRICULAR RATE: 73 BPM
VENTRICULAR RATE: 73 BPM
VENTRICULAR RATE: 77 BPM
WBC # BLD AUTO: 14.47 THOUSAND/UL (ref 4.31–10.16)

## 2024-01-03 PROCEDURE — 99285 EMERGENCY DEPT VISIT HI MDM: CPT

## 2024-01-03 PROCEDURE — 81025 URINE PREGNANCY TEST: CPT | Performed by: EMERGENCY MEDICINE

## 2024-01-03 PROCEDURE — 99291 CRITICAL CARE FIRST HOUR: CPT | Performed by: EMERGENCY MEDICINE

## 2024-01-03 PROCEDURE — 81003 URINALYSIS AUTO W/O SCOPE: CPT | Performed by: EMERGENCY MEDICINE

## 2024-01-03 PROCEDURE — 80048 BASIC METABOLIC PNL TOTAL CA: CPT | Performed by: EMERGENCY MEDICINE

## 2024-01-03 PROCEDURE — 0241U HB NFCT DS VIR RESP RNA 4 TRGT: CPT | Performed by: EMERGENCY MEDICINE

## 2024-01-03 PROCEDURE — 96365 THER/PROPH/DIAG IV INF INIT: CPT

## 2024-01-03 PROCEDURE — 80143 DRUG ASSAY ACETAMINOPHEN: CPT | Performed by: EMERGENCY MEDICINE

## 2024-01-03 PROCEDURE — 82077 ASSAY SPEC XCP UR&BREATH IA: CPT | Performed by: EMERGENCY MEDICINE

## 2024-01-03 PROCEDURE — 93005 ELECTROCARDIOGRAM TRACING: CPT

## 2024-01-03 PROCEDURE — 85610 PROTHROMBIN TIME: CPT | Performed by: EMERGENCY MEDICINE

## 2024-01-03 PROCEDURE — 85025 COMPLETE CBC W/AUTO DIFF WBC: CPT | Performed by: EMERGENCY MEDICINE

## 2024-01-03 PROCEDURE — 82948 REAGENT STRIP/BLOOD GLUCOSE: CPT

## 2024-01-03 PROCEDURE — 71045 X-RAY EXAM CHEST 1 VIEW: CPT

## 2024-01-03 PROCEDURE — 82330 ASSAY OF CALCIUM: CPT

## 2024-01-03 PROCEDURE — 80076 HEPATIC FUNCTION PANEL: CPT | Performed by: EMERGENCY MEDICINE

## 2024-01-03 PROCEDURE — 82947 ASSAY GLUCOSE BLOOD QUANT: CPT

## 2024-01-03 PROCEDURE — 80179 DRUG ASSAY SALICYLATE: CPT | Performed by: EMERGENCY MEDICINE

## 2024-01-03 PROCEDURE — 84443 ASSAY THYROID STIM HORMONE: CPT | Performed by: EMERGENCY MEDICINE

## 2024-01-03 PROCEDURE — 80307 DRUG TEST PRSMV CHEM ANLYZR: CPT | Performed by: EMERGENCY MEDICINE

## 2024-01-03 PROCEDURE — 84295 ASSAY OF SERUM SODIUM: CPT

## 2024-01-03 PROCEDURE — 84132 ASSAY OF SERUM POTASSIUM: CPT

## 2024-01-03 PROCEDURE — 36415 COLL VENOUS BLD VENIPUNCTURE: CPT | Performed by: EMERGENCY MEDICINE

## 2024-01-03 PROCEDURE — 99223 1ST HOSP IP/OBS HIGH 75: CPT | Performed by: INTERNAL MEDICINE

## 2024-01-03 PROCEDURE — 85014 HEMATOCRIT: CPT

## 2024-01-03 PROCEDURE — 82803 BLOOD GASES ANY COMBINATION: CPT

## 2024-01-03 PROCEDURE — 85730 THROMBOPLASTIN TIME PARTIAL: CPT | Performed by: EMERGENCY MEDICINE

## 2024-01-03 RX ORDER — PROPRANOLOL HYDROCHLORIDE 10 MG/1
10 TABLET ORAL 3 TIMES DAILY
Status: ON HOLD | COMMUNITY

## 2024-01-03 RX ORDER — CHLORHEXIDINE GLUCONATE ORAL RINSE 1.2 MG/ML
15 SOLUTION DENTAL EVERY 12 HOURS SCHEDULED
Status: DISCONTINUED | OUTPATIENT
Start: 2024-01-03 | End: 2024-01-04 | Stop reason: HOSPADM

## 2024-01-03 RX ORDER — DIPHENHYDRAMINE HCL 25 MG
25 CAPSULE ORAL EVERY 6 HOURS PRN
Status: ON HOLD | COMMUNITY

## 2024-01-03 RX ORDER — POTASSIUM CHLORIDE 14.9 MG/ML
20 INJECTION INTRAVENOUS ONCE
Status: COMPLETED | OUTPATIENT
Start: 2024-01-03 | End: 2024-01-03

## 2024-01-03 RX ORDER — SODIUM CHLORIDE, SODIUM GLUCONATE, SODIUM ACETATE, POTASSIUM CHLORIDE, MAGNESIUM CHLORIDE, SODIUM PHOSPHATE, DIBASIC, AND POTASSIUM PHOSPHATE .53; .5; .37; .037; .03; .012; .00082 G/100ML; G/100ML; G/100ML; G/100ML; G/100ML; G/100ML; G/100ML
100 INJECTION, SOLUTION INTRAVENOUS CONTINUOUS
Status: DISCONTINUED | OUTPATIENT
Start: 2024-01-03 | End: 2024-01-04

## 2024-01-03 RX ORDER — CITALOPRAM HYDROBROMIDE 10 MG/1
5 TABLET ORAL DAILY
Status: ON HOLD | COMMUNITY

## 2024-01-03 RX ADMIN — POTASSIUM CHLORIDE 20 MEQ: 14.9 INJECTION, SOLUTION INTRAVENOUS at 16:14

## 2024-01-03 RX ADMIN — CHLORHEXIDINE GLUCONATE 0.12% ORAL RINSE 15 ML: 1.2 LIQUID ORAL at 20:49

## 2024-01-03 RX ADMIN — SODIUM CHLORIDE, SODIUM GLUCONATE, SODIUM ACETATE, POTASSIUM CHLORIDE, MAGNESIUM CHLORIDE, SODIUM PHOSPHATE, DIBASIC, AND POTASSIUM PHOSPHATE 100 ML/HR: .53; .5; .37; .037; .03; .012; .00082 INJECTION, SOLUTION INTRAVENOUS at 16:58

## 2024-01-03 RX ADMIN — SODIUM CHLORIDE, SODIUM LACTATE, POTASSIUM CHLORIDE, AND CALCIUM CHLORIDE 2000 ML: .6; .31; .03; .02 INJECTION, SOLUTION INTRAVENOUS at 11:29

## 2024-01-03 NOTE — ED PROVIDER NOTES
History  Chief Complaint   Patient presents with    Suicide Attempt     Pt at [facility], intentional overdose.      33-year-old female with history of hypothyroidism, hypertension and obesity was brought by ambulance from a van after suicide attempts by intentional drug ingestion.  She ingested propranolol 20 mg #30; diphenhydramine 25 mg # up to 24 and possibly lisinopril 10 mg quantity unknown.  She is sleepy.  Able answer some questions and follow commands but has slurred speech.  Closes her eyes when not being asked.  Pulse ox stable at this time with elevated blood pressure.      History provided by:  Patient, medical records and EMS personnel  History limited by:  Mental status change   used: No    Suicide Attempt      Prior to Admission Medications   Prescriptions Last Dose Informant Patient Reported? Taking?   Multiple Vitamins-Minerals (MULTIVITAMIN ADULT PO)  Self Yes No   Sig: Take 1 tablet by mouth daily   acetaminophen (TYLENOL) 500 mg tablet   Yes No   Sig: Take 1,000 mg by mouth every 6 (six) hours as needed for mild pain   levothyroxine 50 mcg tablet   No No   Sig: Take 1 tablet (50 mcg total) by mouth daily   lisinopril (ZESTRIL) 10 mg tablet   No No   Sig: Take 1 tablet (10 mg total) by mouth daily      Facility-Administered Medications: None       Past Medical History:   Diagnosis Date    Allergic     Disease of thyroid gland     Hypertension     Hypothyroid     Obesity        Past Surgical History:   Procedure Laterality Date     SECTION      x 3    DILATION AND CURETTAGE OF UTERUS         Family History   Problem Relation Age of Onset    Skin cancer Mother     Cancer Mother     Diabetes Family     No Known Problems Father      I have reviewed and agree with the history as documented.    E-Cigarette/Vaping    E-Cigarette Use Never User      E-Cigarette/Vaping Substances     Social History     Tobacco Use    Smoking status: Former     Current packs/day: 0.00      Average packs/day: 0.3 packs/day for 1 year (0.3 ttl pk-yrs)     Types: Cigarettes     Start date:      Quit date:      Years since quittin.0    Smokeless tobacco: Never    Tobacco comments:     Only smoked 1-2 cigs per day at most   Vaping Use    Vaping status: Never Used   Substance Use Topics    Alcohol use: No    Drug use: No       Review of Systems   Unable to perform ROS: Mental status change       Physical Exam  Physical Exam  Vitals and nursing note reviewed.   Constitutional:       General: She is in acute distress.      Appearance: She is well-developed. She is ill-appearing. She is not diaphoretic.   HENT:      Head: Normocephalic and atraumatic.      Right Ear: External ear normal.      Left Ear: External ear normal.      Nose: Nose normal.      Mouth/Throat:      Mouth: Mucous membranes are moist.      Pharynx: Oropharynx is clear.   Eyes:      General: No scleral icterus.     Conjunctiva/sclera: Conjunctivae normal.      Pupils: Pupils are equal, round, and reactive to light.   Neck:      Vascular: No carotid bruit.   Cardiovascular:      Rate and Rhythm: Normal rate and regular rhythm.      Pulses: Normal pulses.      Heart sounds: Normal heart sounds. No murmur heard.  Pulmonary:      Effort: Pulmonary effort is normal.      Breath sounds: Normal breath sounds.   Abdominal:      General: Bowel sounds are normal.      Palpations: Abdomen is soft. There is no mass.      Tenderness: There is no abdominal tenderness.   Musculoskeletal:         General: No tenderness. Normal range of motion.      Cervical back: Normal range of motion and neck supple.      Right lower leg: No edema.      Left lower leg: No edema.   Skin:     General: Skin is warm and dry.      Capillary Refill: Capillary refill takes less than 2 seconds.      Findings: No bruising or rash.   Neurological:      General: No focal deficit present.      Mental Status: She is alert. She is disoriented.      Cranial Nerves: No  cranial nerve deficit.      Sensory: No sensory deficit.      Motor: No weakness.      Coordination: Coordination abnormal.      Gait: Gait abnormal.      Deep Tendon Reflexes: Reflexes are normal and symmetric.   Psychiatric:         Attention and Perception: She is inattentive.         Mood and Affect: Affect is flat.         Speech: Speech is slurred and tangential.         Behavior: Behavior is slowed. Behavior is cooperative.         Thought Content: Thought content includes suicidal ideation. Thought content includes suicidal plan.         Cognition and Memory: Cognition is impaired.         Judgment: Judgment is inappropriate.         Vital Signs  ED Triage Vitals   Temperature Pulse Respirations Blood Pressure SpO2   01/03/24 1035 01/03/24 1015 01/03/24 1015 01/03/24 1015 01/03/24 1015   97.5 °F (36.4 °C) 76 14 140/93 97 %      Temp Source Heart Rate Source Patient Position - Orthostatic VS BP Location FiO2 (%)   01/03/24 1035 01/03/24 1015 01/03/24 1015 01/03/24 1015 --   Axillary Monitor Lying Left arm       Pain Score       01/03/24 1015       No Pain           Vitals:    01/03/24 1300 01/03/24 1345 01/03/24 1430 01/03/24 1459   BP: 152/86 153/84 146/81 130/76   Pulse: 70 68 68 65   Patient Position - Orthostatic VS:             Visual Acuity  Visual Acuity      Flowsheet Row Most Recent Value   L Pupil Size (mm) 4   R Pupil Size (mm) 4   L Pupil Shape Round   R Pupil Shape Round            ED Medications  Medications   chlorhexidine (PERIDEX) 0.12 % oral rinse 15 mL (has no administration in time range)   lactated ringers bolus 2,000 mL (0 mL Intravenous Stopped 1/3/24 1441)       Diagnostic Studies  Results Reviewed       Procedure Component Value Units Date/Time    FLU/RSV/COVID - if FLU/RSV clinically relevant [[id number]]  (Normal) Collected: 01/03/24 1019    Lab Status: Final result Specimen: Nares from Nose Updated: 01/03/24 1119     SARS-CoV-2 Negative     INFLUENZA A PCR Negative     INFLUENZA B  PCR Negative     RSV PCR Negative    Narrative:      FOR PEDIATRIC PATIENTS - copy/paste COVID Guidelines URL to browser: [email or web address]    SARS-CoV-2 assay is a Nucleic Acid Amplification assay intended for the  qualitative detection of nucleic acid from SARS-CoV-2 in nasopharyngeal  swabs. Results are for the presumptive identification of SARS-CoV-2 RNA.    Positive results are indicative of infection with SARS-CoV-2, the virus  causing COVID-19, but do not rule out bacterial infection or co-infection  with other viruses. Laboratories within the [place] and its  territories are required to report all positive results to the appropriate  public health authorities. Negative results do not preclude SARS-CoV-2  infection and should not be used as the sole basis for treatment or other  patient management decisions. Negative results must be combined with  clinical observations, patient history, and epidemiological information.  This test has not been FDA cleared or approved.    This test has been authorized by FDA under an Emergency Use Authorization  (EUA). This test is only authorized for the duration of time the  declaration that circumstances exist justifying the authorization of the  emergency use of an in vitro diagnostic tests for detection of SARS-CoV-2  virus and/or diagnosis of COVID-19 infection under section 564(b)(1) of  the Act, 21 U.S.C. 360bbb-3(b)(1), unless the authorization is terminated  or revoked sooner. The test has been validated but independent review by FDA  and CLIA is pending.    Test performed using [employer]pert: This RT-PCR assay targets N2,  a region unique to SARS-CoV-2. A conserved region in the E-gene was chosen  for pan-Sarbecovirus detection which includes SARS-CoV-2.    According to CMS-2020-01-R, this platform meets the definition of high-throughput technology.    TSH, 3rd generation with Free T4 reflex  [[id number]]  (Normal) Collected: 01/03/24 1019    Lab Status: Final result Specimen: Blood from Arm, Right Updated: 01/03/24 1111     TSH 3RD GENERATON 1.757 uIU/mL     Salicylate level [[id number]]  (Normal) Collected: 01/03/24 1019    Lab Status: Final result Specimen: Blood from Arm, Right Updated: 01/03/24 1103     Salicylate Lvl <5 mg/dL     Rapid drug screen, urine [[id number]] Collected: 01/03/24 1034    Lab Status: Final result Specimen: Urine, Catheter Updated: 01/03/24 1101     Amph/Meth UR Negative     Barbiturate Ur Negative     Benzodiazepine Urine Negative     Cocaine Urine Negative     Methadone Urine --     Opiate Urine Negative     PCP Ur Negative     THC Urine Negative     Oxycodone Urine Negative    Narrative:      FOR MEDICAL PURPOSES ONLY.   IF CONFIRMATION NEEDED PLEASE CONTACT THE LAB WITHIN 5 DAYS.    Drug Screen Cutoff Levels:  AMPHETAMINE/METHAMPHETAMINES  1000 ng/mL  BARBITURATES     200 ng/mL  BENZODIAZEPINES     200 ng/mL  COCAINE      300 ng/mL  METHADONE      300 ng/mL  OPIATES      300 ng/mL  PHENCYCLIDINE     25 ng/mL  THC       50 ng/mL  OXYCODONE      100 ng/mL    Basic metabolic panel [[id number]] Collected: 01/03/24 1019    Lab Status: Final result Specimen: Blood from Arm, Right Updated: 01/03/24 1052     Sodium 137 mmol/L      Potassium 3.6 mmol/L      Chloride 105 mmol/L      CO2 25 mmol/L      ANION GAP 7 mmol/L      BUN 13 mg/dL      Creatinine 0.70 mg/dL      Glucose 98 mg/dL      Calcium 9.6 mg/dL      eGFR 112 ml/min/1.73sq m     Narrative:      National Kidney Disease Foundation guidelines for Chronic Kidney Disease (CKD):     Stage 1 with normal or high GFR (GFR > 90 mL/min/1.73 square meters)    Stage 2 Mild CKD (GFR = 60-89 mL/min/1.73 square meters)    Stage 3A Moderate CKD (GFR = 45-59 mL/min/1.73 square meters)    Stage 3B Moderate CKD (GFR = 30-44 mL/min/1.73 square meters)    Stage 4 Severe CKD (GFR = 15-29 mL/min/1.73 square meters)    Stage 5 End Stage CKD (GFR  <15 mL/min/1.73 square meters)  Note: GFR calculation is accurate only with a steady state creatinine    Hepatic function panel [[id number]]  (Normal) Collected: 01/03/24 1019    Lab Status: Final result Specimen: Blood from Arm, Right Updated: 01/03/24 1052     Total Bilirubin 0.36 mg/dL      Bilirubin, Direct 0.04 mg/dL      Alkaline Phosphatase 91 U/L      AST 13 U/L      ALT 12 U/L      Total Protein 7.6 g/dL      Albumin 4.4 g/dL     Acetaminophen level-If concentration is detectable, please discuss with medical  on call. [[id number]]  (Abnormal) Collected: 01/03/24 1019    Lab Status: Final result Specimen: Blood from Arm, Right Updated: 01/03/24 1051     Acetaminophen Level <2 ug/mL     Ethanol [[id number]]  (Normal) Collected: 01/03/24 1019    Lab Status: Final result Specimen: Blood from Arm, Right Updated: 01/03/24 1051     Ethanol Lvl <10 mg/dL     UA (URINE) with reflex to Scope [[id number]]  (Abnormal) Collected: 01/03/24 1034    Lab Status: Final result Specimen: Urine, Indwelling Wiley Catheter Updated: 01/03/24 1045     Color, UA Colorless     Clarity, UA Clear     Specific Gravity, UA <1.005     pH, UA 5.5     Leukocytes, UA Negative     Nitrite, UA Negative     Protein, UA Negative mg/dl      Glucose, UA Negative mg/dl      Ketones, UA Negative mg/dl      Urobilinogen, UA <2.0 mg/dl      Bilirubin, UA Negative     Occult Blood, UA Negative    Protime-INR [[id number]]  (Normal) Collected: 01/03/24 1019    Lab Status: Final result Specimen: Blood from Arm, Right Updated: 01/03/24 1041     Protime 12.4 seconds      INR 0.89    APTT [[id number]]  (Normal) Collected: 01/03/24 1019    Lab Status: Final result Specimen: Blood from Arm, Right Updated: 01/03/24 1041     PTT 29 seconds     POCT Blood Gas (CG8+) [[id number]]  (Abnormal) Collected: 01/03/24 1028    Lab Status: Final result Specimen: Venous Updated: 01/03/24 1034     ph, Tom ISTAT 7.362     pCO2, Tom i-STAT 42.4 mm HG      pO2, Tom i-STAT  25.0 mm HG      BE, i-STAT -1 mmol/L      HCO3, Tom i-STAT 24.0 mmol/L      CO2, i-STAT 25 mmol/L      O2 Sat, i-STAT 43 %      SODIUM, I-STAT 139 mmol/l      Potassium, i-STAT 3.5 mmol/L      Calcium, Ionized i-STAT 1.26 mmol/L      Hct, i-STAT 42 %      Hgb, i-STAT 14.3 g/dl      Glucose, i-STAT 101 mg/dl      Specimen Type VENOUS    POCT pregnancy, urine [[id number]]  (Normal) Resulted: 01/03/24 1034    Lab Status: Final result Updated: 01/03/24 1034     EXT Preg Test, Ur Negative     Control Valid    Fingerstick Glucose (POCT) [[id number]]  (Normal) Collected: 01/03/24 1024    Lab Status: Final result Updated: 01/03/24 1033     POC Glucose 89 mg/dl     CBC and differential [[id number]]  (Abnormal) Collected: 01/03/24 1019    Lab Status: Final result Specimen: Blood from Arm, Right Updated: 01/03/24 1028     WBC 14.47 Thousand/uL      RBC 4.48 Million/uL      Hemoglobin 13.8 g/dL      Hematocrit 41.1 %      MCV 92 fL      MCH 30.8 pg      MCHC 33.6 g/dL      RDW 12.3 %      MPV 10.8 fL      Platelets 397 Thousands/uL      nRBC 0 /100 WBCs      Neutrophils Relative 74 %      Immat GRANS % 1 %      Lymphocytes Relative 18 %      Monocytes Relative 6 %      Eosinophils Relative 1 %      Basophils Relative 0 %      Neutrophils Absolute 10.68 Thousands/µL      Immature Grans Absolute 0.08 Thousand/uL      Lymphocytes Absolute 2.59 Thousands/µL      Monocytes Absolute 0.93 Thousand/µL      Eosinophils Absolute 0.17 Thousand/µL      Basophils Absolute 0.02 Thousands/µL                    XR chest 1 view portable   ED Interpretation by Marcelino Kerns DO (01/03 1423)   Poor inspiration.  Crowding of vessels.      Final Result by Sofiya Peralta MD (01/03 1413)      Low lung volumes with vascular crowding; no acute disease.               Workstation performed: [id number]                    Procedures  ECG 12 Lead Documentation Only    Date/Time: 1/3/2024 10:18 AM    Performed by: Marcelino Kerns DO  Authorized by:  Marcelino Kerns DO    ECG reviewed by me, the ED Provider: yes    Patient location:  ED  Previous ECG:     Previous ECG:  Unavailable    Comparison to cardiac monitor: Yes    Interpretation:     Interpretation: normal    CriticalCare Time    Date/Time: 1/3/2024 3:22 PM    Performed by: Marcelino Kerns DO  Authorized by: Marcelino Kerns DO    Critical care provider statement:     Critical care time (minutes):  65    Critical care start time:  1/3/2024 10:20 AM    Critical care end time:  1/3/2024 12:55 PM    Critical care time was exclusive of:  Separately billable procedures and treating other patients and teaching time    Critical care was necessary to treat or prevent imminent or life-threatening deterioration of the following conditions:  Toxidrome    Critical care was time spent personally by me on the following activities:  Obtaining history from patient or surrogate, development of treatment plan with patient or surrogate, discussions with consultants, evaluation of patient's response to treatment, examination of patient, ordering and review of laboratory studies, ordering and review of radiographic studies, re-evaluation of patient's condition and review of old charts    I assumed direction of critical care for this patient from another provider in my specialty: no             ED Course  ED Course as of 01/03/24 1528   Wed Jan 03, 2024   1058 Discussed with toxicology, Dr. Hess.  Discussed with critical care, Dr. Kam.  Care will assess patient in the ED.                               SBIRT 22yo+      Flowsheet Row Most Recent Value   Initial Alcohol Screen: US AUDIT-C     1. How often do you have a drink containing alcohol? 0 Filed at: 01/03/2024 1121   2. How many drinks containing alcohol do you have on a typical day you are drinking?  0 Filed at: 01/03/2024 1121   3a. Male UNDER 65: How often do you have five or more drinks on one occasion? 0 Filed at: 01/03/2024 1121   3b. FEMALE Any Age, or MALE  "65+: How often do you have 4 or more drinks on one occassion? 0 Filed at: 01/03/2024 1121   Audit-C Score 0 Filed at: 01/03/2024 1121   JUANIS: How many times in the past year have you...    Used an illegal drug or used a prescription medication for non-medical reasons? Once or Twice Filed at: 01/03/2024 1121   DAST-10: In the past 12 months...    1. Have you used drugs other than those required for medical reasons? 0 Filed at: 01/03/2024 1121   2. Do you use more than one drug at a time? 0 Filed at: 01/03/2024 1121   3. Have you had medical problems as a result of your drug use (e.g., memory loss, hepatitis, convulsions, bleeding, etc.)? 0 Filed at: 01/03/2024 1121   4. Have you had \"blackouts\" or \"flashbacks\" as a result of drug use?YesNo 0 Filed at: 01/03/2024 1121   5. Do you ever feel bad or guilty about your drug use? 0 Filed at: 01/03/2024 1121   6. Does your spouse (or parent) ever complain about your involvement with drugs? 0 Filed at: 01/03/2024 1121   7. Have you neglected your family because of your use of drugs? 0 Filed at: 01/03/2024 1121   8. Have you engaged in illegal activities in order to obtain drugs? 0 Filed at: 01/03/2024 1121   9. Have you ever experienced withdrawal symptoms (felt sick) when you stopped taking drugs? 0 Filed at: 01/03/2024 1121   10. Are you always able to stop using drugs when you want to? 0 Filed at: 01/03/2024 1121   DAST-10 Score 0 Filed at: 01/03/2024 1121                      Medical Decision Making  Polysubstance overdose during suicide attempt.  Patient presents by ambulance with altered mental status, hemodynamically stable having ingested propranolol Benadryl and p.o. lisinopril.  Possibly Synthroid as well.  Concern for drug toxicities including hypotension, seizures, dysrhythmia, acute kidney injury, additional toxicities due to possible other unknown drug ingestion.    Discussed with toxicology.  Crystalloid given.  One-to-one observation began.  Labs drawn, " catheter placed.  Chest x-ray done.  Monitored closely.    Discussed with critical care who will admit the patient.  Patient remained stable here although was confused initially.  Repeat EKG at approximate 1 and half hours after initial showed no change in rate rhythm or intervals.  Blood pressure remained stable.    Amount and/or Complexity of Data Reviewed  Independent Historian: EMS  External Data Reviewed: ECG and notes.  Labs: ordered.  Radiology: ordered and independent interpretation performed.  ECG/medicine tests: ordered and independent interpretation performed. Decision-making details documented in ED Course.  Discussion of management or test interpretation with external provider(s): ,   General medicine, Dr. Nguyen  Critical care, Dr. Kam    Risk  Drug therapy requiring intensive monitoring for toxicity.  Decision regarding hospitalization.             Disposition  Final diagnoses:   Suicide attempt (HCC)   Intentional drug overdose, initial encounter (HCC)     Time reflects when diagnosis was documented in both MDM as applicable and the Disposition within this note       Time User Action Codes Description Comment    1/3/2024 12:55 PM Marcelino Kerns [T14.91XA] Suicide attempt (HCC)     1/3/2024 12:55 PM Marcelino Kerns [T50.902A] Intentional drug overdose, initial encounter (HCC)           ED Disposition       ED Disposition   Admit    Condition   Stable    Date/Time   Wed Edgardo 3, 2024 1255    Comment   Case was discussed with Dr. Kam and the patient's admission status was agreed to be Admission Status: inpatient status to the service of Dr. Kam .               Follow-up Information    None         Current Discharge Medication List        CONTINUE these medications which have NOT CHANGED    Details   acetaminophen (TYLENOL) 500 mg tablet Take 1,000 mg by mouth every 6 (six) hours as needed for mild pain      levothyroxine 50 mcg tablet Take 1 tablet (50 mcg total) by  mouth daily  Qty: 90 tablet, Refills: 0    Associated Diagnoses: Hypothyroidism, unspecified type      lisinopril (ZESTRIL) 10 mg tablet Take 1 tablet (10 mg total) by mouth daily  Qty: 90 tablet, Refills: 0    Associated Diagnoses: Essential hypertension      Multiple Vitamins-Minerals (MULTIVITAMIN ADULT PO) Take 1 tablet by mouth daily             No discharge procedures on file.    PDMP Review       None            ED Provider  Electronically Signed by             Marcelino Kerns DO  01/03/24 2018

## 2024-01-03 NOTE — ASSESSMENT & PLAN NOTE
Pt reports seeing a psychiatrist as o/p and recently started on escitalopram 5 mg daily on 12/15/23   confirmed that pt has been taking medication daily as prescribed: Script filled 12/15/23 for 30 count and there is only 12 tabs left in the bottle.   Hold home escitalopram for now until re-evaluated by psych

## 2024-01-03 NOTE — ASSESSMENT & PLAN NOTE
Intentional Overdose this am about 0900: Lisinopril 10 mg x 7 -10 tabs, propranolol 20 mg x 15 tabs, Diphenhydramine HCL 25 mg tabs x 24 tabs. And possibly levothyroxine tabs.   Tox consulted an input appreciated  Serial EKGs Q1-2hr to monitor QRS. If > 120 give IV lidocaine or 1-2 mEq/kg sodium bicarb bolus  Monitor for urinary retention, Wiley placed in ED  Monitor BP and renal function  Seizure precautions.   Give IV diazepam 5-10 mg for seizure, agitation, Fever > 103F, HR > 150 bpm, SBP > 180  1:1 monitoring started  Initial EKG with QRS 84  Continuous telemetry monitoring  Holding home escitalopram which she started recently on 12/15/23  Admit to SD1 for close monitoring

## 2024-01-03 NOTE — ED NOTES
Officer Jasen Clemens () filed a [facility] 302. Crisis confirmed with [facility] Delegate (Pritesh Baugh) to utilize the 302 was a back up incase the patient does not want to sign in once medically cleared. The 302 should be filed it patient attempts to leave the hospital or declines IP treatment on a 201. The copy of the suicide note is attached to the back up 302. Officer Sarath states that he spoke with patients  who will take care of patient's car and their children.     302 statement: I responded to [place] for a reported possible suicidal subject. Upon arrival, Barbie  was with EMS on the gurney getting ready to be taken to the hospital for an apparent overdose. Open pill packets and bottles were found in her car. I also found a suicide note (attached).

## 2024-01-03 NOTE — CONSULTS
INTERPROFESSIONAL (PHONE) CONSULTATION - Medical Toxicology  Barbie Garcia 33 y.o. female MRN: [id number]  Unit/Bed#: ED 08 Encounter: [id number]      Reason for Consult / Principal Problem: overdose  Inpatient consult to Toxicology  Consult performed by: Paola Hess MD  Consult ordered by: Marcelino Kerns DO        01/03/24      ASSESSMENT:  Barbie is a 33-year-old woman status post intentional overdose of propranolol, lisinopril, diphenhydramine, and levothyroxine.    RECOMMENDATIONS:     In addition to being a beta-blocker which can result in hypotension and bradycardia, propranolol is also a potent sodium channel blocker which can lead to QRS widening and cardiac arrhythmias.  It also crosses the blood-brain barrier and leads to seizures.    Lisinopril is an ACE inhibitor that can lead to hypotension and renal dysfunction.    Diphenhydramine is an antihistamine, antimuscarinic, and is also notably a sodium channel blocker.  Antihistamines lead to sedation.  Antimuscarinic toxicity presents as altered mental status, agitation, seizures, elevated body temperature, tachycardia, flushed skin, dry mucous membranes, dilated pupils, decreased bowel sounds, urinary retention.    Levothyroxine is synthetic T4, which requires conversion in the body to active T3.  This is a rate limiting step resulting in delay of toxicity typically past 5 days, though typically patients do not become significantly toxic.  Doses are than 5 mg have been noted to be toxic.  Clinical symptoms the levothyroxine intoxication include tachycardia, hyperthermia, flushing, diarrhea, vomiting, headache, supraventricular tachycardia which may result in  hypotension, seizures.     1.  Monitor for urinary retention     2.  Seizure precautions     3.  5-10 mg IV diazepam for seizures and agitation, SBP > 180, HR > 150, T > 103F     4.  Avoid neuroleptics, anticholinergic agents at this time     5.  Q1-2 EKGs over 24 hours, can space out throughout  the day if stable  - if QRS > 120, give 1-2 mEq per kg sodium bicarbonate  - goal: QRS < 120, titrate to pH 7.55, K > 4.0  - if QRS widening is refractory to sodium bicarbonate, please administer IV lidocaine or hypertonic saline     6.  Supportive care including IV fluids as needed     For further questions, please contact the medical  on call via [facility] between 8am and 9pm. If between 9pm and 8am, please reach out to the [facility] at [phone].     Please see additional teaching note below:    Medical Toxicology   [facility]  Discussion: ?-Adrenergic Antagonist Toxicity    Uses ?-Adrenergic antagonists are used to treat hypertension, coronary artery disease, congestive heart failure, and tachydysrhythmias. Other indications include hyperthyroidism, migraine headache, tremor, and panic attacks.    B-Adrenergic Receptors There are 4 ?-Adrenergic receptor subunits: ?1, ?2, ?3, and ?4. The ?1-adrenergic subtype is mainly found on cardiac myocytes. Their activation increases chronotropy, automaticity, and inotropy. ?1-adrenergic receptors are also in the kidney, where their stimulation leads to increased renin. In the eye, they increase production of aqueous humor. ?2-adrenergic receptors are found in various organs. They are present in the heart but at decreased density compared to ?1-adrenergic receptors. They are abundant in arteries and veins, where they mediate dilation. In the airways, they cause bronchodilation and decreased respiratory secretions. Glucose regulation is also affected. ?2-adrenergic receptors on the pancreas, when stimulated, increase insulin secretion. In the liver they increase gluconeogenesis, while in skeletal muscle they stimulate glycogenolysis and take up potassium. ?3-adrenergic receptors are found on adipocytes where they stimulate lipolysis. The function of ?4-adrenergic receptors is unknown.    Pathophysiology of B-Adrenergic Receptors  When stimulated by catecholamines, a second messenger cascade is activated which ultimately causes calcium to enter the cell. (see figure below, from [facility]’s Toxicologic Emergencies, 7th edition, p. 434 ) The receptor is coupled with a Gs protein that activates adenyl cylase (AC), converting ATP to cAMP. Protein Kinase A is then activated which phosphorylates the slow calcium channels on the cell, causing them to open, allowing calcium to flow into the cell. This influx of calcium binds to troponin and also stimulates the sarcoplasmic reticulum to release more calcium. The calcium binds to troponin, changing its confirmation, thereby allowing the actin and myosin fibers to slide across each other. The end result is a myocyte contraction and increased inotropy.                                          B-Adrenergic Receptor Antagonists These drugs work by competitively blocking catecholamines at ?-adrenergic receptors. In general, they cause decreased heart rate, blood pressure, and bronchoconstriction. Several characteristics help us understand their effects in therapeutic and overdose ingestions. Cardioselectivity- some ?-blockers are intended to affect both ?1 and ?2 receptors (eg. labetalol). Others favor the ?1 receptor (eg. metoprolol, atenolol) or the ?2 receptor (eg. albuterol). In overdose these drugs may lose their receptor specificity. Alpha activity- Labetalol is one of the ?-blocking agents that also blocks alpha- adrenergic receptors. Lipid solubility- These drugs easily cross into the brain. Propranolol is an example. Overdose may lead to altered mental status, seizures, and coma in addition to their cardiovascular effects. Membrane stabilizing effect- Certain ?-blockers inhibit the fast sodium channels on cardiac myocytes, similar to Class IA antidysrhythmics. This may cause slowed action potential, wide QRS, and prolonged QTc. Overdose may lead to dysrhythmias. Intrinsic sympathomimetic activity  (ED) or partial agonists- Some ?-blockers are also partial agonists, causing sympathomimetic effects in therapeutic or overdose exposures. Examples include acebutolol and pindolol.    Some B-Adrenergic Antagonists In-Depth  Atenolol (Tenormin) It is a long-acting selective ?1-antagonist. It comes in 25-, 50-, and 100mg regular release tablets. The adult therapeutic dose is 25 to 200mg orally once a day. It is highly water soluble and is excreted unchanged in the urine, accumulating in renal failure. It is the least lipophilic, therefore mental status is not affected unless hypotension is affecting cerebral blood flow, and it has no sodium channel blocking effects.  Its elimination half-life is 5-8 hours in therapeutic doses. It appears to be minimally toxic as a single substance overdose.     Labetalol (Normodyne) is a non-selective ?-antagonist and alpha-adrenergic antagonist. It is 3x more potent at the ?-receptors (vs. alpha) when taken orally, and 7x more so when given parenterally. Vasodilation may occur, compounding hypotension. It is moderately lipid soluble, and has little sodium channel blockade effects. It is available in 100-, 200-, and 300 mg tablets. The adult therapeutic dose is 200-2400mg a day in two divided doses.    Metoprolol (Toprol) is a selective ?1-antagonist. It is moderately lipophilic and seizures have been reported in overdose. It has little sodium channel blockade. It comes in 50-mg and 100-mg tablets and 50-mg, 100-mg, and 200-mg modified-release tablets. The adult therapeutic dose is 50 to 400mg/day orally once or divided into two doses per day.    Propranolol (Inderal) Several characteristics make this drug the most serious of the class in overdose. It is lipophilic, crossing the blood-brain barrier. Delirium, coma, and seizures may result. It also inhibits fast sodium channels on the myocardium, similar to Class IA antidysrhythmics. This may lead to conduction abnormalities, widened  QRS, and ventricular tachydysrhythmias. It comes in regular-release form with doses of 10-, 20-, 40-, 60-, and 80-mg tablets. The sustained release preparations are 120- and 160-mg capsules. The adult therapeutic dose is  mg/day in two to four divided doses for the regular release, and 80 to 160 mg/day in one dose for the extended release.     Sotalol (Betapace) It is a nonselective ?-adrenergic antagonist not commonly used, but is noteworthy for its toxicity. It has low lipophilicity, and no sodium channel blockade, but it does block the delayed rectifier potassium current on the myocyte, slowing repolarization. In therapeutic and overdose, the action potential and QTc are prolonged, and there is an increased risk for ventricular dysrhythmias such as torsade de pointes. Toxicity may be delayed in overdose. There are reports of tachydysrhythmias developing 9 hours after ingestion and persisting for 48 hours.    Extended Release Preparation The toxicity in overdose is increased due to delayed onset of effects and prolonged duration of toxicity.    Clinical Manifestations of Overdose In overdose the therapeutic effects are exaggerated, often causing bradycardia and hypotension. While these agents have the potential for serious sequelae, most overdoses are benign. Those at higher risk for serious consequences include those with cardiovascular disease who depend on increased catecholamines at baseline, those who have ingested other cardiosuppressive drugs, large ingestions, and overdoses with sustained release preparations. Cardioselective drugs in overdose may lose their receptor specificity, affecting ?2 receptors. Sinoatrial node function is decreased resulting in bradycardia to sinus arrest. Hypotension is caused by decreased contractility. Congestive heart failure may occur, especially in those with underlying cardiovascular disease. Altered mental status and seizures may be the first symptoms of overdose  with the lipophilic agents, such as propranolol.  Hypoglycemia is a well-known complication in children after ?-adrenergic overdose but is unusual in adults. Bronchospasm and hyperkalemia are also rare. Most persons become symptomatic after overdose within 6 hours. The exception is sotalol, which has delayed onset of action.    Management of Overdose  Patients who have overdosed may require a range of supportive measures depending on the ?-adrenergic antagonist ingested, the formulation, the amount ingested, and the patient’s co-morbidities. The structural integrity of the heart is not affected in overdose, unless hypoperfusion leads to ischemia and permanent damage. In general, the toxicity of ?-blockers in overdose is low. Good supportive care is essential to bridge the patient while the drug effect wears off.    Address the A, B, Cs    Treat seizures in the customary manner with benzodiazepines.    Gastrointestinal decontamination  Charcoal is indicated if the patient can protect their airway and presents within 1 hour of ingestion, or later if sustained release preparations have been ingested.  Orogastric lavage should be considered if the patient presents early after a large exposure especially sustained release products, ingests more toxic drugs such as propranolol or sotalol, or co-ingests other cardiosuppressant medications like calcium channel blockers.  Oropharyngeal manipulation may cause bradycardia, so have atropine at the bedside.   Whole bowel irrigation may have a role when treating a large overdose of sustained release preparations.    Specific Treatments  Atropine. By antagonizing the muscarinic receptors of the heart, the heart rate is increased. Atropine should be given if the patient is bradycardic, but may not be sufficient to counteract the drug effect of the ?-adrenergic antagonist taken. The dose is 0.5 mg to 3.0 mg IV for total dose of 3 mg in adults.  Intravenous fluids. Start with  judicious use of .9NS to support blood pressure. In general, avoid giving more than 2L because these patients are at risk for congestive heart failure. If their blood pressure is not adequately supported with fluids, move to additional therapeutic options.  Positive inotropes  Catecholamines. Patients who are not stabilized after atropine and intravenous fluids should be started on a pressor. The most effective pressor is one that is readily available and which providers feel comfortable using. Dopamine or epinephrine is usually reasonable choices. Isoproterenol, a pure ?-agonist, seems like a logical choice, but in the higher doses needed to counteract ?-antagonism, it may cause dysrhythmias and vasodilation.  Dopamine 1-20 mcg/kg/min.  Epinephrine 1-20 mcg/min. Has combined alpha and beta-agonist properties. Large doses have been successful at treating ?-blocker overdoses.  Phosphodiesterase inhibitors (PDIs), such as amrinone and milrinone inhibit the action of phosphodiesterase, thereby reducing the breakdown of cAMP. This leads to more activated protein kinase A which causes more calcium channels to open. Their use is limited by their vasodilatory effect.  Glucagon. There are glucagon receptors on the heart. Like the ?-adrenergic receptors, they are coupled to Gs proteins. When stimulated, they trigger a cascade of second messengers which result in slow calcium channels opening on the myocyte, causing calcium to enter the cell. Glucagon also inhibits phosphodiesterase, thereby slowing the breakdown of cAMP. This leads to myocyte contraction and increased inotropy.  The dose of glucagon in this setting is 5-10mg IV over 1-2 minutes. If the patient’s blood pressure responds, start a drip at the effective dose.   Glucagon also relaxes the lower esophageal sphincter, predisposing patients to vomit. Pretreat with an antiemetic and make sure the patient’s airway is protected.  Bicarbonate. It should be considered  when treating overdoses of ?-adrenergic drugs showing sodium channel blockade effect, such as propranolol. The dose is 1-2 amps IV bolus, repeat as needed.  Calcium  Increased extracellular calcium may help stimulate more calcium channels to open.   The suggested dose of calcium chloride is 1g of 10% solution IV through a central line. Calcium gluconate may be given peripherally but has 1/3 the amount of calcium.  High dose insulin and glucose  The heart prefers free fatty acids for energy. When ?-blockers are used therapeutically or in overdose, the heart shifts to carbohydrates for nutrition. However, the heart appears to become insulin resistant, more so in overdose with ?-blockers.  The use of high dose insulin with glucose to maintain euglycemia has been shown to overcome this insulin resistance, allowing the heart to function better. This therapy was first used for calcium channel blocker overdoses, but has been effective in an animal model of ?-blocker overdose and in several human case reports. The positive response to high dose insulin is usually delayed 15-60 minutes.  The recommended dose for insulin is 0.5-1.0 U/kg/hr with glucose infusion of 1g/kg/hr, titrated to maintain euglycemia.  Ventricular pacing  Pacer pads should be placed on the patient at the beginning of the resuscitation.   Transvenous pacing may be tried if standard supportive measures are unsuccessful.  Mechanical Life Support  Intra-aortic balloon pump may be considered if cardiogenic shock is unresponsive to above measures.  Extracorporeal circulation has been used with success in severe, refractory cases.  Hemodialysis  Some ?-adrenergic antagonists are theoretically dialyzable because they are water soluble, have low volumes of distribution, and low protein binding. Examples include atenolol and acebutolol.  Hemodialysis is usually not practical because of hemodynamic instability.      References    JED Paez. “?-Receptor Antagonists”  in Critical Care Toxicology: Diagnosis and Management of the Critically Poisoned Patient, 1st edition ,pp. 403-412.    POOL Ramirez and QUOC Cerda. “Type II Antidysrhythmics Agents (?-Receptor Blocking Agents) in Medical Toxicology, 3rd edition, pp.679-689.    JED Garber. “?-Adrenergic Antagonists” in [facility]’s Toxicologic Emergencies, 7th edition, pp.741-757.    MELISSA Alfaro and CESAR Price “When Cardiovascular Medications Become Toxins: Managing ?-Blocker, CCB, and Digoxin Overdoses” in Emergency Medicine Practice, vol. 7, #5, September 2005.    Medical Toxicology  [facility]  Anticholinergic Syndrome  Updated 03/04/2008    Background: Anticholinergic intoxication can occur from exposure to a wide variety of prescription and over-the-counter medications and numerous plants and mushrooms. Common drugs that have anticholinergic activities include: antihistamines, antipsychotics, antispasmodics, skeletal muscle relaxants, and tricyclic antidepressants (TCAs). Plants and mushrooms containing anticholinergic alkaloids include: jimsonweed (Datura stramonium), deadly nightshade (Atropa belladonna), and fly agaric (Amanita muscaria).  Mechanism of Toxicity:  Competitively antagonize the effect of acetylcholine at peripheral muscarinic receptors. They mostly affect exocrine glands (such as sweating and salivation) and smooth muscle. Inhibition of muscarinic activities in the heart leads to a rapid heartbeat.  Pharmacokinetics: Absorption may be delayed due to its effect on GI motility. Duration of toxic effect can be quite prolonged (e.g. benztropine  2-3 days)  Toxic dose: The range of toxicity is highly variable and unpredictable.  Examples of Fatal doses from case reports:  Young Child: 1-2 mg Atropine, instilled in the eye  Adult: 32 mg Atropine, IM injection  Minimal effect: increased heart rate and dry mouth after 360 mg of trospium chloride  Clinical presentation: Anticholinergic syndrome  is characterized by warm, dry, flushed skin, dry mucous membranes, mydriasis, delirium, tachycardia, ileus, and urinary retention. Jerky myoclonic movements and choreoathetosis are common and can lead to rhabdomyolysis. Hyperthermia, coma, respiratory arrest and seizures may occur.  Diagnosis: Based on history of exposure and typical features of anticholinergic syndrome. Trial dose of physostigmine can be used to confirm the diagnosis, especially with rapid reversal of the syndrome.  Laboratory studies: Not generally available for the anticholinergics, but other labs can be useful such as electrolytes, glucose, CPK, and ECG.  Treatment:   ABC  Seizure and muscular hyperactivity: Should be treated with benzodiazepines. Treat aggressively to prevent hyperthermia and rhabdomyolysis and their resultant complications. If unsuccessful use phenobarbital or propofol.  Delirium: Treat with benzodiazepines, if resistant to benzodiazepines consider treating with physostigmine.  GI decontamination maybe helpful in delayed presentation secondary to decreased GI motility provided that the patient is awake and alert.  Enhanced elimination: Procedures such as hemodialysis and repeated-dose activated charcoal are not effective in removing anticholinergic agents.      Physostigmine:   Pharmacology: Physostigmine is a carbamate and a reversible inhibitor of acetylcholinesterase. It increases acetylcholine concentration, causing stimulation of both muscarinic and nicotinic receptors. It also can penetrate the blood-brain barrier. It has nonspecific arousal effects.  Onset of action: 3-8 minutes after parenteral administration.  Duration of action: 30-90 minutes.  Elimination half-life: 15-40 minutes.  Indications:  Severe anticholinergic syndrome from antimuscarinic agents. Generally used to reverse delirium resistant to benzodiazepines.  Diagnostically: To differentiate functional psychosis from anticholinergic  delirium.  Contraindications:  Should not be used as an antidote for cyclic antidepressant overdose because it may worsen cardiac conduction disturbance, cause bradyarrythmias or asystole, and aggravate or precipitate seizures.  Do not use physostigmine with concurrent use of depolarizing neuromuscular blockers (e.g. succinylcholine).  Known hypersensitivity to agent or preservative.  Relative contraindication may include: Asthma, peripheral vascular disease, intestinal and bladder blockade, parkinsonian syndrome, and AV Block.  Adverse effects:  Bradycardia, heart block, and asystole.  Seizure (particularly with rapid administration or excessive dose).  Nausea, vomiting, hypersalivation, and diarrhea.  Bronchorrhea and bronchospasm.  Fasciculation and muscle weakness.  Dosage:  Adult: 0.5-2 mg slow IV push (£ 1 mg/min).  Children: 0.02 mg/kg slow IV push (£ 0.5 mg/min).  Repeat as needed every 10-30 minutes.  Precautions: Patient should be on a cardiac monitor. Atropine should be kept at bedside to treat excessive muscarinic stimulation.  Should not be administered IM or as a continuous infusion.  It is better to undertreat than to overtreat. Physostigmine toxicity results when used in excess or in the absence of antimuscarinic toxicity.    References:  Trey FERNANDEZ. Poisoning & Drug Overdose. 2007.  Micah RANDOLPH. [facility]’s Toxicologic Emergencies. 2006.    ACE inhibitors are a class of antihyertensive medications that work by inhibiting angiotensin converting enzyme to prevent conversion of angiotensin I to angiotensin II. Angiotensin II is a potent vasoconstrictor and stimulates aldosterone secretion, which reduces sodium and water retention. They also prevent the breakdown of bradykinin, a potent vasodilator, which is associated in the side effect of angioedema and cough. The net therapeutic effects are decreased blood pressure by way of vasodilation and decreased peripheral vascular resistance. Other beneficial  effects are increased cerebral, coronary and renal perfusion, and increased cardiac output. Toxicity is rare but may include hypotension, syncope, hyperkalemia, bradycardia, and renal failure. Treatment is mainly supportive.     Levothyroxine  Levothyroxin is a synthetic form the thyroid hormone and T4. Excess thyroid hormone exhibits the signs and symptoms of thyrotoxicosis. Excess thyroid hormone potentiates adrenergic activity. Symptoms of levothyroxine overdose may be delayed to 5 days as it must be  metabolized to T3 which is the active form of thyroid hormone. Acute ingestion of greater than 5 mg of levothyroxine is considered potentially toxic. Clinical symptoms the levothyroxine intoxication include tachycardia, hyperthermia, flushing, diarrhea, vomiting, headache, supraventricular tachycardia which may result in  hypotension. Seizures have occurred in acute overdose.    Lab levels of T4 and T3 do not correspond with the risk of developing clinical symptoms in the overdose setting. Treatment usually consist of management the patient's symptoms including  propranolol for tachyarrhythmias. In the event of massive  levothyroxine overdose the conversion of T4-T3 can be inhibited by propylthiouracil. Hemodialysis is not useful as thyroid hormones  are extensively protein bound.          Hx and PE limited by the dynamics of a phone consultation. I have not personally interviewed or evaluated the patient, but only advised based on the information provided to me. Primary provider is responsible for all clinical decisions.     Pertinent history, physical exam and clinical findings and course discussed: Barbie Garcia is a 33 y.o. year old female who presents with intentional overdose of her medications including amounts of propranolol, diphenhydramine, lisinopril, levothyroxine.  History is limited due to patient's acute clinical condition.  The ED, patient has normal vital signs but is notably sleeping.  Slurred  speech noted.  No specific toxidrome noted on exam.    Review of systems and physical exam not performed by me.    Historical Information   Past Medical History:   Diagnosis Date    Allergic     Disease of thyroid gland     Hypertension     Hypothyroid     Obesity      Past Surgical History:   Procedure Laterality Date     SECTION      x 3    DILATION AND CURETTAGE OF UTERUS       Social History   Social History     Substance and Sexual Activity   Alcohol Use No     Social History     Substance and Sexual Activity   Drug Use No     Social History     Tobacco Use   Smoking Status Former    Current packs/day: 0.00    Average packs/day: 0.3 packs/day for 1 year (0.3 ttl pk-yrs)    Types: Cigarettes    Start date:     Quit date:     Years since quittin.0   Smokeless Tobacco Never   Tobacco Comments    Only smoked 1-2 cigs per day at most     Family History   Problem Relation Age of Onset    Skin cancer Mother     Cancer Mother     Diabetes Family     No Known Problems Father         Prior to Admission medications    Medication Sig Start Date End Date Taking? Authorizing Provider   acetaminophen (TYLENOL) 500 mg tablet Take 1,000 mg by mouth every 6 (six) hours as needed for mild pain    Historical Provider, MD   levothyroxine 50 mcg tablet Take 1 tablet (50 mcg total) by mouth daily 23   RADHA Garcia   lisinopril (ZESTRIL) 10 mg tablet Take 1 tablet (10 mg total) by mouth daily 23   RADHA Garcia   Multiple Vitamins-Minerals (MULTIVITAMIN ADULT PO) Take 1 tablet by mouth daily    Historical Provider, MD       No current facility-administered medications for this encounter.       No Known Allergies    Objective     No intake or output data in the 24 hours ending 24 1336    Invasive Devices:   Peripheral IV 24 Proximal;Right;Ventral (anterior) Forearm (Active)   Site Assessment WDL 24 1017   Line Status Blood return noted;Saline locked;Flushed 24 1017  "      Peripheral IV 01/03/24 Left;Ventral (anterior) Wrist (Active)   Site Assessment WDL 01/03/24 1017   Line Status Blood return noted;Saline locked;Flushed 01/03/24 1017       Vitals   Vitals:    01/03/24 1130 01/03/24 1200 01/03/24 1230 01/03/24 1300   BP: 140/85 137/90 142/86 152/86   TempSrc:       Pulse: 71 73 68 70   Resp: 19 20 19 22   Patient Position - Orthostatic VS:   Lying    Temp:             EKG, Pathology, and/or Other Studies: I have personally reviewed pertinent reports.    Normal sinus rhythm 70, QRS 80, QTc 423, no ST elevation or depression, no ectopy, no terminal R.  Overall impression: No toxicologic findings on EKG    Lab Results: I have personally reviewed pertinent reports.      Labs:    Results from last 7 days   Lab Units 01/03/24  1028 01/03/24  1019   WBC Thousand/uL  --  14.47*   HEMOGLOBIN g/dL  --  13.8   I STAT HEMOGLOBIN g/dl 14.3  --    HEMATOCRIT %  --  41.1   HEMATOCRIT, ISTAT % 42  --    PLATELETS Thousands/uL  --  397*   NEUTROS PCT %  --  74   LYMPHS PCT %  --  18   MONOS PCT %  --  6   EOS PCT %  --  1      Results from last 7 days   Lab Units 01/03/24  1028 01/03/24  1019   SODIUM mmol/L  --  137   POTASSIUM mmol/L  --  3.6   CHLORIDE mmol/L  --  105   CO2 mmol/L  --  25   CO2, I-STAT mmol/L 25  --    BUN mg/dL  --  13   CREATININE mg/dL  --  0.70   CALCIUM mg/dL  --  9.6   ALK PHOS U/L  --  91   ALT U/L  --  12   AST U/L  --  13   GLUCOSE, ISTAT mg/dl 101  --       Results from last 7 days   Lab Units 01/03/24  1019   INR  0.89   PTT seconds 29         No results found for: \"TROPONINI\"      Results from last 7 days   Lab Units 01/03/24  1019   ACETAMINOPHEN LVL ug/mL <2*   ETHANOL LVL mg/dL <10   SALICYLATE LVL mg/dL <5     Invalid input(s): \"EXTPREGUR\"      Imaging Studies: I have personally reviewed pertinent reports.      Counseling / Coordination of Care  Total time spent today 45 minutes. This was a phone consultation.       "

## 2024-01-03 NOTE — H&P
[facility]  H&P  Name: Barbie Garcia 33 y.o. female I MRN: [id number]  Unit/Bed#: -01 SDU I Date of Admission: 1/3/2024   Date of Service: 1/3/2024 I Hospital Day: 0      Assessment/Plan   * Intentional overdose (HCC)  Assessment & Plan  Intentional Overdose this am about 0900: Lisinopril 10 mg x 7 -10 tabs, propranolol 20 mg x 15 tabs, Diphenhydramine HCL 25 mg tabs x 24 tabs. And possibly levothyroxine tabs.   Tox consulted an input appreciated  Serial EKGs Q1-2hr to monitor QRS. If > 120 give IV lidocaine or 1-2 mEq/kg sodium bicarb bolus  Monitor for urinary retention, Wiley placed in ED  Monitor BP and renal function  Seizure precautions.   Give IV diazepam 5-10 mg for seizure, agitation, Fever > 103F, HR > 150 bpm, SBP > 180  1:1 monitoring started  Initial EKG with QRS 84  Continuous telemetry monitoring  Holding home escitalopram which she started recently on 12/15/23  Admit to SD1 for close monitoring    AMS (altered mental status)  Assessment & Plan  Presented to the ED lethargic after intentional OD  MS improving   COntinue frequent monitoring    Depression  Assessment & Plan  Pt reports seeing a psychiatrist as o/p and recently started on escitalopram 5 mg daily on 12/15/23   confirmed that pt has been taking medication daily as prescribed: Script filled 12/15/23 for 30 count and there is only 12 tabs left in the bottle.   Hold home escitalopram for now until re-evaluated by psych     Hypokalemia  Assessment & Plan  Potassium 3.6 on admission  Replete and trend daily     Leukocytosis  Assessment & Plan  WBC 14   Likely stress related  Will monitor      Hypothyroidism  Assessment & Plan  Hold home dose, pt may have taken in overdose  Close monitoring per Tox as discussed above           History of Present Illness     HPI: Barbie Garcia is a 33 y.o. who presents after intentional overdose. PMHx of depression recently started escitalopram 5 mg daily on  12/15/23, hypothyroidism, and HTN. Pt sent concerning text to spouse which made him call 911 to check on her. She was found lethargic and brought into ED. Ingestion time about 0900, took confirmed lisinopril 10 mg x 7-10 tabs, propranolol 20 mg x 15 tabs, and diphenhydramine HCL 25 mg x 24 tabs. She may possibly have taken levothyroxine unknown amount. No prior SI hx or attempts.   Pt admitted to SD1 for closer monitoring.     History obtained from chart review, the patient, and .  Review of Systems   Psychiatric/Behavioral:  Positive for suicidal ideas.    All other systems reviewed and are negative.    Disposition: Stepdown Level 1  Historical Information   Past Medical History:  No date: Allergic  No date: Disease of thyroid gland  No date: Hypertension  No date: Hypothyroid  No date: Obesity Past Surgical History:  No date:  SECTION      Comment:  x 3  No date: DILATION AND CURETTAGE OF UTERUS   Current Outpatient Medications   Medication Instructions    acetaminophen (TYLENOL) 1,000 mg, Oral, Every 6 hours PRN    diphenhydrAMINE (BENADRYL) 25 mg, Oral, Every 6 hours PRN    levothyroxine 50 mcg, Oral, Daily    lisinopril (ZESTRIL) 10 mg, Oral, Daily    Multiple Vitamins-Minerals (MULTIVITAMIN ADULT PO) 1 tablet, Oral, Daily    propranolol (INDERAL) 10 mg, Oral, 3 times daily    No Known Allergies   Social History     Tobacco Use    Smoking status: Former     Current packs/day: 0.00     Average packs/day: 0.3 packs/day for 1 year (0.3 ttl pk-yrs)     Types: Cigarettes     Start date:      Quit date:      Years since quittin.0    Smokeless tobacco: Never    Tobacco comments:     Only smoked 1-2 cigs per day at most   Vaping Use    Vaping status: Never Used   Substance Use Topics    Alcohol use: No    Drug use: No    Family History   Problem Relation Age of Onset    Skin cancer Mother     Cancer Mother     Diabetes Family     No Known Problems Father           Objective                             Vitals I/O      Most Recent Min/Max in 24hrs   Temp 97.8 °F (36.6 °C) Temp  Min: 97.5 °F (36.4 °C)  Max: 97.8 °F (36.6 °C)   Pulse 68 Pulse  Min: 65  Max: 76   Resp 18 Resp  Min: 14  Max: 22   /80 BP  Min: 130/76  Max: 153/84   O2 Sat 98 % SpO2  Min: 96 %  Max: 99 %      Intake/Output Summary (Last 24 hours) at 1/3/2024 1629  Last data filed at 1/3/2024 1533  Gross per 24 hour   Intake --   Output 1825 ml   Net -1825 ml       Diet NPO; Sips of clear liquids    Invasive Monitoring           Physical Exam   Physical Exam  Eyes:      Extraocular Movements: Extraocular movements intact.      Conjunctiva/sclera: Conjunctivae normal.      Pupils: Pupils are equal, round, and reactive to light.   Skin:     General: Skin is warm and dry.   HENT:      Head: Normocephalic and atraumatic.      Mouth/Throat:      Mouth: Mucous membranes are moist.   Neck:      Vascular: No JVD.   Cardiovascular:      Rate and Rhythm: Normal rate and regular rhythm.      Pulses: Normal pulses.      Heart sounds: Normal heart sounds.   Musculoskeletal:         General: Normal range of motion.   Abdominal: General: Bowel sounds are normal.      Palpations: Abdomen is soft.   Constitutional:       Appearance: She is well-developed and well-nourished.   Pulmonary:      Effort: Pulmonary effort is normal. No respiratory distress.      Breath sounds: Normal breath sounds.   Neurological:      General: No focal deficit present.      Mental Status: She is alert and oriented to person, place and time. Mental status is at baseline. She is calm. She is CAM ICU negative.      Motor: Strength full and intact in all extremities.   Genitourinary/Anorectal:  Wiley present.          Diagnostic Studies      EKG: NSR 77 bpm, QRS 84, [id number]       Medications:  Scheduled PRN   chlorhexidine, 15 mL, Q12H RIKI  potassium chloride, 20 mEq, Once          Continuous          Labs:    CBC    Recent Labs     01/03/24  1019 01/03/24  1028   WBC 14.47*  --     HGB 13.8 14.3   HCT 41.1 42   *  --      BMP    Recent Labs     01/03/24  1019 01/03/24  1028   SODIUM 137  --    K 3.6  --      --    CO2 25 25   AGAP 7  --    BUN 13  --    CREATININE 0.70  --    CALCIUM 9.6  --        Coags    Recent Labs     01/03/24  1019   INR 0.89   PTT 29        Additional Electrolytes  Recent Labs     01/03/24  1028   CAIONIZED 1.26          Blood Gas    No recent results  No recent results LFTs  Recent Labs     01/03/24  1019   ALT 12   AST 13   ALKPHOS 91   ALB 4.4   TBILI 0.36       Infectious  No recent results  Glucose  Recent Labs     01/03/24  1019   GLUC 98             Non-Critical Care Time Statement: I have spent a total time of 60 minutes in caring for this patient including Diagnostic results, Prognosis, Risks and benefits of tx options, Patient and family education, Impressions, Counseling / Coordination of care, Documenting in the medical record, Reviewing / ordering tests, medicine, procedures  , Obtaining or reviewing history  , and Communicating with other healthcare professionals .   Anticipated Length of Stay is > 2 midnights  RADHA Arechiga

## 2024-01-04 ENCOUNTER — HOSPITAL ENCOUNTER (INPATIENT)
Facility: HOSPITAL | Age: 34
LOS: 5 days | Discharge: HOME | DRG: 885 | End: 2024-01-09
Attending: STUDENT IN AN ORGANIZED HEALTH CARE EDUCATION/TRAINING PROGRAM | Admitting: STUDENT IN AN ORGANIZED HEALTH CARE EDUCATION/TRAINING PROGRAM
Payer: COMMERCIAL

## 2024-01-04 VITALS
HEART RATE: 83 BPM | HEIGHT: 62 IN | RESPIRATION RATE: 20 BRPM | DIASTOLIC BLOOD PRESSURE: 78 MMHG | OXYGEN SATURATION: 95 % | BODY MASS INDEX: 35.82 KG/M2 | SYSTOLIC BLOOD PRESSURE: 102 MMHG | TEMPERATURE: 98.4 F | WEIGHT: 194.67 LBS

## 2024-01-04 DIAGNOSIS — F33.2 SEVERE EPISODE OF RECURRENT MAJOR DEPRESSIVE DISORDER, WITHOUT PSYCHOTIC FEATURES (HCC): Primary | ICD-10-CM

## 2024-01-04 DIAGNOSIS — E03.9 HYPOTHYROIDISM: ICD-10-CM

## 2024-01-04 DIAGNOSIS — Z00.8 MEDICAL CLEARANCE FOR PSYCHIATRIC ADMISSION: ICD-10-CM

## 2024-01-04 DIAGNOSIS — T50.902A INTENTIONAL DRUG OVERDOSE, INITIAL ENCOUNTER (HCC): ICD-10-CM

## 2024-01-04 DIAGNOSIS — E87.6 HYPOKALEMIA: ICD-10-CM

## 2024-01-04 PROBLEM — R41.82 AMS (ALTERED MENTAL STATUS): Status: RESOLVED | Noted: 2024-01-03 | Resolved: 2024-01-04

## 2024-01-04 PROBLEM — D72.829 LEUKOCYTOSIS: Status: RESOLVED | Noted: 2024-01-03 | Resolved: 2024-01-04

## 2024-01-04 LAB
ALBUMIN SERPL BCP-MCNC: 3.5 G/DL (ref 3.5–5)
ALP SERPL-CCNC: 73 U/L (ref 34–104)
ALT SERPL W P-5'-P-CCNC: 9 U/L (ref 7–52)
ANION GAP SERPL CALCULATED.3IONS-SCNC: 5 MMOL/L
AST SERPL W P-5'-P-CCNC: 11 U/L (ref 13–39)
BASOPHILS # BLD AUTO: 0.02 THOUSANDS/ÂΜL (ref 0–0.1)
BASOPHILS NFR BLD AUTO: 0 % (ref 0–1)
BILIRUB SERPL-MCNC: 0.6 MG/DL (ref 0.2–1)
BUN SERPL-MCNC: 10 MG/DL (ref 5–25)
CA-I BLD-SCNC: 1.11 MMOL/L (ref 1.12–1.32)
CALCIUM SERPL-MCNC: 8.5 MG/DL (ref 8.4–10.2)
CHLORIDE SERPL-SCNC: 108 MMOL/L (ref 96–108)
CO2 SERPL-SCNC: 26 MMOL/L (ref 21–32)
CREAT SERPL-MCNC: 0.88 MG/DL (ref 0.6–1.3)
EOSINOPHIL # BLD AUTO: 0.14 THOUSAND/ÂΜL (ref 0–0.61)
EOSINOPHIL NFR BLD AUTO: 2 % (ref 0–6)
ERYTHROCYTE [DISTWIDTH] IN BLOOD BY AUTOMATED COUNT: 12.7 % (ref 11.6–15.1)
GFR SERPL CREATININE-BSD FRML MDRD: 85 ML/MIN/1.73SQ M
GLUCOSE SERPL-MCNC: 79 MG/DL (ref 65–140)
HCT VFR BLD AUTO: 37.7 % (ref 34.8–46.1)
HGB BLD-MCNC: 12.3 G/DL (ref 11.5–15.4)
IMM GRANULOCYTES # BLD AUTO: 0.03 THOUSAND/UL (ref 0–0.2)
IMM GRANULOCYTES NFR BLD AUTO: 0 % (ref 0–2)
LYMPHOCYTES # BLD AUTO: 2.54 THOUSANDS/ÂΜL (ref 0.6–4.47)
LYMPHOCYTES NFR BLD AUTO: 29 % (ref 14–44)
MAGNESIUM SERPL-MCNC: 2 MG/DL (ref 1.9–2.7)
MCH RBC QN AUTO: 30.7 PG (ref 26.8–34.3)
MCHC RBC AUTO-ENTMCNC: 32.6 G/DL (ref 31.4–37.4)
MCV RBC AUTO: 94 FL (ref 82–98)
MONOCYTES # BLD AUTO: 0.61 THOUSAND/ÂΜL (ref 0.17–1.22)
MONOCYTES NFR BLD AUTO: 7 % (ref 4–12)
NEUTROPHILS # BLD AUTO: 5.33 THOUSANDS/ÂΜL (ref 1.85–7.62)
NEUTS SEG NFR BLD AUTO: 62 % (ref 43–75)
NRBC BLD AUTO-RTO: 0 /100 WBCS
PHOSPHATE SERPL-MCNC: 3.5 MG/DL (ref 2.7–4.5)
PLATELET # BLD AUTO: 301 THOUSANDS/UL (ref 149–390)
PMV BLD AUTO: 10.8 FL (ref 8.9–12.7)
POTASSIUM SERPL-SCNC: 4 MMOL/L (ref 3.5–5.3)
PROT SERPL-MCNC: 6 G/DL (ref 6.4–8.4)
RBC # BLD AUTO: 4.01 MILLION/UL (ref 3.81–5.12)
SODIUM SERPL-SCNC: 139 MMOL/L (ref 135–147)
WBC # BLD AUTO: 8.67 THOUSAND/UL (ref 4.31–10.16)

## 2024-01-04 PROCEDURE — NC001 PR NO CHARGE: Performed by: INTERNAL MEDICINE

## 2024-01-04 PROCEDURE — 99238 HOSP IP/OBS DSCHRG MGMT 30/<: CPT | Performed by: NURSE PRACTITIONER

## 2024-01-04 PROCEDURE — 85025 COMPLETE CBC W/AUTO DIFF WBC: CPT | Performed by: NURSE PRACTITIONER

## 2024-01-04 PROCEDURE — 83735 ASSAY OF MAGNESIUM: CPT | Performed by: NURSE PRACTITIONER

## 2024-01-04 PROCEDURE — 82330 ASSAY OF CALCIUM: CPT | Performed by: NURSE PRACTITIONER

## 2024-01-04 PROCEDURE — 99255 IP/OBS CONSLTJ NEW/EST HI 80: CPT | Performed by: STUDENT IN AN ORGANIZED HEALTH CARE EDUCATION/TRAINING PROGRAM

## 2024-01-04 PROCEDURE — 93005 ELECTROCARDIOGRAM TRACING: CPT

## 2024-01-04 PROCEDURE — 84100 ASSAY OF PHOSPHORUS: CPT | Performed by: NURSE PRACTITIONER

## 2024-01-04 PROCEDURE — 80053 COMPREHEN METABOLIC PANEL: CPT | Performed by: NURSE PRACTITIONER

## 2024-01-04 RX ORDER — TRAZODONE HYDROCHLORIDE 50 MG/1
50 TABLET ORAL
Status: DISCONTINUED | OUTPATIENT
Start: 2024-01-04 | End: 2024-01-09 | Stop reason: HOSPADM

## 2024-01-04 RX ORDER — HYDROXYZINE HYDROCHLORIDE 25 MG/1
100 TABLET, FILM COATED ORAL
Status: CANCELLED | OUTPATIENT
Start: 2024-01-04

## 2024-01-04 RX ORDER — BENZTROPINE MESYLATE 1 MG/1
1 TABLET ORAL 2 TIMES DAILY PRN
Status: CANCELLED | OUTPATIENT
Start: 2024-01-04

## 2024-01-04 RX ORDER — HYDROXYZINE 50 MG/1
100 TABLET, FILM COATED ORAL
Status: DISCONTINUED | OUTPATIENT
Start: 2024-01-04 | End: 2024-01-09 | Stop reason: HOSPADM

## 2024-01-04 RX ORDER — OLANZAPINE 10 MG/2ML
5 INJECTION, POWDER, FOR SOLUTION INTRAMUSCULAR
Status: DISCONTINUED | OUTPATIENT
Start: 2024-01-04 | End: 2024-01-09 | Stop reason: HOSPADM

## 2024-01-04 RX ORDER — OLANZAPINE 5 MG/1
5 TABLET ORAL
Status: DISCONTINUED | OUTPATIENT
Start: 2024-01-04 | End: 2024-01-09 | Stop reason: HOSPADM

## 2024-01-04 RX ORDER — MAGNESIUM HYDROXIDE/ALUMINUM HYDROXICE/SIMETHICONE 120; 1200; 1200 MG/30ML; MG/30ML; MG/30ML
30 SUSPENSION ORAL EVERY 4 HOURS PRN
Status: CANCELLED | OUTPATIENT
Start: 2024-01-04

## 2024-01-04 RX ORDER — BENZTROPINE MESYLATE 1 MG/1
1 TABLET ORAL 2 TIMES DAILY PRN
Status: DISCONTINUED | OUTPATIENT
Start: 2024-01-04 | End: 2024-01-09 | Stop reason: HOSPADM

## 2024-01-04 RX ORDER — OLANZAPINE 10 MG/2ML
10 INJECTION, POWDER, FOR SOLUTION INTRAMUSCULAR
Status: CANCELLED | OUTPATIENT
Start: 2024-01-04

## 2024-01-04 RX ORDER — OLANZAPINE 10 MG/1
10 TABLET ORAL
Status: CANCELLED | OUTPATIENT
Start: 2024-01-04

## 2024-01-04 RX ORDER — ACETAMINOPHEN 325 MG/1
975 TABLET ORAL EVERY 6 HOURS PRN
Status: CANCELLED | OUTPATIENT
Start: 2024-01-04

## 2024-01-04 RX ORDER — CALCIUM GLUCONATE 20 MG/ML
1 INJECTION, SOLUTION INTRAVENOUS ONCE
Qty: 50 ML | Refills: 0 | Status: COMPLETED | OUTPATIENT
Start: 2024-01-04 | End: 2024-01-04

## 2024-01-04 RX ORDER — MINERAL OIL AND PETROLATUM 150; 830 MG/G; MG/G
OINTMENT OPHTHALMIC 4 TIMES DAILY PRN
Status: DISCONTINUED | OUTPATIENT
Start: 2024-01-04 | End: 2024-01-09 | Stop reason: HOSPADM

## 2024-01-04 RX ORDER — AMOXICILLIN 250 MG
1 CAPSULE ORAL DAILY PRN
Status: CANCELLED | OUTPATIENT
Start: 2024-01-04

## 2024-01-04 RX ORDER — ACETAMINOPHEN 325 MG/1
650 TABLET ORAL EVERY 6 HOURS PRN
Status: DISCONTINUED | OUTPATIENT
Start: 2024-01-04 | End: 2024-01-09 | Stop reason: HOSPADM

## 2024-01-04 RX ORDER — ACETAMINOPHEN 325 MG/1
975 TABLET ORAL EVERY 6 HOURS PRN
Status: DISCONTINUED | OUTPATIENT
Start: 2024-01-04 | End: 2024-01-09 | Stop reason: HOSPADM

## 2024-01-04 RX ORDER — OLANZAPINE 10 MG/2ML
5 INJECTION, POWDER, FOR SOLUTION INTRAMUSCULAR
Status: CANCELLED | OUTPATIENT
Start: 2024-01-04

## 2024-01-04 RX ORDER — OLANZAPINE 10 MG/2ML
10 INJECTION, POWDER, FOR SOLUTION INTRAMUSCULAR
Status: DISCONTINUED | OUTPATIENT
Start: 2024-01-04 | End: 2024-01-09 | Stop reason: HOSPADM

## 2024-01-04 RX ORDER — HYDROXYZINE HYDROCHLORIDE 25 MG/1
25 TABLET, FILM COATED ORAL
Status: DISCONTINUED | OUTPATIENT
Start: 2024-01-04 | End: 2024-01-09 | Stop reason: HOSPADM

## 2024-01-04 RX ORDER — LORAZEPAM 2 MG/ML
2 INJECTION INTRAMUSCULAR EVERY 6 HOURS PRN
Status: CANCELLED | OUTPATIENT
Start: 2024-01-04

## 2024-01-04 RX ORDER — HYDROXYZINE HYDROCHLORIDE 25 MG/1
25 TABLET, FILM COATED ORAL
Status: CANCELLED | OUTPATIENT
Start: 2024-01-04

## 2024-01-04 RX ORDER — MINERAL OIL AND PETROLATUM 150; 830 MG/G; MG/G
OINTMENT OPHTHALMIC 4 TIMES DAILY PRN
Status: CANCELLED | OUTPATIENT
Start: 2024-01-04

## 2024-01-04 RX ORDER — BENZTROPINE MESYLATE 1 MG/ML
1 INJECTION INTRAMUSCULAR; INTRAVENOUS 2 TIMES DAILY PRN
Status: CANCELLED | OUTPATIENT
Start: 2024-01-04

## 2024-01-04 RX ORDER — ACETAMINOPHEN 325 MG/1
650 TABLET ORAL EVERY 4 HOURS PRN
Status: CANCELLED | OUTPATIENT
Start: 2024-01-04

## 2024-01-04 RX ORDER — HYDROXYZINE HYDROCHLORIDE 25 MG/1
50 TABLET, FILM COATED ORAL
Status: CANCELLED | OUTPATIENT
Start: 2024-01-04

## 2024-01-04 RX ORDER — OLANZAPINE 2.5 MG/1
2.5 TABLET, FILM COATED ORAL
Status: DISCONTINUED | OUTPATIENT
Start: 2024-01-04 | End: 2024-01-09 | Stop reason: HOSPADM

## 2024-01-04 RX ORDER — MAGNESIUM HYDROXIDE/ALUMINUM HYDROXICE/SIMETHICONE 120; 1200; 1200 MG/30ML; MG/30ML; MG/30ML
30 SUSPENSION ORAL EVERY 4 HOURS PRN
Status: DISCONTINUED | OUTPATIENT
Start: 2024-01-04 | End: 2024-01-09 | Stop reason: HOSPADM

## 2024-01-04 RX ORDER — OLANZAPINE 10 MG/1
10 TABLET ORAL
Status: DISCONTINUED | OUTPATIENT
Start: 2024-01-04 | End: 2024-01-09 | Stop reason: HOSPADM

## 2024-01-04 RX ORDER — LORAZEPAM 2 MG/ML
2 INJECTION INTRAMUSCULAR EVERY 6 HOURS PRN
Status: DISCONTINUED | OUTPATIENT
Start: 2024-01-04 | End: 2024-01-09 | Stop reason: HOSPADM

## 2024-01-04 RX ORDER — AMOXICILLIN 250 MG
1 CAPSULE ORAL DAILY PRN
Status: DISCONTINUED | OUTPATIENT
Start: 2024-01-04 | End: 2024-01-09 | Stop reason: HOSPADM

## 2024-01-04 RX ORDER — OLANZAPINE 5 MG/1
5 TABLET ORAL
Status: CANCELLED | OUTPATIENT
Start: 2024-01-04

## 2024-01-04 RX ORDER — BISACODYL 10 MG
10 SUPPOSITORY, RECTAL RECTAL DAILY PRN
Status: DISCONTINUED | OUTPATIENT
Start: 2024-01-04 | End: 2024-01-09 | Stop reason: HOSPADM

## 2024-01-04 RX ORDER — HYDROXYZINE 50 MG/1
50 TABLET, FILM COATED ORAL
Status: DISCONTINUED | OUTPATIENT
Start: 2024-01-04 | End: 2024-01-09 | Stop reason: HOSPADM

## 2024-01-04 RX ORDER — TRAZODONE HYDROCHLORIDE 50 MG/1
50 TABLET ORAL
Status: CANCELLED | OUTPATIENT
Start: 2024-01-04

## 2024-01-04 RX ORDER — ACETAMINOPHEN 325 MG/1
650 TABLET ORAL EVERY 4 HOURS PRN
Status: DISCONTINUED | OUTPATIENT
Start: 2024-01-04 | End: 2024-01-09 | Stop reason: HOSPADM

## 2024-01-04 RX ORDER — OLANZAPINE 5 MG/1
2.5 TABLET ORAL
Status: CANCELLED | OUTPATIENT
Start: 2024-01-04

## 2024-01-04 RX ORDER — ACETAMINOPHEN 325 MG/1
650 TABLET ORAL EVERY 6 HOURS PRN
Status: CANCELLED | OUTPATIENT
Start: 2024-01-04

## 2024-01-04 RX ORDER — DIPHENHYDRAMINE HYDROCHLORIDE 50 MG/ML
50 INJECTION INTRAMUSCULAR; INTRAVENOUS EVERY 6 HOURS PRN
Status: DISCONTINUED | OUTPATIENT
Start: 2024-01-04 | End: 2024-01-09 | Stop reason: HOSPADM

## 2024-01-04 RX ORDER — BENZTROPINE MESYLATE 1 MG/ML
1 INJECTION INTRAMUSCULAR; INTRAVENOUS 2 TIMES DAILY PRN
Status: DISCONTINUED | OUTPATIENT
Start: 2024-01-04 | End: 2024-01-09 | Stop reason: HOSPADM

## 2024-01-04 RX ORDER — DIPHENHYDRAMINE HYDROCHLORIDE 50 MG/ML
50 INJECTION INTRAMUSCULAR; INTRAVENOUS EVERY 6 HOURS PRN
Status: CANCELLED | OUTPATIENT
Start: 2024-01-04

## 2024-01-04 RX ORDER — BISACODYL 10 MG
10 SUPPOSITORY, RECTAL RECTAL DAILY PRN
Status: CANCELLED | OUTPATIENT
Start: 2024-01-04

## 2024-01-04 RX ADMIN — CHLORHEXIDINE GLUCONATE 0.12% ORAL RINSE 15 ML: 1.2 LIQUID ORAL at 08:44

## 2024-01-04 RX ADMIN — CALCIUM GLUCONATE 1 G: 20 INJECTION, SOLUTION INTRAVENOUS at 08:44

## 2024-01-04 RX ADMIN — SODIUM CHLORIDE, SODIUM GLUCONATE, SODIUM ACETATE, POTASSIUM CHLORIDE, MAGNESIUM CHLORIDE, SODIUM PHOSPHATE, DIBASIC, AND POTASSIUM PHOSPHATE 100 ML/HR: .53; .5; .37; .037; .03; .012; .00082 INJECTION, SOLUTION INTRAVENOUS at 01:26

## 2024-01-04 NOTE — CASE MANAGEMENT
Case Management Progress Note    Patient name Barbie Garcia  Location /-01 MRN [id number]  : 1990 Date 2024       LOS (days): 1  Geometric Mean LOS (GMLOS) (days):   Days to GMLOS:        OBJECTIVE:        Current admission status: Inpatient  Preferred Pharmacy:   [id number] - [place] - [address]  [address]  [place] [address]  Phone: [phone] Fax: [phone]    Primary Care Provider: RADHA Garcia    Primary Insurance: BLUE CROSS  Secondary Insurance:     PROGRESS NOTE:    Faxed 201 to [facility]  Call placed to [facility]: faxed information to [phone]  [facility]: faxed information to [phone]  Louisa: faxed information to [phone]    [facility] can accept Pt after 9:30pm tonight. Met with Pt. Pt in agreement with [facility]. Call placed to Pt's insurance([facility]:[phone]), spoke with Sully who informed MAURICIO that all authorizations request goes through [facility] Portal and will respond to the accepting facility within 48hrs and follow up within 7 days. CM obtained login and pasword information from Sully for [employer] [facility] portal. Submitted auth request through portal and submission ID is [id number]. Informed psych intake of auth status and submission to portal. CTS transport submitted through [facility]. Await transport time.

## 2024-01-04 NOTE — PROGRESS NOTES
24 1200   Clinical Encounter Type   Visited With Patient;Health care provider   Routine Visit Introduction   Referral From Nurse      Pastoral Care Progress Note    2024  Patient: Barbie Gracia : 1990  Admission Date & Time: 1/3/2024 1011  MRN: [id number] CSN: [id number]           introductory visit during rounds. No expressed needs or concerns at this time. Chaplains remain available.

## 2024-01-04 NOTE — ASSESSMENT & PLAN NOTE
Presented to the ED lethargic after intentional OD  MS improving   Continue frequent monitoring  Delirium precautions  Daily CAM-ICU

## 2024-01-04 NOTE — ASSESSMENT & PLAN NOTE
Intentional Overdose 1/3 about 0900: Lisinopril 10 mg x 7 -10 tabs, propranolol 20 mg x 15 tabs, Diphenhydramine HCL 25 mg tabs x 24 tabs. And possibly levothyroxine tabs.   Tox consulted an input appreciated  Serial EKGs Q1-2hr to monitor QRS. If > 120 give IV lidocaine or 1-2 mEq/kg sodium bicarb bolus  Monitor for urinary retention, Wiley placed in ED  Monitor BP and renal function  Seizure precautions.   Give IV diazepam 5-10 mg for seizure, agitation, Fever > 103F, HR > 150 bpm, SBP > 180  1:1 monitoring started  Initial EKG with QRS 84  Continuous telemetry monitoring  Holding home escitalopram which she started recently on 12/15/23

## 2024-01-04 NOTE — PLAN OF CARE
Problem: Potential for Falls  Goal: Patient will remain free of falls  Description: INTERVENTIONS:  - Educate patient/family on patient safety including physical limitations  - Instruct patient to call for assistance with activity   - Consult OT/PT to assist with strengthening/mobility   - Keep Call bell within reach  - Keep bed low and locked with side rails adjusted as appropriate  - Keep care items and personal belongings within reach  - Initiate and maintain comfort rounds  - Make Fall Risk Sign visible to staff  - Apply yellow socks and bracelet for high fall risk patients  - Consider moving patient to room near nurses station  Outcome: Progressing     Problem: PAIN - ADULT  Goal: Verbalizes/displays adequate comfort level or baseline comfort level  Description: Interventions:  - Encourage patient to monitor pain and request assistance  - Assess pain using appropriate pain scale  - Administer analgesics based on type and severity of pain and evaluate response  - Implement non-pharmacological measures as appropriate and evaluate response  - Consider cultural and social influences on pain and pain management  - Notify physician/advanced practitioner if interventions unsuccessful or patient reports new pain  Outcome: Progressing     Problem: INFECTION - ADULT  Goal: Absence or prevention of progression during hospitalization  Description: INTERVENTIONS:  - Assess and monitor for signs and symptoms of infection  - Monitor lab/diagnostic results  - Monitor all insertion sites, i.e. indwelling lines, tubes, and drains  - Monitor endotracheal if appropriate and nasal secretions for changes in amount and color  - [place] appropriate cooling/warming therapies per order  - Administer medications as ordered  - Instruct and encourage patient and family to use good hand hygiene technique  - Identify and instruct in appropriate isolation precautions for identified infection/condition  Outcome: Progressing  Goal: Absence  of fever/infection during neutropenic period  Description: INTERVENTIONS:  - Monitor WBC    Outcome: Progressing     Problem: SAFETY ADULT  Goal: Patient will remain free of falls  Description: INTERVENTIONS:  - Educate patient/family on patient safety including physical limitations  - Instruct patient to call for assistance with activity   - Consult OT/PT to assist with strengthening/mobility   - Keep Call bell within reach  - Keep bed low and locked with side rails adjusted as appropriate  - Keep care items and personal belongings within reach  - Initiate and maintain comfort rounds  - Make Fall Risk Sign visible to staff  - Apply yellow socks and bracelet for high fall risk patients  - Consider moving patient to room near nurses station  Outcome: Progressing  Goal: Maintain or return to baseline ADL function  Description: INTERVENTIONS:  -  Assess patient's ability to carry out ADLs; assess patient's baseline for ADL function and identify physical deficits which impact ability to perform ADLs (bathing, care of mouth/teeth, toileting, grooming, dressing, etc.)  - Assess/evaluate cause of self-care deficits   - Assess range of motion  - Assess patient's mobility; develop plan if impaired  - Assess patient's need for assistive devices and provide as appropriate  - Encourage maximum independence but intervene and supervise when necessary  - Involve family in performance of ADLs  - Assess for home care needs following discharge   - Consider OT consult to assist with ADL evaluation and planning for discharge  - Provide patient education as appropriate  Outcome: Progressing  Goal: Maintains/Returns to pre admission functional level  Description: INTERVENTIONS:  - Perform AM-PAC 6 Click Basic Mobility/ Daily Activity assessment daily.  - Set and communicate daily mobility goal to care team and patient/family/caregiver.   - Collaborate with rehabilitation services on mobility goals if consulted  - Out of bed for  toileting  - Record patient progress and toleration of activity level   Outcome: Progressing     Problem: DISCHARGE PLANNING  Goal: Discharge to home or other facility with appropriate resources  Description: INTERVENTIONS:  - Identify barriers to discharge w/patient and caregiver  - Arrange for needed discharge resources and transportation as appropriate  - Identify discharge learning needs (meds, wound care, etc.)  - Arrange for interpretive services to assist at discharge as needed  - Refer to Case Management Department for coordinating discharge planning if the patient needs post-hospital services based on physician/advanced practitioner order or complex needs related to functional status, cognitive ability, or social support system  Outcome: Progressing     Problem: Knowledge Deficit  Goal: Patient/family/caregiver demonstrates understanding of disease process, treatment plan, medications, and discharge instructions  Description: Complete learning assessment and assess knowledge base.  Interventions:  - Provide teaching at level of understanding  - Provide teaching via preferred learning methods  Outcome: Progressing     Problem: NEUROSENSORY - ADULT  Goal: Achieves stable or improved neurological status  Description: INTERVENTIONS  - Monitor and report changes in neurological status  - Monitor vital signs such as temperature, blood pressure, glucose, and any other labs ordered   - Initiate measures to prevent increased intracranial pressure  - Monitor for seizure activity and implement precautions if appropriate      Outcome: Progressing  Goal: Remains free of injury related to seizures activity  Description: INTERVENTIONS  - Maintain airway, patient safety  and administer oxygen as ordered  - Monitor patient for seizure activity, document and report duration and description of seizure to physician/advanced practitioner  - If seizure occurs,  ensure patient safety during seizure  - Reorient patient post  seizure  - Seizure pads on all 4 side rails  - Instruct patient/family to notify RN of any seizure activity including if an aura is experienced  - Instruct patient/family to call for assistance with activity based on nursing assessment  - Administer anti-seizure medications if ordered    Outcome: Progressing     Problem: CARDIOVASCULAR - ADULT  Goal: Absence of cardiac dysrhythmias or at baseline rhythm  Description: INTERVENTIONS:  - Continuous cardiac monitoring, vital signs, obtain 12 lead EKG if ordered  - Administer antiarrhythmic and heart rate control medications as ordered  - Monitor electrolytes and administer replacement therapy as ordered  Outcome: Progressing     Problem: SELF HARM  Goal: Effect of psychiatric condition will be minimized and patient will be protected from self harm  Description: INTERVENTIONS:  - Assess impact of patient's symptoms on level of functioning, self-care needs and offer support as indicated  - Assess patient/family knowledge of depression, impact on illness and need for teaching  - Provide emotional support, presence and reassurance  - Assess for possible suicidal thoughts, ideation or self-harm. If patient expresses suicidal thoughts or statements do not leave alone, notify physician/AP immediately, initiate suicide precautions, and determine need for continual observation.  - initiate consults and referrals as appropriate (a mental health professional, Spiritual Care  Outcome: Progressing

## 2024-01-04 NOTE — ASSESSMENT & PLAN NOTE
Pt reports seeing a psychiatrist as o/p and recently started on escitalopram 5 mg daily on 12/15/23   confirmed that pt has been taking medication daily as prescribed: Script filled 12/15/23 for 30 count and there is only 12 tabs left in the bottle.   Hold home escitalopram for now until re-evaluated by psych   1:1 observation

## 2024-01-04 NOTE — CONSULTS
TELEConsultation - Behavioral Health   Barbie Garcia 33 y.o. female MRN: [id number]  Unit/Bed#: -01 SDU Encounter: [id number]    REQUIRED DOCUMENTATION:     1. This service was provided via Telemedicine.  2. Provider located in [place].  3. TeleMed provider: Gordon Chance MD  4. Identify all parties in room with patient during tele consult: patient  5. After connecting through televideo, patient was identified by name and date of birth. Parent/patient was then informed that this was being conducted confidentially over secure lines. My office door was closed. Parties in the room listed above as per #4.  Patient acknowledged consent and understanding of privacy and security of the Telemedicine visit. The patient is aware this is a billable service and understands that she can discontinue the visit at any time. I informed the patient that I have reviewed their record in Epic and presented the opportunity for them to ask any questions regarding the visit today.  The patient agreed to participate.       Chief Complaint: Intentional overdose    History of Present Illness   Physician Requesting Consult: Rodrigo Kam MD    Reason for Consult / Principal Problem: intentional overdose    Per ED Physician: 33-year-old female with history of hypothyroidism, hypertension and obesity was brought by ambulance from a van after suicide attempts by intentional drug ingestion.  She ingested propranolol 20 mg #30; diphenhydramine 25 mg # up to 24 and possibly lisinopril 10 mg quantity unknown.  She is sleepy.  Able answer some questions and follow commands but has slurred speech.  Closes her eyes when not being asked.  Pulse ox stable at this time with elevated blood pressure.     Per Crisis Worker: Officer Jasen Clemens () filed a [facility] 302. Crisis confirmed with [facility] Delegate (Pritesh Baugh) to utilize the 302 was a back up incase the patient does not want to sign in once medically cleared.  The 302 should be filed it patient attempts to leave the hospital or declines IP treatment on a 201. The copy of the suicide note is attached to the back up 302. Officer Sarath states that he spoke with patients  who will take care of patient's car and their children.      302 statement: I responded to [facility] for a reported possible suicidal subject. Upon arrival, Barbie  was with EMS on the gurney getting ready to be taken to the hospital for an apparent overdose. Open pill packets and bottles were found in her car. I also found a suicide note (attached).    On evaluation,    Barbie was calm and cooperative with interview.  She appeared dysphoric in  affect, exhibited linear and logical thought process.  She reports that she has been feeling depressed for the past several months along with poor sleep, difficulty concentrating.  Depressed mood more than half the time, worsening over the past several months in the context of life stressors including financial stress and a pending divorce with her soon-to-be ex- having a new relationship.  Reports that she was feeling particularly stressed about her relationship with her  and felt hopeless and as though no one wanted her around.  Reports that she took the rest of her blood pressure and psychiatric medications in an attempt to kill herself.  Reports that she was hoping she would not wake up.  Is unsure how she is feeling that she survived.  Reports that at this time she does not feel suicidal and does not have any suicidal ideation, intention, or plan.  She reports that she has not had suicidal thoughts in many years since she was a teenager when she took an intentional overdose of medications.  Reports that she was not hospitalized at that time and has not been hospitalized for psychiatric reasons in the past.  Recently started treatment with a new psychotherapist and psychiatrist several weeks ago and was started on Lexapro and  propranolol for depression and anxiety respectively.  Reports that she felt the medications were helping but the stress of her relationship was too much for her to cope with.  Reports that she does have some positive coping mechanisms in the form of going for a walk and engaging with friends at work.  Acknowledges that her depression and anxiety are a problem and has good insight into the fact that she requires further help for her depression.  We discussed the need for inpatient psychiatric treatment in the setting of worsening depression and suicide attempt.  She expressed understanding of the process and agreed to sign a voluntary 201 commitment for inpatient behavioral health treatment.  Denies SI/HI/AVH at this time.    Psychiatric Review Of Systems:  Medication side effects: none  Sleep: Poor  Appetite: no change  Hygiene: able to tend to instrumental and basic ADLs  Anxiety Symptoms: Endorses  Psychotic Symptoms: denies  Depression Symptoms: Endorses as per HPI  Manic Symptoms: denies  PTSD Symptoms: denies  Suicidal Thoughts: denies  Homicidal Thoughts: denies    Historical Information   Psychiatric History:   Diagnoses: MDD, anxiety  Inpatient Hx: None  Outpatient Hx: Has a current outpatient psychiatrist, recently started, no prior outpatient psychiatric treatment  Medications/Trials: On Lexapro and propranolol    Substance Abuse History:    Social History     Substance and Sexual Activity   Alcohol Use No     Social History     Substance and Sexual Activity   Drug Use No       I discussed substance abuse with the patient and, if pertinent, discussed risks vs benefits of decreasing frequency of use.    Family History:   Family History   Problem Relation Age of Onset    Skin cancer Mother     Cancer Mother     Diabetes Family     No Known Problems Father        Social History  Highest education: High school  Born: [facility]  Currently living: With  and kids  Relationships:  but  pending divorce  Children: 3  Occupation: [employer]  Rest of social history as per below:  Social History     Socioeconomic History    Marital status: /Civil Union     Spouse name: Joey    Number of children: 3    Years of education: Not on file    Highest education level: Not on file   Occupational History    Occupation: [employer]     Employer: [employer]   Tobacco Use    Smoking status: Former     Current packs/day: 0.00     Average packs/day: 0.3 packs/day for 1 year (0.3 ttl pk-yrs)     Types: Cigarettes     Start date:      Quit date:      Years since quittin.0    Smokeless tobacco: Never    Tobacco comments:     Only smoked 1-2 cigs per day at most   Vaping Use    Vaping status: Never Used   Substance and Sexual Activity    Alcohol use: No    Drug use: No    Sexual activity: Yes     Partners: Male     Birth control/protection: OCP   Other Topics Concern    Not on file   Social History Narrative    Always uses seat belt    Caffeine use    No Congregation beliefs     Social Determinants of Health     Financial Resource Strain: Not on file   Food Insecurity: Not on file   Transportation Needs: Not on file   Physical Activity: Not on file   Stress: Not on file   Social Connections: Not on file   Intimate Partner Violence: Not on file   Housing Stability: Not on file       Past Medical History:   Diagnosis Date    Allergic     Disease of thyroid gland     Hypertension     Hypothyroid     Obesity        Medical Review Of Systems:  Patient denies headache or dizziness.   Patient denies chest pain or palpitations.  Patient denies difficulty breathing or wheezing.  Patient denies nausea, vomiting, or diarrhea.  Patient denies polyuria or polydipsia.  Patient denies weakness or numbness.  Pertinent positives as per HPI.    Meds/Allergies   No Known Allergies  Current Facility-Administered Medications   Medication Dose Route Frequency    calcium gluconate 1 g in sodium chloride 0.9% 50 mL  (premix)  1 g Intravenous Once    chlorhexidine (PERIDEX) 0.12 % oral rinse 15 mL  15 mL Mouth/Throat Q12H RIKI       Current Medications:  Current medications as per above. All medications have been reviewed.   Risks, benefits, alternatives, and possible side effects of patient's psychiatric medications were discussed with patient.     Objective   Vital signs in last 24 hours:  Temp:  [97.5 °F (36.4 °C)-98.5 °F (36.9 °C)] 98.5 °F (36.9 °C)  HR:  [57-76] 72  Resp:  [14-22] 19  BP: ()/(53-93) 109/66    Mental Status Exam:  Appearance: casually dressed, consistent with stated age  Motor: no psychomotor retardation, no gait abnormalities  Behavior: cooperative, answers questions appropriately  Speech: soft, normal rhythm  Mood: depressed  Affect: constricted, depressed-appearing  Thought Process: linear and goal-oriented  Thought Content: denies auditory hallucinations, denies visual hallucinations, denies delusions  Risk Potential: denies suicidal ideation, plan, or intent. Denies homicidal ideation  Sensorium: Oriented to person, place, time, and situation  Cognition: cognitive ability appears intact but was not quantitatively tested  Consciousness: alert and awake  Attention: intact, able to focus without difficulty  Insight: fair  Judgement: limited      Laboratory results:  I have personally reviewed all pertinent laboratory/tests results.  Recent Results (from the past 48 hour(s))   ECG 12 lead    Collection Time: 01/03/24 10:18 AM   Result Value Ref Range    Ventricular Rate 77 BPM    Atrial Rate 77 BPM    [place] Interval 162 ms    QRSD Interval 84 ms    QT Interval 378 ms    QTC Interval 427 ms    P Axis 35 degrees    QRS Axis 56 degrees    T Wave Axis 60 degrees   CBC and differential    Collection Time: 01/03/24 10:19 AM   Result Value Ref Range    WBC 14.47 (H) 4.31 - 10.16 Thousand/uL    RBC 4.48 3.81 - 5.12 Million/uL    Hemoglobin 13.8 11.5 - 15.4 g/dL    Hematocrit 41.1 34.8 - 46.1 %    MCV 92 82 - 98  fL    MCH 30.8 26.8 - 34.3 pg    MCHC 33.6 31.4 - 37.4 g/dL    RDW 12.3 11.6 - 15.1 %    MPV 10.8 8.9 - 12.7 fL    Platelets 397 (H) 149 - 390 Thousands/uL    nRBC 0 /100 WBCs    Neutrophils Relative 74 43 - 75 %    Immat GRANS % 1 0 - 2 %    Lymphocytes Relative 18 14 - 44 %    Monocytes Relative 6 4 - 12 %    Eosinophils Relative 1 0 - 6 %    Basophils Relative 0 0 - 1 %    Neutrophils Absolute 10.68 (H) 1.85 - 7.62 Thousands/µL    Immature Grans Absolute 0.08 0.00 - 0.20 Thousand/uL    Lymphocytes Absolute 2.59 0.60 - 4.47 Thousands/µL    Monocytes Absolute 0.93 0.17 - 1.22 Thousand/µL    Eosinophils Absolute 0.17 0.00 - 0.61 Thousand/µL    Basophils Absolute 0.02 0.00 - 0.10 Thousands/µL   Protime-INR    Collection Time: 01/03/24 10:19 AM   Result Value Ref Range    Protime 12.4 11.6 - 14.5 seconds    INR 0.89 0.84 - 1.19   APTT    Collection Time: 01/03/24 10:19 AM   Result Value Ref Range    PTT 29 23 - 37 seconds   Basic metabolic panel    Collection Time: 01/03/24 10:19 AM   Result Value Ref Range    Sodium 137 135 - 147 mmol/L    Potassium 3.6 3.5 - 5.3 mmol/L    Chloride 105 96 - 108 mmol/L    CO2 25 21 - 32 mmol/L    ANION GAP 7 mmol/L    BUN 13 5 - 25 mg/dL    Creatinine 0.70 0.60 - 1.30 mg/dL    Glucose 98 65 - 140 mg/dL    Calcium 9.6 8.4 - 10.2 mg/dL    eGFR 112 ml/min/1.73sq m   Hepatic function panel    Collection Time: 01/03/24 10:19 AM   Result Value Ref Range    Total Bilirubin 0.36 0.20 - 1.00 mg/dL    Bilirubin, Direct 0.04 0.00 - 0.20 mg/dL    Alkaline Phosphatase 91 34 - 104 U/L    AST 13 13 - 39 U/L    ALT 12 7 - 52 U/L    Total Protein 7.6 6.4 - 8.4 g/dL    Albumin 4.4 3.5 - 5.0 g/dL   FLU/RSV/COVID - if FLU/RSV clinically relevant    Collection Time: 01/03/24 10:19 AM    Specimen: Nose; Nares   Result Value Ref Range    SARS-CoV-2 Negative Negative    INFLUENZA A PCR Negative Negative    INFLUENZA B PCR Negative Negative    RSV PCR Negative Negative   TSH, 3rd generation with Free T4  reflex    Collection Time: 01/03/24 10:19 AM   Result Value Ref Range    TSH 3RD GENERATON 1.757 0.450 - 4.500 uIU/mL   Ethanol    Collection Time: 01/03/24 10:19 AM   Result Value Ref Range    Ethanol Lvl <10 <10 mg/dL   Salicylate level    Collection Time: 01/03/24 10:19 AM   Result Value Ref Range    Salicylate Lvl <5 3 - 20 mg/dL   Acetaminophen level-If concentration is detectable, please discuss with medical  on call.    Collection Time: 01/03/24 10:19 AM   Result Value Ref Range    Acetaminophen Level <2 (L) 10 - 20 ug/mL   Fingerstick Glucose (POCT)    Collection Time: 01/03/24 10:24 AM   Result Value Ref Range    POC Glucose 89 65 - 140 mg/dl   POCT Blood Gas (CG8+)    Collection Time: 01/03/24 10:28 AM   Result Value Ref Range    ph, Tom ISTAT 7.362 7.300 - 7.400    pCO2, Tom i-STAT 42.4 42.0 - 50.0 mm HG    pO2, Tom i-STAT 25.0 (L) 35.0 - 45.0 mm HG    BE, i-STAT -1 -2 - 3 mmol/L    HCO3, Tom i-STAT 24.0 24.0 - 30.0 mmol/L    CO2, i-STAT 25 21 - 32 mmol/L    O2 Sat, i-STAT 43 (L) 60 - 85 %    SODIUM, I-STAT 139 136 - 145 mmol/l    Potassium, i-STAT 3.5 3.5 - 5.3 mmol/L    Calcium, Ionized i-STAT 1.26 1.12 - 1.32 mmol/L    Hct, i-STAT 42 34.8 - 46.1 %    Hgb, i-STAT 14.3 11.5 - 15.4 g/dl    Glucose, i-STAT 101 65 - 140 mg/dl    Specimen Type VENOUS    Rapid drug screen, urine    Collection Time: 01/03/24 10:34 AM   Result Value Ref Range    Amph/Meth UR Negative Negative    Barbiturate Ur Negative Negative    Benzodiazepine Urine Negative Negative    Cocaine Urine Negative Negative    Methadone Urine      Opiate Urine Negative Negative    PCP Ur Negative Negative    THC Urine Negative Negative    Oxycodone Urine Negative Negative   UA (URINE) with reflex to Scope    Collection Time: 01/03/24 10:34 AM   Result Value Ref Range    Color, UA Colorless     Clarity, UA Clear     Specific Gravity, UA <1.005 (L) 1.005 - 1.030    pH, UA 5.5 4.5, 5.0, 5.5, 6.0, 6.5, 7.0, 7.5, 8.0    Leukocytes, UA  Negative Negative    Nitrite, UA Negative Negative    Protein, UA Negative Negative mg/dl    Glucose, UA Negative Negative mg/dl    Ketones, UA Negative Negative mg/dl    Urobilinogen, UA <2.0 <2.0 mg/dl mg/dl    Bilirubin, UA Negative Negative    Occult Blood, UA Negative Negative   POCT pregnancy, urine    Collection Time: 01/03/24 10:34 AM   Result Value Ref Range    EXT Preg Test, Ur Negative     Control Valid    ECG 12 lead    Collection Time: 01/03/24 11:07 AM   Result Value Ref Range    Ventricular Rate 73 BPM    Atrial Rate 73 BPM    IN Interval 160 ms    QRSD Interval 82 ms    QT Interval 380 ms    QTC Interval 418 ms    P [place] 23 degrees    QRS Axis 56 degrees    T Wave Axis 47 degrees   ECG 12 lead    Collection Time: 01/03/24 12:04 PM   Result Value Ref Range    Ventricular Rate 70 BPM    Atrial Rate 70 BPM    IN Interval 158 ms    QRSD Interval 80 ms    QT Interval 392 ms    QTC Interval 423 ms    P [place] 32 degrees    QRS Axis 52 degrees    T Wave Axis 51 degrees   ECG 12 lead    Collection Time: 01/03/24  2:06 PM   Result Value Ref Range    Ventricular Rate 73 BPM    Atrial Rate 73 BPM    IN Interval 160 ms    QRSD Interval 80 ms    QT Interval 394 ms    QTC Interval 434 ms    P Axis 34 degrees    QRS Axis 56 degrees    T Wave Axis 45 degrees   ECG 12 lead    Collection Time: 01/03/24  3:04 PM   Result Value Ref Range    Ventricular Rate 68 BPM    Atrial Rate 68 BPM    IN Interval 156 ms    QRSD Interval 80 ms    QT Interval 416 ms    QTC Interval 442 ms    P [place] 32 degrees    QRS Axis 58 degrees    T Wave Axis 52 degrees   ECG 12 lead    Collection Time: 01/03/24  4:14 PM   Result Value Ref Range    Ventricular Rate 63 BPM    Atrial Rate 63 BPM    IN Interval 160 ms    QRSD Interval 80 ms    QT Interval 426 ms    QTC Interval 435 ms    P Axis 39 degrees    QRS Axis 51 degrees    T Wave Axis 47 degrees   ECG 12 lead    Collection Time: 01/03/24  7:06 PM   Result Value Ref Range    Ventricular Rate 63  BPM    Atrial Rate 63 BPM    GA Interval 160 ms    QRSD Interval 80 ms    QT Interval 420 ms    QTC Interval 429 ms    P Axis 38 degrees    QRS Axis 46 degrees    T Wave Axis 49 degrees   ECG 12 lead    Collection Time: 01/03/24  9:12 PM   Result Value Ref Range    Ventricular Rate 54 BPM    Atrial Rate 54 BPM    GA Interval 156 ms    QRSD Interval 82 ms    QT Interval 452 ms    QTC Interval 428 ms    P Axis 37 degrees    QRS Axis 50 degrees    T Wave Axis 49 degrees   ECG 12 lead    Collection Time: 01/03/24 10:09 PM   Result Value Ref Range    Ventricular Rate 65 BPM    Atrial Rate 65 BPM    GA Interval 158 ms    QRSD Interval 82 ms    QT Interval 446 ms    QTC Interval 463 ms    P Axis 40 degrees    QRS Axis 56 degrees    T Wave Axis 50 degrees   ECG 12 lead    Collection Time: 01/03/24 11:19 PM   Result Value Ref Range    Ventricular Rate 58 BPM    Atrial Rate 58 BPM    GA Interval 154 ms    QRSD Interval 82 ms    QT Interval 460 ms    QTC Interval 451 ms    P Axis 38 degrees    QRS Axis 54 degrees    T Wave Axis 52 degrees   ECG 12 lead    Collection Time: 01/04/24 12:20 AM   Result Value Ref Range    Ventricular Rate 61 BPM    Atrial Rate 61 BPM    GA Interval 152 ms    QRSD Interval 80 ms    QT Interval 446 ms    QTC Interval 448 ms    P [place] 44 degrees    QRS Axis 55 degrees    T Wave Axis 52 degrees   ECG 12 lead    Collection Time: 01/04/24  1:31 AM   Result Value Ref Range    Ventricular Rate 63 BPM    Atrial Rate 63 BPM    GA Interval 152 ms    QRSD Interval 80 ms    QT Interval 444 ms    QTC Interval 454 ms    P Axis 44 degrees    QRS Axis 56 degrees    T Wave Axis 56 degrees   ECG 12 lead    Collection Time: 01/04/24  2:36 AM   Result Value Ref Range    Ventricular Rate 57 BPM    Atrial Rate 57 BPM    GA Interval 152 ms    QRSD Interval 82 ms    QT Interval 444 ms    QTC Interval 432 ms    P [place] 39 degrees    QRS Axis 56 degrees    T Wave Axis 55 degrees   ECG 12 lead    Collection Time: 01/04/24   4:17 AM   Result Value Ref Range    Ventricular Rate 62 BPM    Atrial Rate 62 BPM    [place] Interval 152 ms    QRSD Interval 80 ms    QT Interval 432 ms    QTC Interval 438 ms    P Axis 38 degrees    QRS Axis 59 degrees    T Wave Axis 61 degrees   CBC and differential    Collection Time: 01/04/24  5:03 AM   Result Value Ref Range    WBC 8.67 4.31 - 10.16 Thousand/uL    RBC 4.01 3.81 - 5.12 Million/uL    Hemoglobin 12.3 11.5 - 15.4 g/dL    Hematocrit 37.7 34.8 - 46.1 %    MCV 94 82 - 98 fL    MCH 30.7 26.8 - 34.3 pg    MCHC 32.6 31.4 - 37.4 g/dL    RDW 12.7 11.6 - 15.1 %    MPV 10.8 8.9 - 12.7 fL    Platelets 301 149 - 390 Thousands/uL    nRBC 0 /100 WBCs    Neutrophils Relative 62 43 - 75 %    Immat GRANS % 0 0 - 2 %    Lymphocytes Relative 29 14 - 44 %    Monocytes Relative 7 4 - 12 %    Eosinophils Relative 2 0 - 6 %    Basophils Relative 0 0 - 1 %    Neutrophils Absolute 5.33 1.85 - 7.62 Thousands/µL    Immature Grans Absolute 0.03 0.00 - 0.20 Thousand/uL    Lymphocytes Absolute 2.54 0.60 - 4.47 Thousands/µL    Monocytes Absolute 0.61 0.17 - 1.22 Thousand/µL    Eosinophils Absolute 0.14 0.00 - 0.61 Thousand/µL    Basophils Absolute 0.02 0.00 - 0.10 Thousands/µL   Comprehensive metabolic panel    Collection Time: 01/04/24  5:03 AM   Result Value Ref Range    Sodium 139 135 - 147 mmol/L    Potassium 4.0 3.5 - 5.3 mmol/L    Chloride 108 96 - 108 mmol/L    CO2 26 21 - 32 mmol/L    ANION GAP 5 mmol/L    BUN 10 5 - 25 mg/dL    Creatinine 0.88 0.60 - 1.30 mg/dL    Glucose 79 65 - 140 mg/dL    Calcium 8.5 8.4 - 10.2 mg/dL    AST 11 (L) 13 - 39 U/L    ALT 9 7 - 52 U/L    Alkaline Phosphatase 73 34 - 104 U/L    Total Protein 6.0 (L) 6.4 - 8.4 g/dL    Albumin 3.5 3.5 - 5.0 g/dL    Total Bilirubin 0.60 0.20 - 1.00 mg/dL    eGFR 85 ml/min/1.73sq m   Magnesium    Collection Time: 01/04/24  5:03 AM   Result Value Ref Range    Magnesium 2.0 1.9 - 2.7 mg/dL   Phosphorus    Collection Time: 01/04/24  5:03 AM   Result Value Ref Range     Phosphorus 3.5 2.7 - 4.5 mg/dL   Calcium, ionized    Collection Time: 01/04/24  5:03 AM   Result Value Ref Range    Calcium, Ionized 1.11 (L) 1.12 - 1.32 mmol/L   ECG 12 lead    Collection Time: 01/04/24  6:05 AM   Result Value Ref Range    Ventricular Rate 62 BPM    Atrial Rate 62 BPM    [place] Interval 150 ms    QRSD Interval 78 ms    QT Interval 416 ms    QTC Interval 422 ms    P [place] 37 degrees    QRS Axis 50 degrees    T Wave [place] 53 degrees   ECG 12 lead    Collection Time: 01/04/24  6:44 AM   Result Value Ref Range    Ventricular Rate 63 BPM    Atrial Rate 63 BPM    OH Interval 148 ms    QRSD Interval 78 ms    QT Interval 424 ms    QTC Interval 433 ms    P Axis 34 degrees    QRS Axis 48 degrees    T Wave [place] 53 degrees   ECG 12 lead    Collection Time: 01/04/24  7:01 AM   Result Value Ref Range    Ventricular Rate 67 BPM    Atrial Rate 67 BPM    OH Interval 150 ms    QRSD Interval 78 ms    QT Interval 422 ms    QTC Interval 445 ms    P Axis 41 degrees    QRS Axis 61 degrees    T Wave Axis 63 degrees   ECG 12 lead    Collection Time: 01/04/24  8:07 AM   Result Value Ref Range    Ventricular Rate 66 BPM    Atrial Rate 66 BPM    OH Interval 150 ms    QRSD Interval 78 ms    QT Interval 418 ms    QTC Interval 438 ms    P Axis 41 degrees    QRS Axis 47 degrees    T Wave Axis 51 degrees   ECG 12 lead    Collection Time: 01/04/24  8:14 AM   Result Value Ref Range    Ventricular Rate 69 BPM    Atrial Rate 69 BPM    OH Interval 156 ms    QRSD Interval 78 ms    QT Interval 414 ms    QTC Interval 443 ms    P [place] 39 degrees    QRS Axis 46 degrees    T Wave Axis 47 degrees          Assessment/Plan     Assessment: 33-year-old woman with major depressive disorder, single episode, severe without psychotic features who presents status post intentional overdose on blood pressure medications and psychiatric medications.  Intentional overdose was with attempt to die in the setting of relationship stress and ongoing major  depressive episode.  Given the seriousness of this overdose attempt and her worsening depression, patient requires inpatient psychiatric treatment for stabilization and safety.  We discussed the process for inpatient behavioral health treatment and she agreed to sign a 201 for voluntary inpatient behavioral treatment.  Discussed that a 201 means she is agreeing to voluntarily receive treatment and participate in treatment and does not mean that she is able to discharge whenever she so decides.  Informed her of involuntary commitment process should she change her mind about 201.  Patient understands and expresses understanding of the need for inpatient psychiatric treatment in the setting of serious overdose attempt.  Lexapro may be restarted once medically cleared.  Patient should be referred for inpatient behavioral health once medically stabilized.  Patient currently denies suicidal ideation, intention, or plan.  She is not psychotic and denies AVH.    Diagnosis: Major depressive disorder, single episode, severe, without psychotic features    Recommendations:   --Patient agreed to inpatient behavioral health treatment via 201.  Please have patient sign 201 and referred for inpatient behavioral treatment  --Patient may not leave AMA, if patient requesting to leave AMA, please reconsult or call psychiatry.  In the setting of refusal to attend inpatient behavioral treatment, we will pursue 302 given the seriousness of her overdose attempt  --Restart Lexapro 5 mg daily once medically stable  --Please TigerText with any questions or concerns.    Diagnoses, available treatment options, alternatives to treatment, and risks vs. benefits of current psychiatric treatment plan were discussed with the patient.  Prior records were reviewed in [facility].  The case was discussed with the primary team.    Gordon Chance MD    This note has been constructed using a voice recognition system. There may be translation, syntax, or  grammatical errors. If you have any questions, please contact the dictating provider.

## 2024-01-04 NOTE — DISCHARGE SUMMARY
Discharge Summary - Barbie Garcia 33 y.o. female MRN: [id number]    Unit/Bed#: -01 Encounter: [id number]    Admission Date:   Admission Orders (From admission, onward)       Ordered        01/03/24 1256  INPATIENT ADMISSION  Once            Signed and Held  ED TO DIFFERENT CAMPUS [facility] UNIT or INPATIENT MEDICAL UNIT to [facility] UNIT (using Discharge Readmit Navigator) - Admit Patient to  Behavioral Health Unit  Once                            Admitting Diagnosis: Suicide attempt (HCC) [T14.91XA]  Intentional drug overdose, initial encounter ([facility]) [T50.902A]    HPI: (Per initial HPI by German GARCIA): 33 y.o. who presents after intentional overdose. PMHx of depression recently started escitalopram 5 mg daily on 12/15/23, hypothyroidism, and HTN. Pt sent concerning text to spouse which made him call 911 to check on her. She was found lethargic and brought into ED. Ingestion time about 0900, took confirmed lisinopril 10 mg x 7-10 tabs, propranolol 20 mg x 15 tabs, and diphenhydramine HCL 25 mg x 24 tabs. She may possibly have taken levothyroxine unknown amount. No prior SI hx or attempts.   Pt admitted to SD1 for closer monitoring.        Procedures Performed:   Orders Placed This Encounter   Procedures    Critical Care    ED ECG Documentation Only       Summary of Hospital Course: Pt admitted to Stepdown status after intentional overdose. Toxicology consulted and pt placed on continual observation. Close monitoring of EKGs and mental status for 24 hrs per toxicology's recommendations. Pt had no complications. She was seen by psychiatry and signed a 301 on 1/4/24. Pt was medically cleared and discharged to inpatient behavioral unit on 1/4/24.    Significant Findings, Care, Treatment and Services Provided:  Consult: behavioral health    Complications: none    Discharge Diagnosis:   Principal Problem:    Intentional overdose (HCC)  Active Problems:    Hypothyroidism    Depression        Medical Problems        Resolved Problems  Date Reviewed: 1/4/2024            Resolved    Leukocytosis 1/4/2024     Resolved by  RADHA Arechiga    AMS (altered mental status) 1/4/2024     Resolved by  RADHA Arechiga    Hypokalemia 1/4/2024     Resolved by  Soledad Iniguez PA-C          Condition at Discharge: good         Discharge instructions/Information to patient and family:   See after visit summary for information provided to patient and family.      Provisions for Follow-Up Care:  See after visit summary for information related to follow-up care and any pertinent home health orders.      PCP: RADHA Garcia    Disposition:  Discharge readmit to [facility] I/P psych    Planned Readmission: Yes Today to I/P behavioral health      Discharge Statement   I spent 30 minutes discharging the patient. This time was spent on the day of discharge. I had direct contact with the patient on the day of discharge. Additional documentation is required if more than 30 minutes were spent on discharge.     Discharge Medications:  See after visit summary for reconciled discharge medications provided to patient and family.

## 2024-01-04 NOTE — PROGRESS NOTES
Formerly [facility], [facility]–1983  Progress Note  Name: Barbie Garcia I  MRN: [id number]  Unit/Bed#: -01 SDU I Date of Admission: 1/3/2024   Date of Service: 1/4/2024 I Hospital Day: 1    Assessment/Plan   * Intentional overdose (HCC)  Assessment & Plan  Intentional Overdose 1/3 about 0900: Lisinopril 10 mg x 7 -10 tabs, propranolol 20 mg x 15 tabs, Diphenhydramine HCL 25 mg tabs x 24 tabs. And possibly levothyroxine tabs.   Tox consulted an input appreciated  Serial EKGs Q1-2hr to monitor QRS. If > 120 give IV lidocaine or 1-2 mEq/kg sodium bicarb bolus  Monitor for urinary retention, Wiley placed in ED  Monitor BP and renal function  Seizure precautions.   Give IV diazepam 5-10 mg for seizure, agitation, Fever > 103F, HR > 150 bpm, SBP > 180  1:1 monitoring started  Initial EKG with QRS 84  Continuous telemetry monitoring  Holding home escitalopram which she started recently on 12/15/23      AMS (altered mental status)  Assessment & Plan  Presented to the ED lethargic after intentional OD  MS improving   Continue frequent monitoring  Delirium precautions  Daily CAM-ICU      Depression  Assessment & Plan  Pt reports seeing a psychiatrist as o/p and recently started on escitalopram 5 mg daily on 12/15/23   confirmed that pt has been taking medication daily as prescribed: Script filled 12/15/23 for 30 count and there is only 12 tabs left in the bottle.   Hold home escitalopram for now until re-evaluated by psych   1:1 observation    Leukocytosis  Assessment & Plan  WBC 14   Likely stress related  Trend fever curve and WBC count      Hypothyroidism  Assessment & Plan  Hold home dose, pt may have taken in overdose  Close monitoring per Tox as discussed above             Disposition: Stepdown Level 1    ICU Core Measures     A: Assess, Prevent, and Manage Pain Has pain been assessed? Yes  Need for changes to pain regimen? No   B: Both SAT/SAT  N/A   C: Choice of Sedation RASS Goal: 0 Alert and  Calm or N/A patient not on sedation  Need for changes to sedation or analgesia regimen? NA   D: Delirium CAM-ICU: Negative   E: Early Mobility  Plan for early mobility? Yes   F: Family Engagement Plan for family engagement today? Yes       Review of Invasive Devices:    Dunacn Plan:  duncan placed due to concern for urinary retention. Trial removal today with urinary retention protocol        Prophylaxis:  VTE Contraindicated secondary to: Low Risk   Stress Ulcer  not ordered         Significant 24hr Events     HPI: 33-year-old female with past medical history significant for depression, hypothyroidism, and hypertension who is hospital day 2 status post intentional overdose on propranolol, lisinopril, Benadryl, and possibly levothyroxine.  She is admitted to critical care service for close hemodynamic monitoring and every hour EKGs to monitor QRS.    24-hour events:  Mental status improved.  Patient alert and oriented x 3  QRS max 82 overnight.  Did not require bicarb  No acute events overnight     Subjective   Review of Systems   Constitutional:  Negative for chills and fever.   Respiratory:  Negative for cough and shortness of breath.    Cardiovascular:  Negative for chest pain and palpitations.   Gastrointestinal:  Negative for abdominal distention, abdominal pain and nausea.   Genitourinary:  Negative for dysuria.   Neurological:  Negative for dizziness, weakness, light-headedness and headaches.   All other systems reviewed and are negative.     Objective                            Vitals I/O      Most Recent Min/Max in 24hrs   Temp 98 °F (36.7 °C) Temp  Min: 97.5 °F (36.4 °C)  Max: 98 °F (36.7 °C)   Pulse 60 Pulse  Min: 57  Max: 76   Resp 15 Resp  Min: 14  Max: 22   /60 BP  Min: 93/55  Max: 153/84   O2 Sat 96 % SpO2  Min: 95 %  Max: 99 %      Intake/Output Summary (Last 24 hours) at 1/4/2024 0138  Last data filed at 1/4/2024 0000  Gross per 24 hour   Intake 833.34 ml   Output 2470 ml   Net -1636.66 ml        Diet NPO; Sips of clear liquids    Invasive Monitoring           Physical Exam   Physical Exam  Vitals reviewed.   Eyes:      Extraocular Movements: Extraocular movements intact.      Pupils: Pupils are equal, round, and reactive to light.   Skin:     General: Skin is warm and dry.   HENT:      Head: Normocephalic and atraumatic.      Mouth/Throat:      Mouth: Mucous membranes are moist.   Cardiovascular:      Rate and Rhythm: Normal rate and regular rhythm.      Heart sounds: No murmur heard.     No friction rub. No gallop.   Musculoskeletal:      Right lower leg: No edema.      Left lower leg: No edema.   Abdominal: General: There is no distension.      Palpations: Abdomen is soft.      Tenderness: There is no abdominal tenderness. There is no guarding.      Hernia: No hernia is present.   Constitutional:       Appearance: She is well-developed and well-nourished.   Pulmonary:      Effort: Pulmonary effort is normal. No accessory muscle usage, respiratory distress or accessory muscle usage.      Breath sounds: Normal breath sounds. No wheezing, rhonchi or rales.   Psychiatric:         Mood and Affect: Affect is flat.   Neurological:      General: No focal deficit present.      Mental Status: She is alert and oriented to person, place and time. Mental status is at baseline.      Motor: Strength full and intact in all extremities.            Diagnostic Studies      EKG: NSR  Imaging:  I have personally reviewed pertinent reports.       Medications:  Scheduled PRN   chlorhexidine, 15 mL, Q12H RIKI          Continuous    multi-electrolyte, 100 mL/hr, Last Rate: 100 mL/hr (01/04/24 0126)         Labs:    CBC    Recent Labs     01/03/24  1019 01/03/24  1028   WBC 14.47*  --    HGB 13.8 14.3   HCT 41.1 42   *  --      BMP    Recent Labs     01/03/24  1019 01/03/24  1028   SODIUM 137  --    K 3.6  --      --    CO2 25 25   AGAP 7  --    BUN 13  --    CREATININE 0.70  --    CALCIUM 9.6  --         Coags    Recent Labs     01/03/24  1019   INR 0.89   PTT 29        Additional Electrolytes  Recent Labs     01/03/24  1028   CAIONIZED 1.26          Blood Gas    No recent results  No recent results LFTs  Recent Labs     01/03/24  1019   ALT 12   AST 13   ALKPHOS 91   ALB 4.4   TBILI 0.36       Infectious  No recent results  Glucose  Recent Labs     01/03/24  1019   GLUC 98             Soledad Iniguez PA-C

## 2024-01-04 NOTE — UTILIZATION REVIEW
Initial Clinical Review    Admission: Date/Time/Statement:   Admission Orders (From admission, onward)       Ordered        01/03/24 1256  INPATIENT ADMISSION  Once                          Orders Placed This Encounter   Procedures    INPATIENT ADMISSION     Standing Status:   Standing     Number of Occurrences:   1     Order Specific Question:   Level of Care     Answer:   Critical Care [15]     Order Specific Question:   Estimated length of stay     Answer:   More than 2 Midnights     Order Specific Question:   Certification     Answer:   I certify that inpatient services are medically necessary for this patient for a duration of greater than two midnights. See H&P and MD Progress Notes for additional information about the patient's course of treatment.     ED Arrival Information       Expected   -    Arrival   1/3/2024 10:09    Acuity   Emergent              Means of arrival   Ambulance    Escorted by   [facility]    Service   Critical Care/ICU    Admission type   Emergency              Arrival complaint   -             Chief Complaint   Patient presents with    Suicide Attempt     Pt at [facility], intentional overdose.        Initial Presentation: 33 y.o. female with PMHx: depression, hypothyroidism, HTN, who presented to [facility] ED via EMS due to intentional overdose. Found lethargic by spouse. Ingestion time about 0900, took confirmed lisinopril 10 mg x 7-10 tabs, propranolol 20 mg x 15 tabs, and diphenhydramine HCL 25 mg x 24 tabs. She may possibly have taken levothyroxine unknown amount. No prior SI hx or attempts.  On exam, calm in ED, full strength, Wiley placed.  Given 2 L IVF in ED. Plan:  Admit Inpatient status to ICU dt intentional overdose: 1:1 observation, telemetry, hold home escitalopram, monitor mental status, neuro checks, keep NPO, psychiatry consult, monitor electrolytes and replete prn, toxicology consult.     1/3 Per Med Toxicology: Monitor for urinary retention, seizure  precautions. 5-10 mg IV diazepam for seizures and agitation, SBP > 180, HR > 150, T > 103F. Avoid neuroleptics, anticholinergic agents at this time. Q1-2 EKGs over 24 hours, can space out throughout the day if stable, if QRS > 120, give 1-2 mEq per kg sodium bicarbonate, goal: QRS < 120, titrate to pH 7.55, K > 4.0, if QRS widening is refractory to sodium bicarbonate, please administer IV lidocaine or hypertonic saline. Supportive care including IV fluids as needed.    Date: 1/4   Day 2: Per CC Note:  Patient reports that she was upset at home in setting of personal difficulties.  That she took extra pills that she had at home.  The patient denies any history of suicidal ideation.  At the current time, patient is alert and oriented. No acute events.  Mental status and EKG stable. Continue to hold home psych meds, enc OOB and inc spir, monitor VS and accuchecks. Order regular diet, continue 1:1 observation.     1/4 Per Psychiatry: Patient agreed to inpatient behavioral health treatment via 201. Patient may not leave AMA, if patient requesting to leave AMA, please reconsult or call psychiatry.  In the setting of refusal to attend inpatient behavioral treatment, we will pursue 302 given the seriousness of her overdose attempt. Restart Lexapro 5 mg daily once medically stable    ED Triage Vitals   Temperature Pulse Respirations Blood Pressure SpO2   01/03/24 1035 01/03/24 1015 01/03/24 1015 01/03/24 1015 01/03/24 1015   97.5 °F (36.4 °C) 76 14 140/93 97 %      Temp Source Heart Rate Source Patient Position - Orthostatic VS BP Location FiO2 (%)   01/03/24 1035 01/03/24 1015 01/03/24 1015 01/03/24 1015 --   Axillary Monitor Lying Left arm       Pain Score       01/03/24 1015       No Pain          Wt Readings from Last 1 Encounters:   01/04/24 88.3 kg (194 lb 10.7 oz)     Additional Vital Signs:   Date/Time Temp Pulse Resp BP MAP (mmHg) SpO2 O2 Device Patient Position - Orthostatic VS   01/04/24 0800 -- 72 19 109/66 81 96  % -- --   01/04/24 0700 98.5 °F (36.9 °C) 64 18 108/57 80 96 % None (Room air) Sitting   01/04/24 0600 -- 68 18 113/61 82 96 % -- --   01/04/24 0500 -- 63 16 108/57 76 96 % -- --   01/04/24 0400 -- 65 17 129/60 78 95 % -- --   01/04/24 0300 -- 59 17 103/64 78 95 % -- --   01/04/24 0200 -- 58 16 106/63 79 95 % -- --   01/04/24 0100 -- 60 15 103/60 76 96 % -- --   01/04/24 0000 -- 57 16 93/55 69 95 % -- --   01/03/24 2300 98 °F (36.7 °C) 57 15 97/53 72 96 % None (Room air) Lying   01/03/24 2200 -- 59 15 98/54 70 97 % -- --   01/03/24 2100 -- 61 16 116/62 81 96 % -- --   01/03/24 2000 98 °F (36.7 °C) 61 16 111/71 87 97 % -- --   01/03/24 1900 -- 61 20 130/87 105 99 % -- --   01/03/24 1800 -- 61 15 118/75 93 97 % -- --   01/03/24 1700 -- 68 19 135/76 101 98 % -- --   01/03/24 1600 -- 64 17 134/81 103 99 % -- --   01/03/24 1500 -- 68 18 144/80 105 98 % -- --   01/03/24 1459 97.8 °F (36.6 °C) 65 16 130/76 97 99 % None (Room air) --   01/03/24 1430 -- 68 17 146/81 108 99 % -- --   01/03/24 1345 -- 68 21 153/84 113 97 % -- --   01/03/24 1300 -- 70 22 152/86 110 99 % -- --   01/03/24 1230 -- 68 19 142/86 108 99 % None (Room air) Lying   01/03/24 1200 -- 73 20 137/90 109 97 % -- --   01/03/24 1140 -- -- -- -- -- -- None (Room air) --   01/03/24 1130 -- 71 19 140/85 106 99 % -- --   01/03/24 1115 -- 74 20 133/72 94 96 % -- --   01/03/24 1036 -- -- -- -- -- -- None (Room air) --   01/03/24 1035 97.5 °F (36.4 °C) -- -- -- -- -- -- --   01/03/24 1015 -- 76 14 140/93 -- 97 % None (Room air) Lying     Pertinent Labs/Diagnostic Test Results:   1/3 EKG: NSR    XR chest 1 view portable   ED Interpretation by Marcelino Kerns DO (01/03 1423)   Poor inspiration.  Crowding of vessels.      Final Result by Sofiya Peralta MD (01/03 1413)      Low lung volumes with vascular crowding; no acute disease.               Workstation performed: [id number]           Results from last 7 days   Lab Units 01/03/24  1019   SARS-COV-2  Negative      Results from last 7 days   Lab Units 01/04/24  0503 01/03/24  1028 01/03/24  1019   WBC Thousand/uL 8.67  --  14.47*   HEMOGLOBIN g/dL 12.3  --  13.8   I STAT HEMOGLOBIN g/dl  --  14.3  --    HEMATOCRIT % 37.7  --  41.1   HEMATOCRIT, ISTAT %  --  42  --    PLATELETS Thousands/uL 301  --  397*   NEUTROS ABS Thousands/µL 5.33  --  10.68*         Results from last 7 days   Lab Units 01/04/24  0503 01/03/24  1028 01/03/24  1019   SODIUM mmol/L 139  --  137   POTASSIUM mmol/L 4.0  --  3.6   CHLORIDE mmol/L 108  --  105   CO2 mmol/L 26  --  25   CO2, I-STAT mmol/L  --  25  --    ANION GAP mmol/L 5  --  7   BUN mg/dL 10  --  13   CREATININE mg/dL 0.88  --  0.70   EGFR ml/min/1.73sq m 85  --  112   CALCIUM mg/dL 8.5  --  9.6   CALCIUM, IONIZED mmol/L 1.11*  --   --    CALCIUM, IONIZED, ISTAT mmol/L  --  1.26  --    MAGNESIUM mg/dL 2.0  --   --    PHOSPHORUS mg/dL 3.5  --   --      Results from last 7 days   Lab Units 01/04/24  0503 01/03/24  1019   AST U/L 11* 13   ALT U/L 9 12   ALK PHOS U/L 73 91   TOTAL PROTEIN g/dL 6.0* 7.6   ALBUMIN g/dL 3.5 4.4   TOTAL BILIRUBIN mg/dL 0.60 0.36   BILIRUBIN DIRECT mg/dL  --  0.04     Results from last 7 days   Lab Units 01/03/24  1024   POC GLUCOSE mg/dl 89     Results from last 7 days   Lab Units 01/04/24  0503 01/03/24  1019   GLUCOSE RANDOM mg/dL 79 98     Results from last 7 days   Lab Units 01/03/24  1028   PH, ROBERT I-STAT  7.362   PCO2, ROBERT ISTAT mm HG 42.4   PO2, ROBERT ISTAT mm HG 25.0*   HCO3, ROBERT ISTAT mmol/L 24.0   I STAT BASE EXC mmol/L -1   I STAT O2 SAT % 43*     Results from last 7 days   Lab Units 01/03/24  1019   PROTIME seconds 12.4   INR  0.89   PTT seconds 29     Results from last 7 days   Lab Units 01/03/24  1019   TSH 3RD GENERATON uIU/mL 1.757     Results from last 7 days   Lab Units 01/03/24  1034   CLARITY UA  Clear   COLOR UA  Colorless   SPEC GRAV UA  <1.005*   PH UA  5.5   GLUCOSE UA mg/dl Negative   KETONES UA mg/dl Negative   BLOOD UA  Negative   PROTEIN  UA mg/dl Negative   NITRITE UA  Negative   BILIRUBIN UA  Negative   UROBILINOGEN UA (BE) mg/dl <2.0   LEUKOCYTES UA  Negative     Results from last 7 days   Lab Units 01/03/24  1019   INFLUENZA A PCR  Negative   INFLUENZA B PCR  Negative   RSV PCR  Negative         Results from last 7 days   Lab Units 01/03/24  1034   AMPH/METH  Negative   BARBITURATE UR  Negative   BENZODIAZEPINE UR  Negative   COCAINE UR  Negative   OPIATE UR  Negative   PCP UR  Negative   THC UR  Negative     Results from last 7 days   Lab Units 01/03/24  1019   ETHANOL LVL mg/dL <10   ACETAMINOPHEN LVL ug/mL <2*   SALICYLATE LVL mg/dL <5     ED Treatment:   Medication Administration from 01/03/2024 1008 to 01/03/2024 1451         Date/Time Order Dose Route Action     01/03/2024 1129 EST lactated ringers bolus 2,000 mL 2,000 mL Intravenous New Bag          Past Medical History:   Diagnosis Date    Allergic     Disease of thyroid gland     Hypertension     Hypothyroid     Obesity      Present on Admission:   Hypothyroidism   Intentional overdose ([facility])   Leukocytosis   AMS (altered mental status)   (Resolved) Hypokalemia   Depression      Admitting Diagnosis: Suicide attempt ([facility]) [T14.91XA]  Intentional drug overdose, initial encounter ([facility]) [T50.902A]  Age/Sex: 33 y.o. female  Admission Orders:  Scheduled Medications:  chlorhexidine, 15 mL, Mouth/Throat, Q12H RIKI      Continuous IV Infusions:  multi-electrolyte (PLASMALYTE-A/ISOLYTE-S PH 7.4) IV solution  Rate: 100 mL/hr Dose: 100 mL/hr  Freq: Continuous Route: IV  Last Dose: Stopped (01/04/24 0910)  Start: 01/03/24 1645 End: 01/04/24 0653      PRN Meds:     Scd  Io, daily wts    IP CONSULT TO TOXICOLOGY  IP CONSULT TO CASE MANAGEMENT  IP CONSULT TO PSYCHIATRY    Network Utilization Review Department  ATTENTION: Please call with any questions or concerns to [phone] and carefully listen to the prompts so that you are directed to the right person. All voicemails are confidential.   For  Discharge needs, contact Care Management DC Support Team at [phone] opt. 2  Send all requests for admission clinical reviews, approved or denied determinations and any other requests to dedicated fax number below belonging to the campus where the patient is receiving treatment. List of dedicated fax numbers for the Facilities:  FACILITY NAME UR FAX NUMBER   ADMISSION DENIALS (Administrative/Medical Necessity) [phone]   DISCHARGE SUPPORT TEAM (NETWORK) [phone]   PARENT CHILD HEALTH (Maternity/NICU/Pediatrics) [phone]   [facility] [phone]   [facility] [phone]   [facility] [phone]   [facility] [phone]   [facility] [phone]   [facility] [phone]   [facility] [phone]   [facility] [phone]   [facility] [phone]   [facility] [phone]   [facility] [phone]

## 2024-01-04 NOTE — CASE MANAGEMENT
Case Management Assessment & Discharge Planning Note    Patient name Barbie Garcia  Location  SDU/-01 S* MRN [id number]  : 1990 Date 2024       Current Admission Date: 1/3/2024  Current Admission Diagnosis:Intentional overdose (HCC)   Patient Active Problem List    Diagnosis Date Noted    Intentional overdose (HCC) 2024    Leukocytosis 2024    AMS (altered mental status) 2024    Depression 2024    Encounter for surveillance of contraceptive pills 2018    Hypothyroidism 2017    Bicornuate uterus 2016      LOS (days): 1  Geometric Mean LOS (GMLOS) (days):   Days to GMLOS:     OBJECTIVE:    Risk of Unplanned Readmission Score: 7.43         Current admission status: Inpatient       Preferred Pharmacy:   [id number]  [place] - [address]  [address]  [place] [id number]  Phone: [phone] Fax: [phone]    Primary Care Provider: RADHA Garcia    Primary Insurance: BLUE CROSS  Secondary Insurance:     ASSESSMENT:  Active Health Care Proxies    There are no active Health Care Proxies on file.       Readmission Root Cause  30 Day Readmission: No    Patient Information  Admitted from:: Home  Mental Status: Alert  During Assessment patient was accompanied by: Not accompanied during assessment  Assessment information provided by:: Patient  Primary Caregiver: Self  Support Systems: Self, Spouse/significant other, Children  County of Residence: [place]  What city do you live in?: [place]  Home entry access options. Select all that apply.: Stairs  Type of Current Residence: 2 story home  Upon entering residence, is there a bedroom on the main floor (no further steps)?: No  A bedroom is located on the following floor levels of residence (select all that apply):: 2nd Floor  Upon entering residence, is there a bathroom on the main floor (no further steps)?: No  Indicate which floors of current residence have a  bathroom (select all the apply):: 2nd Floor  Number of steps to 2nd floor from main floor: One Flight  Living Arrangements: Lives w/ Spouse/significant other, Other (Comment) (Lives with 3 children)  Is patient a ?: No    Activities of Daily Living Prior to Admission  Functional Status: Independent  Completes ADLs independently?: Yes  Ambulates independently?: Yes  Does patient use assisted devices?: No  Does patient currently own DME?: No  Does patient have a history of Outpatient Therapy (PT/OT)?: No  Does the patient have a history of Short-Term Rehab?: No  Does patient have a history of HHC?: No  Does patient currently have HHC?: No    Patient Information Continued  Income Source: Employed  Does patient have prescription coverage?: Yes  Does patient receive dialysis treatments?: No  Does patient have a history of substance abuse?: No  Does patient have a history of Mental Health Diagnosis?: Yes (depression)  Has patient received inpatient treatment related to mental health in the last 2 years?: No    Means of Transportation  Means of Transport to Appts:: Drives Self      Housing Stability: Low Risk  (1/4/2024)    Housing Stability Vital Sign     Unable to Pay for Housing in the Last Year: No     Number of Places Lived in the Last Year: 1     Unstable Housing in the Last Year: No   Food Insecurity: No Food Insecurity (1/4/2024)    Hunger Vital Sign     Worried About Running Out of Food in the Last Year: Never true     Ran Out of Food in the Last Year: Never true   Transportation Needs: No Transportation Needs (1/4/2024)    PRAPARE - Transportation     Lack of Transportation (Medical): No     Lack of Transportation (Non-Medical): No   Utilities: Not At Risk (1/4/2024)    [facility] Utilities     Threatened with loss of utilities: No       DISCHARGE DETAILS:  Additional Comments: Met with Pt. Pt presents AA&Ox3. Discussed role of case management. Pt lives with  and 3 children in 2sh, small steps to enter.  Independent PTA. Drives/grocery shooping. Denies hx of VNA/SNF/MH/D&A. Pt reports she just started talking to psychiatrist 2 weeks ago. Pt agreeble for inpt mental health treatment upon discharge. Will need to make referrals to inpt mental health facilities and obtain insurance auth prior to discharge. CM to follow.

## 2024-01-04 NOTE — QUICK NOTE
Discussed with RADHA Cardona.    Patient is doing well this morning. No signs/symptoms of toxidrome or encephalopathy. Vital signs have been stable without hypotension. EKGs show stable QRS and Qtc.    No further recommendations from a toxicology perspective.    Thank you for the consult. Please contact the medical  on call via [employer] with questions or concerns.

## 2024-01-05 PROBLEM — Z00.8 MEDICAL CLEARANCE FOR PSYCHIATRIC ADMISSION: Status: ACTIVE | Noted: 2024-01-05

## 2024-01-05 PROBLEM — F12.10 MILD TETRAHYDROCANNABINOL (THC) ABUSE: Status: ACTIVE | Noted: 2024-01-05

## 2024-01-05 PROBLEM — F33.2 SEVERE EPISODE OF RECURRENT MAJOR DEPRESSIVE DISORDER, WITHOUT PSYCHOTIC FEATURES (HCC): Status: ACTIVE | Noted: 2024-01-05

## 2024-01-05 PROBLEM — E66.9 OBESITY (BMI 30-39.9): Status: ACTIVE | Noted: 2024-01-05

## 2024-01-05 LAB
25(OH)D3 SERPL-MCNC: 23.5 NG/ML (ref 30–100)
ALBUMIN SERPL BCP-MCNC: 3.9 G/DL (ref 3.5–5)
ALP SERPL-CCNC: 86 U/L (ref 34–104)
ALT SERPL W P-5'-P-CCNC: 10 U/L (ref 7–52)
ANION GAP SERPL CALCULATED.3IONS-SCNC: 7 MMOL/L
AST SERPL W P-5'-P-CCNC: 13 U/L (ref 13–39)
ATRIAL RATE: 54 BPM
ATRIAL RATE: 57 BPM
ATRIAL RATE: 58 BPM
ATRIAL RATE: 61 BPM
ATRIAL RATE: 62 BPM
ATRIAL RATE: 62 BPM
ATRIAL RATE: 63 BPM
ATRIAL RATE: 65 BPM
ATRIAL RATE: 66 BPM
ATRIAL RATE: 67 BPM
ATRIAL RATE: 69 BPM
ATRIAL RATE: 85 BPM
BASOPHILS # BLD AUTO: 0.02 THOUSANDS/ÂΜL (ref 0–0.1)
BASOPHILS NFR BLD AUTO: 0 % (ref 0–1)
BILIRUB SERPL-MCNC: 0.41 MG/DL (ref 0.2–1)
BUN SERPL-MCNC: 10 MG/DL (ref 5–25)
CALCIUM SERPL-MCNC: 9.1 MG/DL (ref 8.4–10.2)
CHLORIDE SERPL-SCNC: 107 MMOL/L (ref 96–108)
CHOLEST SERPL-MCNC: 158 MG/DL
CO2 SERPL-SCNC: 25 MMOL/L (ref 21–32)
CREAT SERPL-MCNC: 0.75 MG/DL (ref 0.6–1.3)
EOSINOPHIL # BLD AUTO: 0.17 THOUSAND/ÂΜL (ref 0–0.61)
EOSINOPHIL NFR BLD AUTO: 2 % (ref 0–6)
ERYTHROCYTE [DISTWIDTH] IN BLOOD BY AUTOMATED COUNT: 12.7 % (ref 11.6–15.1)
EST. AVERAGE GLUCOSE BLD GHB EST-MCNC: 111 MG/DL
GFR SERPL CREATININE-BSD FRML MDRD: 103 ML/MIN/1.73SQ M
GLUCOSE P FAST SERPL-MCNC: 78 MG/DL (ref 65–99)
GLUCOSE SERPL-MCNC: 78 MG/DL (ref 65–140)
HBA1C MFR BLD: 5.5 %
HCG SERPL QL: NEGATIVE
HCT VFR BLD AUTO: 40.7 % (ref 34.8–46.1)
HDLC SERPL-MCNC: 41 MG/DL
HGB BLD-MCNC: 13.3 G/DL (ref 11.5–15.4)
IMM GRANULOCYTES # BLD AUTO: 0.05 THOUSAND/UL (ref 0–0.2)
IMM GRANULOCYTES NFR BLD AUTO: 1 % (ref 0–2)
LDLC SERPL CALC-MCNC: 99 MG/DL (ref 0–100)
LYMPHOCYTES # BLD AUTO: 2.23 THOUSANDS/ÂΜL (ref 0.6–4.47)
LYMPHOCYTES NFR BLD AUTO: 24 % (ref 14–44)
MCH RBC QN AUTO: 31.4 PG (ref 26.8–34.3)
MCHC RBC AUTO-ENTMCNC: 32.7 G/DL (ref 31.4–37.4)
MCV RBC AUTO: 96 FL (ref 82–98)
MONOCYTES # BLD AUTO: 0.52 THOUSAND/ÂΜL (ref 0.17–1.22)
MONOCYTES NFR BLD AUTO: 6 % (ref 4–12)
NEUTROPHILS # BLD AUTO: 6.39 THOUSANDS/ÂΜL (ref 1.85–7.62)
NEUTS SEG NFR BLD AUTO: 67 % (ref 43–75)
NONHDLC SERPL-MCNC: 117 MG/DL
NRBC BLD AUTO-RTO: 0 /100 WBCS
P AXIS: 17 DEGREES
P AXIS: 34 DEGREES
P AXIS: 37 DEGREES
P AXIS: 37 DEGREES
P AXIS: 38 DEGREES
P AXIS: 39 DEGREES
P AXIS: 40 DEGREES
P AXIS: 41 DEGREES
P AXIS: 41 DEGREES
P AXIS: 44 DEGREES
P AXIS: 44 DEGREES
PLATELET # BLD AUTO: 331 THOUSANDS/UL (ref 149–390)
PMV BLD AUTO: 11.8 FL (ref 8.9–12.7)
POTASSIUM SERPL-SCNC: 4.2 MMOL/L (ref 3.5–5.3)
PR INTERVAL: 148 MS
PR INTERVAL: 150 MS
PR INTERVAL: 152 MS
PR INTERVAL: 154 MS
PR INTERVAL: 156 MS
PR INTERVAL: 156 MS
PR INTERVAL: 158 MS
PR INTERVAL: 158 MS
PR INTERVAL: 160 MS
PR INTERVAL: 160 MS
PROT SERPL-MCNC: 6.7 G/DL (ref 6.4–8.4)
QRS AXIS: 46 DEGREES
QRS AXIS: 46 DEGREES
QRS AXIS: 47 DEGREES
QRS AXIS: 47 DEGREES
QRS AXIS: 48 DEGREES
QRS AXIS: 50 DEGREES
QRS AXIS: 50 DEGREES
QRS AXIS: 51 DEGREES
QRS AXIS: 54 DEGREES
QRS AXIS: 55 DEGREES
QRS AXIS: 56 DEGREES
QRS AXIS: 59 DEGREES
QRS AXIS: 61 DEGREES
QRSD INTERVAL: 78 MS
QRSD INTERVAL: 80 MS
QRSD INTERVAL: 82 MS
QT INTERVAL: 374 MS
QT INTERVAL: 414 MS
QT INTERVAL: 416 MS
QT INTERVAL: 418 MS
QT INTERVAL: 420 MS
QT INTERVAL: 422 MS
QT INTERVAL: 424 MS
QT INTERVAL: 426 MS
QT INTERVAL: 432 MS
QT INTERVAL: 444 MS
QT INTERVAL: 444 MS
QT INTERVAL: 446 MS
QT INTERVAL: 446 MS
QT INTERVAL: 452 MS
QT INTERVAL: 460 MS
QTC INTERVAL: 422 MS
QTC INTERVAL: 428 MS
QTC INTERVAL: 429 MS
QTC INTERVAL: 432 MS
QTC INTERVAL: 433 MS
QTC INTERVAL: 435 MS
QTC INTERVAL: 438 MS
QTC INTERVAL: 438 MS
QTC INTERVAL: 443 MS
QTC INTERVAL: 445 MS
QTC INTERVAL: 445 MS
QTC INTERVAL: 448 MS
QTC INTERVAL: 451 MS
QTC INTERVAL: 454 MS
QTC INTERVAL: 463 MS
RBC # BLD AUTO: 4.23 MILLION/UL (ref 3.81–5.12)
SODIUM SERPL-SCNC: 139 MMOL/L (ref 135–147)
T WAVE AXIS: 47 DEGREES
T WAVE AXIS: 47 DEGREES
T WAVE AXIS: 49 DEGREES
T WAVE AXIS: 49 DEGREES
T WAVE AXIS: 50 DEGREES
T WAVE AXIS: 51 DEGREES
T WAVE AXIS: 52 DEGREES
T WAVE AXIS: 52 DEGREES
T WAVE AXIS: 53 DEGREES
T WAVE AXIS: 55 DEGREES
T WAVE AXIS: 56 DEGREES
T WAVE AXIS: 61 DEGREES
T WAVE AXIS: 63 DEGREES
TREPONEMA PALLIDUM IGG+IGM AB [PRESENCE] IN SERUM OR PLASMA BY IMMUNOASSAY: NORMAL
TRIGL SERPL-MCNC: 89 MG/DL
TSH SERPL DL<=0.05 MIU/L-ACNC: 2.48 UIU/ML (ref 0.45–4.5)
VENTRICULAR RATE: 54 BPM
VENTRICULAR RATE: 57 BPM
VENTRICULAR RATE: 58 BPM
VENTRICULAR RATE: 61 BPM
VENTRICULAR RATE: 62 BPM
VENTRICULAR RATE: 62 BPM
VENTRICULAR RATE: 63 BPM
VENTRICULAR RATE: 65 BPM
VENTRICULAR RATE: 66 BPM
VENTRICULAR RATE: 67 BPM
VENTRICULAR RATE: 69 BPM
VENTRICULAR RATE: 85 BPM
WBC # BLD AUTO: 9.38 THOUSAND/UL (ref 4.31–10.16)

## 2024-01-05 PROCEDURE — 80061 LIPID PANEL: CPT | Performed by: PHYSICIAN ASSISTANT

## 2024-01-05 PROCEDURE — 82306 VITAMIN D 25 HYDROXY: CPT | Performed by: PHYSICIAN ASSISTANT

## 2024-01-05 PROCEDURE — 99223 1ST HOSP IP/OBS HIGH 75: CPT | Performed by: PSYCHIATRY & NEUROLOGY

## 2024-01-05 PROCEDURE — 83036 HEMOGLOBIN GLYCOSYLATED A1C: CPT | Performed by: PHYSICIAN ASSISTANT

## 2024-01-05 PROCEDURE — 84703 CHORIONIC GONADOTROPIN ASSAY: CPT | Performed by: PHYSICIAN ASSISTANT

## 2024-01-05 PROCEDURE — 85025 COMPLETE CBC W/AUTO DIFF WBC: CPT | Performed by: PHYSICIAN ASSISTANT

## 2024-01-05 PROCEDURE — 84443 ASSAY THYROID STIM HORMONE: CPT | Performed by: PHYSICIAN ASSISTANT

## 2024-01-05 PROCEDURE — 80053 COMPREHEN METABOLIC PANEL: CPT | Performed by: PHYSICIAN ASSISTANT

## 2024-01-05 PROCEDURE — 86780 TREPONEMA PALLIDUM: CPT | Performed by: PHYSICIAN ASSISTANT

## 2024-01-05 PROCEDURE — 99253 IP/OBS CNSLTJ NEW/EST LOW 45: CPT | Performed by: PHYSICIAN ASSISTANT

## 2024-01-05 RX ORDER — LEVOTHYROXINE SODIUM 0.03 MG/1
50 TABLET ORAL
Status: DISCONTINUED | OUTPATIENT
Start: 2024-01-06 | End: 2024-01-09 | Stop reason: HOSPADM

## 2024-01-05 RX ORDER — ESCITALOPRAM OXALATE 10 MG/1
10 TABLET ORAL DAILY
Status: DISCONTINUED | OUTPATIENT
Start: 2024-01-05 | End: 2024-01-09 | Stop reason: HOSPADM

## 2024-01-05 RX ADMIN — ESCITALOPRAM OXALATE 10 MG: 10 TABLET ORAL at 13:30

## 2024-01-05 NOTE — PLAN OF CARE
Problem: DISCHARGE PLANNING  Goal: Discharge to home or other facility with appropriate resources  Description: INTERVENTIONS:  - Identify barriers to discharge w/patient and caregiver  - Arrange for needed discharge resources and transportation as appropriate  - Identify discharge learning needs (meds, wound care, etc.)  - Arrange for interpretive services to assist at discharge as needed  - Refer to Case Management Department for coordinating discharge planning if the patient needs post-hospital services based on physician/advanced practitioner order or complex needs related to functional status, cognitive ability, or social support system  Outcome: Progressing  Note: Pt met with CM to review and signed ROIs and complete intake. Pt read and signed treatment plan.

## 2024-01-05 NOTE — ASSESSMENT & PLAN NOTE
Initially presented to [facility] due to intentional drug overdose requiring medical admission  Cleared medically for transfer to [facility]  Further plan per psychiatry

## 2024-01-05 NOTE — PROGRESS NOTES
01/05/24 0830   Team Meeting   Meeting Type Daily Rounds   Team Members Present   Team Members Present Physician;;Nurse;Other (Discipline and Name)   Physician Team Member Dr. Busby / Dr. Noriega / JENNA Sahni / PA Student   Nursing Team Member Regan / Shine   Care Management Team Member Doreen / Caroline / Teagan   Other (Discipline and Name) Paradise (Group Facilitator)   Patient/Family Present   Patient Present No   Patient's Family Present No       Status: Pt is a [facility] from [facility] with an SA via OD on Ploymed (lisinopril, Propranolol, and sleeping aid on 1/3/2024. Pt is going through a divorce and her spouse is taking the children on a vacation with the spouses new partner. Pt is on her first admission and SA attempt. Pt is denying SI, HI, and AVH on the unit. Pt slept through the night.   Readmit score: 16(yellow), AUDIT: 0, PAWSS: 0, BAT: N/a, UDS: Negative    Medication: No medication changes and no PRNs given.     D/C: No discharge date.

## 2024-01-05 NOTE — PLAN OF CARE
Problem: SELF HARM/SUICIDALITY  Goal: Will have no self-injury during hospital stay  Description: INTERVENTIONS:  - Q 15 MINUTES: Routine safety checks  - Q WAKING SHIFT & PRN: Assess risk to determine if routine checks are adequate to maintain patient safety  - Encourage patient to participate actively in care by formulating a plan to combat response to suicidal ideation, identify supports and resources  Outcome: Progressing     Problem: DEPRESSION  Goal: Will be euthymic at discharge  Description: INTERVENTIONS:  - Administer medication as ordered  - Provide emotional support via 1:1 interaction with staff  - Encourage involvement in milieu/groups/activities  - Monitor for social isolation  Outcome: Progressing     Problem: ANXIETY  Goal: Will report anxiety at manageable levels  Description: INTERVENTIONS:  - Administer medication as ordered  - Teach and encourage coping skills  - Provide emotional support  - Assess patient/family for anxiety and ability to cope  Outcome: Progressing  Goal: By discharge: Patient will verbalize 2 strategies to deal with anxiety  Description: Interventions:  - Identify any obvious source/trigger to anxiety  - Staff will assist patient in applying identified coping technique/skills  - Encourage attendance of scheduled groups and activities  Outcome: Progressing     Problem: PAIN - ADULT  Goal: Verbalizes/displays adequate comfort level or baseline comfort level  Description: Interventions:  - Encourage patient to monitor pain and request assistance  - Assess pain using appropriate pain scale  - Administer analgesics based on type and severity of pain and evaluate response  - Implement non-pharmacological measures as appropriate and evaluate response  - Consider cultural and social influences on pain and pain management  - Notify physician/advanced practitioner if interventions unsuccessful or patient reports new pain  Outcome: Progressing     Problem: DISCHARGE PLANNING  Goal:  Discharge to home or other facility with appropriate resources  Description: INTERVENTIONS:  - Identify barriers to discharge w/patient and caregiver  - Arrange for needed discharge resources and transportation as appropriate  - Identify discharge learning needs (meds, wound care, etc.)  - Arrange for interpretive services to assist at discharge as needed  - Refer to Case Management Department for coordinating discharge planning if the patient needs post-hospital services based on physician/advanced practitioner order or complex needs related to functional status, cognitive ability, or social support system  Outcome: Progressing

## 2024-01-05 NOTE — CMS CERTIFICATION NOTE
Recertification: Based upon physical, mental and social evaluations, I certify that inpatient psychiatric services continue to be medically necessary for this patient for a duration of 7 midnights for the treatment of  Severe episode of recurrent major depressive disorder, without psychotic features (HCC) Available alternative community resources still do not meet the patient's mental health care needs. I further attest that an established written individualized plan of care has been updated and is outlined in the patient's medical records.

## 2024-01-05 NOTE — TREATMENT PLAN
TREATMENT PLAN REVIEW - Behavioral Health Barbie Garcia 33 y.o. 1990 female MRN: [id number]    [facility] QU IP BEHAVIORAL HLTH Room / Bed: [facility] 214/[facility] 214-02 Encounter: [id number]          Admit Date/Time:  1/4/2024  9:41 PM    Treatment Team:   DO Yeison Vides, JENNA Babin EdwardDALILA Abraham, DALILA Shields, DALILA Matta, DALILA Acharya LPN    Diagnosis: Principal Problem:    Severe episode of recurrent major depressive disorder, without psychotic features (HCC)  Active Problems:    Mild tetrahydrocannabinol (THC) abuse      Patient Strengths/Assets: ability for insight    Patient Barriers/Limitations: poor past treatment response    Short Term Goals: decrease in depressive symptoms, decrease in self abusive behaviors    Long Term Goals: improvement in depression, free of suicidal thoughts    Progress Towards Goals: starting psychiatric medications as prescribed, improving gradually    Recommended Treatment: medication management, patient medication education, group therapy, milieu therapy, continued Behavioral Health psychiatric evaluation/assessment process    Treatment Frequency: daily medication monitoring, group and milieu therapy daily, monitoring through interdisciplinary rounds, monitoring through weekly patient care conferences    Expected Discharge Date:  4-7 days    Discharge Plan: discharge to home    Treatment Plan Created/Updated By: Asim Noriega DO

## 2024-01-05 NOTE — CASE MANAGEMENT
Confirmed Address:    [facility]: [address] [place] [id number]    [facility]   Resides in the home with:   Pt stated that she lives with her  and three children.    Will Return Home at Discharge:   Pt stated that she can return.    Confirmed Phone Number:   [phone]   Confirmed Email Address:   [email or web address]    Marital Status:  Children: Pt stated that she is  and has three children.    Family/Social Supports:      History of Mental Health:  Pt stated that she has friends through work and neighbors. Pt stated that he  is her family since she hasn't spoke to hers in 15 years.     Pt stated that her dad had depression.   Commitment Status:    Status Changes:  201   Admitted from:   [facility] C/O:         Pt stated that she OD on multiple medication. Pt stated that she had a lot building up. Pt stated that her and her  are  and things are moving fast than she would like. Pt stated that she is also seeing someone else. Pt stated that they are planning on staying in the house for now and her  will move in with his girlfriend eventually.      Past Inpatient Tx:   Pt stated that none.    Past Suicide Attempts:   Pt stated she had an attempt when she was 11 y.o.   Current outpatient:    Psychiatrist:   Pt stated that she has [facility] through [facility]. Pt stated that she has follow up already scheduled.    Therapist:   Pt stated that she has [facility]OP through [facility]. Pt stated that she has follow up already scheduled.    ACT/ICM/CPS/WRT/SC:   N/a   PCP:   Pt stated that she goes to [facility] on [address]    Med Hx/Concern:   Pt stated she has high blood pressure and hyperthyroidism.    Medications:   Pt stated that she started in December and they are talking about increasing her Lexapro.    Pharmacy:   Pt stated that she uses [facility] in [place].    Spirituality/Mormonism:   Pt stated none.    Education:   Pt stated she graduated high school.     Work/Income:   Pt stated that she works overnights at [employer]. Pt stated that her  is handling her leave paperwork.      Legal:     Probation/[place] Ofc: Pt stated none.    Access to Firearms:   Pt stated none.    Transportation:   Pt stated she drives herself.    Strength:   Pt stated that she is good at taking care of others.    Coping Skills:   Pt stated that she listens to music and does art.    Goal:   Pt stated that she wants to stabilize her mood.    Referrals Needed:     N/a   Transport at Discharge:   Pt stated her  can tish her.    Emergency Contact:    Perez Lujan (spouse): [phone]   ROIs obtained:        Insurance:    Blue Cross   Audit: 0 PAWSS: 0  BAT: N/a UDS: Negative   Substance Abuse: Freq. Amount Last Use Notes:   Heroin    N/a   Amp/Meth    N/a   Alcohol    N/a   Cocaine    N/a   Cannabis Occasional  Minimal 1/2/2024    Benzodiazepine       Barbituartes       Other       Tobacco         Pt reviewed and signed treatment plan.

## 2024-01-05 NOTE — H&P
Psychiatric Evaluation - Behavioral Health Justine ValenteLaurelton 33 y.o. female MRN: [id number]  Unit/Bed#: [facility] 214-02 Encounter: [id number]    Assessment/Plan   Principal Problem:    Severe episode of recurrent major depressive disorder, without psychotic features (HCC)  Active Problems:    Mild tetrahydrocannabinol (THC) abuse      Plan:   Increase Lexapro to 10 mg p.o. daily  Admit to [facility] inpatient psychiatry.  Medical management per SLIM.  Check admission labs: CMP, CBC, TSH, RPR, UDS, Lipid Panel, A1c.  Collaborate with case management for baseline assessment and disposition planning.  Chart reviewed and case discussed with treatment team.  Start/continue the following medications:  Current Facility-Administered Medications   Medication Dose Route Frequency Provider Last Rate    acetaminophen  650 mg Oral Q6H PRN Oscar Sahni, PA-MAZIN      acetaminophen  650 mg Oral Q4H PRN Oscar Sahni, PA-MAZIN      acetaminophen  975 mg Oral Q6H PRN Oscar Sahni, PA-MAZIN      aluminum-magnesium hydroxide-simethicone  30 mL Oral Q4H PRN Oscar Sahni, PA-MAZIN      artificial tear   Both Eyes 4x Daily PRN Oscar Sahni, PA-MAZIN      benztropine  1 mg Intramuscular BID PRN Oscar aShni, PA-MAZIN      benztropine  1 mg Oral BID PRN Oscar Sahni, PA-MAZIN      bisacodyl  10 mg Rectal Daily PRN Oscar Sahni, PA-C      hydrOXYzine HCL  50 mg Oral Q6H PRN Max 4/day SERGIO Patterson-MAZIN      Or    diphenhydrAMINE  50 mg Intramuscular Q6H PRN Oscar Sahni, PA-MAZIN      escitalopram  10 mg Oral Daily Asim Noriega, DO      hydrOXYzine HCL  100 mg Oral Q6H PRN Max 4/day SERGIO Patterson-MAZIN      Or    LORazepam  2 mg Intramuscular Q6H PRN Oscar Sahni, PA-MAZIN      hydrOXYzine HCL  25 mg Oral Q6H PRN Max 4/day Oscar Sahni, PA-C      magnesium hydroxide  30 mL Oral Daily PRN Oscar Sahni, PA-C      OLANZapine  10 mg Oral Q3H PRN Max 3/day Oscar Sahni, PA-MAZIN      Or    OLANZapine  10 mg Intramuscular  Q3H PRN Max 3/day Oscar Sahni PA-C      OLANZapine  5 mg Oral Q3H PRN Max 6/day Oscar Sahni PA-C      Or    OLANZapine  5 mg Intramuscular Q3H PRN Max 6/day Oscar Sahni PA-C      OLANZapine  2.5 mg Oral Q3H PRN Max 8/day Oscar Sahni PA-C      senna-docusate sodium  1 tablet Oral Daily PRN Oscar Sahni PA-C      traZODone  50 mg Oral HS PRN Oscar Sahni PA-C         Treatment options and alternatives were reviewed with the patient, who concurs with the above plan. Risks, benefits, and possible side effects of medications were explained to the patient, and she verbalizes understanding.      -----------------------------------    Chief Complaint: SI, overdose attempt    History of Present Illness     Per Dr. Chance note:  Per ED Physician: 33-year-old female with history of hypothyroidism, hypertension and obesity was brought by ambulance from a van after suicide attempts by intentional drug ingestion.  She ingested propranolol 20 mg #30; diphenhydramine 25 mg # up to 24 and possibly lisinopril 10 mg quantity unknown.  She is sleepy.  Able answer some questions and follow commands but has slurred speech.  Closes her eyes when not being asked.  Pulse ox stable at this time with elevated blood pressure.      Per Crisis Worker: Officer Jasen Clemens () filed a [facility] 302. Crisis confirmed with [facility] Delegate (Pritesh Baugh) to utilize the 302 was a back up incase the patient does not want to sign in once medically cleared. The 302 should be filed it patient attempts to leave the hospital or declines IP treatment on a 201. The copy of the suicide note is attached to the back up 302. Officer Sarath states that he spoke with patients  who will take care of patient's car and their children.      302 statement: I responded to [place] for a reported possible suicidal subject. Upon arrival, Barbie  was with EMS on the [facility] getting ready to be taken to the  hospital for an apparent overdose. Open pill packets and bottles were found in her car. I also found a suicide note (attached).     On evaluation,     Barbie was calm and cooperative with interview.  She appeared dysphoric in  affect, exhibited linear and logical thought process.  She reports that she has been feeling depressed for the past several months along with poor sleep, difficulty concentrating.  Depressed mood more than half the time, worsening over the past several months in the context of life stressors including financial stress and a pending divorce with her soon-to-be ex- having a new relationship.  Reports that she was feeling particularly stressed about her relationship with her  and felt hopeless and as though no one wanted her around.  Reports that she took the rest of her blood pressure and psychiatric medications in an attempt to kill herself.  Reports that she was hoping she would not wake up.  Is unsure how she is feeling that she survived.  Reports that at this time she does not feel suicidal and does not have any suicidal ideation, intention, or plan.  She reports that she has not had suicidal thoughts in many years since she was a teenager when she took an intentional overdose of medications.  Reports that she was not hospitalized at that time and has not been hospitalized for psychiatric reasons in the past.  Recently started treatment with a new psychotherapist and psychiatrist several weeks ago and was started on Lexapro and propranolol for depression and anxiety respectively.  Reports that she felt the medications were helping but the stress of her relationship was too much for her to cope with.  Reports that she does have some positive coping mechanisms in the form of going for a walk and engaging with friends at work.  Acknowledges that her depression and anxiety are a problem and has good insight into the fact that she requires further help for her depression.  We  "discussed the need for inpatient psychiatric treatment in the setting of worsening depression and suicide attempt.  She expressed understanding of the process and agreed to sign a voluntary 201 commitment for inpatient behavioral health treatment.  Denies SI/HI/AVH at this time.    On admission to Inpatient Psychiatric Unit:  Patient is a 33-year-old white female with a history of major depressive disorder, recurrent, severe, without psychotic features, and mild THC abuse, presenting voluntarily to inpatient BHU following an overdose attempt on blood pressure medication.    Symptoms prior to hospitalization include: Depressed mood, suicidal ideation with the aforementioned overdose attempt, decreased sleep, decreased concentration, hopelessness, helplessness, and fluctuating appetite.  Symptom severity is rated as severe.  Timeline is progressively worsening over the past few weeks.  Mitigating factors are none.  Exacerbating stressors are going through a divorce with her  but still living with him.    On initial psychiatric evaluation today, the patient endorses the aforementioned and above describes depressive symptomatology.  She states that her divorce is proceeding \"too quickly\" and the relationship stress is her chief stressor.  She was seeing a therapist/prescriber through a virtual [facility] and was started on Lexapro 5 mg with unknown effect.  We discussed that she should undergo a full trial of Lexapro before determining that she \"failed\" this medication, and she is agreeable to increasing the dose.  She denies manic or psychotic symptoms.  She denies other acute psychiatric concerns at this time.    Psychiatric Review Of Systems:  Medication side effects: none  Sleep: Decreased   appetite: Fluctuating   hygiene: able to tend to instrumental and basic ADLs  Anxiety Symptoms: denies  Psychotic Symptoms: denies  Depression Symptoms: Per HPI  Manic Symptoms: denies  PTSD Symptoms: denies  Suicidal Thoughts: " Per HPI  Homicidal Thoughts: denies    Medical Review Of Systems:   Patient denies headache or dizziness.   Patient denies chest pain or palpitations.  Patient denies difficulty breathing or wheezing.  Patient denies nausea, vomiting, or diarrhea.  Patient denies polyuria or polydipsia.  Patient denies weakness or numbness.  Pertinent positives as per HPI.    Historical Information     Psychiatric History:   Diagnoses: MDD, THC abuse  Inpatient Hx: None  Outpatient Hx: Has an outpatient provider through a [facility]  Medications/Trials: Lexapro and propranolol as needed    Substance Abuse History:  Social History     Substance and Sexual Activity   Alcohol Use No     Social History     Substance and Sexual Activity   Drug Use No       I spent time with Barbie in counseling and education on risk of substance abuse. I assessed motivation and encouraged her for treatment as appropriate.     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  No legal or  history    Rest of social history as per below:    Social History     Socioeconomic History    Marital status: /Civil Union     Spouse name: Joey    Number of children: 3    Years of education: Not on file    Highest education level: Not on file   Occupational History    Occupation: [employer]     Employer: [employer]   Tobacco Use    Smoking status: Former     Current packs/day: 0.00     Average packs/day: 0.3 packs/day for 1 year (0.3 ttl pk-yrs)     Types: Cigarettes     Start date:      Quit date:      Years since quittin.0    Smokeless tobacco: Never    Tobacco  "comments:     Only smoked 1-2 cigs per day at most   Vaping Use    Vaping status: Never Used   Substance and Sexual Activity    Alcohol use: No    Drug use: No    Sexual activity: Yes     Partners: Male     Birth control/protection: OCP   Other Topics Concern    Not on file   Social History Narrative    Always uses seat belt    Caffeine use    No Confucianist beliefs     Social Determinants of Health     Financial Resource Strain: Not on file   Food Insecurity: No Food Insecurity (1/4/2024)    Hunger Vital Sign     Worried About Running Out of Food in the Last Year: Never true     Ran Out of Food in the Last Year: Never true   Transportation Needs: No Transportation Needs (1/4/2024)    PRAPARE - Transportation     Lack of Transportation (Medical): No     Lack of Transportation (Non-Medical): No   Physical Activity: Not on file   Stress: Not on file   Social Connections: Not on file   Intimate Partner Violence: Not on file   Housing Stability: Low Risk  (1/4/2024)    Housing Stability Vital Sign     Unable to Pay for Housing in the Last Year: No     Number of Places Lived in the Last Year: 1     Unstable Housing in the Last Year: No       Past Medical History:   Past Medical History:   Diagnosis Date    Allergic     Anxiety     Depression     Disease of thyroid gland     Hypertension     Hypothyroid     Obesity         -----------------------------------  Objective    Temp:  [97.6 °F (36.4 °C)-98.5 °F (36.9 °C)] 98.2 °F (36.8 °C)  HR:  [79-98] 79  Resp:  [18-20] 18  BP: ()/(50-97) 133/88    Mental Status Evaluation:  Appearance: casually dressed, consistent with stated age  Motor: +psychomotor retardation, no gait abnormalities  Behavior: cooperative, answers questions appropriately  Speech: soft, normal rhythm  Mood: \" Depressed\"  Affect: constricted, depressed-appearing  Thought Process: linear and goal-oriented  Thought Content: denies auditory hallucinations, denies visual hallucinations, denies " delusions  Risk Potential: denies current suicidal ideation, plan, or intent. Denies homicidal ideation  Sensorium: Oriented to person, place, time, and situation  Cognition: cognitive ability appears intact but was not quantitatively tested  Consciousness: alert and awake  Attention: intact, able to focus without difficulty  Insight: fair  Judgement: limited      Meds/Allergies   No Known Allergies      Behavioral Health Medications: all current active meds have been reviewed as per above. Changes as per above. Risks, benefits, indications, and alternatives to treatment were discussed with patient.     Laboratory results:  I have personally reviewed all pertinent laboratory/tests results.  Recent Results (from the past 48 hour(s))   ECG 12 lead    Collection Time: 01/03/24  2:06 PM   Result Value Ref Range    Ventricular Rate 73 BPM    Atrial Rate 73 BPM    ND Interval 160 ms    QRSD Interval 80 ms    QT Interval 394 ms    QTC Interval 434 ms    P Axis 34 degrees    QRS Axis 56 degrees    T Wave Axis 45 degrees   ECG 12 lead    Collection Time: 01/03/24  3:04 PM   Result Value Ref Range    Ventricular Rate 68 BPM    Atrial Rate 68 BPM    ND Interval 156 ms    QRSD Interval 80 ms    QT Interval 416 ms    QTC Interval 442 ms    P [place] 32 degrees    QRS Axis 58 degrees    T Wave Axis 52 degrees   ECG 12 lead    Collection Time: 01/03/24  4:14 PM   Result Value Ref Range    Ventricular Rate 63 BPM    Atrial Rate 63 BPM    ND Interval 160 ms    QRSD Interval 80 ms    QT Interval 426 ms    QTC Interval 435 ms    P Axis 39 degrees    QRS Axis 51 degrees    T Wave Axis 47 degrees   ECG 12 lead    Collection Time: 01/03/24  7:06 PM   Result Value Ref Range    Ventricular Rate 63 BPM    Atrial Rate 63 BPM    ND Interval 160 ms    QRSD Interval 80 ms    QT Interval 420 ms    QTC Interval 429 ms    P Axis 38 degrees    QRS Axis 46 degrees    T Wave Axis 49 degrees   ECG 12 lead    Collection Time: 01/03/24  9:12 PM   Result  Value Ref Range    Ventricular Rate 54 BPM    Atrial Rate 54 BPM    AK Interval 156 ms    QRSD Interval 82 ms    QT Interval 452 ms    QTC Interval 428 ms    P Axis 37 degrees    QRS Axis 50 degrees    T Wave Axis 49 degrees   ECG 12 lead    Collection Time: 01/03/24 10:09 PM   Result Value Ref Range    Ventricular Rate 65 BPM    Atrial Rate 65 BPM    AK Interval 158 ms    QRSD Interval 82 ms    QT Interval 446 ms    QTC Interval 463 ms    P Axis 40 degrees    QRS Axis 56 degrees    T Wave Axis 50 degrees   ECG 12 lead    Collection Time: 01/03/24 11:19 PM   Result Value Ref Range    Ventricular Rate 58 BPM    Atrial Rate 58 BPM    AK Interval 154 ms    QRSD Interval 82 ms    QT Interval 460 ms    QTC Interval 451 ms    P Axis 38 degrees    QRS Axis 54 degrees    T Wave Axis 52 degrees   ECG 12 lead    Collection Time: 01/04/24 12:20 AM   Result Value Ref Range    Ventricular Rate 61 BPM    Atrial Rate 61 BPM    AK Interval 152 ms    QRSD Interval 80 ms    QT Interval 446 ms    QTC Interval 448 ms    P [place] 44 degrees    QRS Axis 55 degrees    T Wave Axis 52 degrees   ECG 12 lead    Collection Time: 01/04/24  1:31 AM   Result Value Ref Range    Ventricular Rate 63 BPM    Atrial Rate 63 BPM    AK Interval 152 ms    QRSD Interval 80 ms    QT Interval 444 ms    QTC Interval 454 ms    P Axis 44 degrees    QRS Axis 56 degrees    T Wave Axis 56 degrees   ECG 12 lead    Collection Time: 01/04/24  2:36 AM   Result Value Ref Range    Ventricular Rate 57 BPM    Atrial Rate 57 BPM    AK Interval 152 ms    QRSD Interval 82 ms    QT Interval 444 ms    QTC Interval 432 ms    P [place] 39 degrees    QRS Axis 56 degrees    T Wave Axis 55 degrees   ECG 12 lead    Collection Time: 01/04/24  4:17 AM   Result Value Ref Range    Ventricular Rate 62 BPM    Atrial Rate 62 BPM    AK Interval 152 ms    QRSD Interval 80 ms    QT Interval 432 ms    QTC Interval 438 ms    P Axis 38 degrees    QRS Axis 59 degrees    T Wave Axis 61 degrees   CBC  and differential    Collection Time: 01/04/24  5:03 AM   Result Value Ref Range    WBC 8.67 4.31 - 10.16 Thousand/uL    RBC 4.01 3.81 - 5.12 Million/uL    Hemoglobin 12.3 11.5 - 15.4 g/dL    Hematocrit 37.7 34.8 - 46.1 %    MCV 94 82 - 98 fL    MCH 30.7 26.8 - 34.3 pg    MCHC 32.6 31.4 - 37.4 g/dL    RDW 12.7 11.6 - 15.1 %    MPV 10.8 8.9 - 12.7 fL    Platelets 301 149 - 390 Thousands/uL    nRBC 0 /100 WBCs    Neutrophils Relative 62 43 - 75 %    Immat GRANS % 0 0 - 2 %    Lymphocytes Relative 29 14 - 44 %    Monocytes Relative 7 4 - 12 %    Eosinophils Relative 2 0 - 6 %    Basophils Relative 0 0 - 1 %    Neutrophils Absolute 5.33 1.85 - 7.62 Thousands/µL    Immature Grans Absolute 0.03 0.00 - 0.20 Thousand/uL    Lymphocytes Absolute 2.54 0.60 - 4.47 Thousands/µL    Monocytes Absolute 0.61 0.17 - 1.22 Thousand/µL    Eosinophils Absolute 0.14 0.00 - 0.61 Thousand/µL    Basophils Absolute 0.02 0.00 - 0.10 Thousands/µL   Comprehensive metabolic panel    Collection Time: 01/04/24  5:03 AM   Result Value Ref Range    Sodium 139 135 - 147 mmol/L    Potassium 4.0 3.5 - 5.3 mmol/L    Chloride 108 96 - 108 mmol/L    CO2 26 21 - 32 mmol/L    ANION GAP 5 mmol/L    BUN 10 5 - 25 mg/dL    Creatinine 0.88 0.60 - 1.30 mg/dL    Glucose 79 65 - 140 mg/dL    Calcium 8.5 8.4 - 10.2 mg/dL    AST 11 (L) 13 - 39 U/L    ALT 9 7 - 52 U/L    Alkaline Phosphatase 73 34 - 104 U/L    Total Protein 6.0 (L) 6.4 - 8.4 g/dL    Albumin 3.5 3.5 - 5.0 g/dL    Total Bilirubin 0.60 0.20 - 1.00 mg/dL    eGFR 85 ml/min/1.73sq m   Magnesium    Collection Time: 01/04/24  5:03 AM   Result Value Ref Range    Magnesium 2.0 1.9 - 2.7 mg/dL   Phosphorus    Collection Time: 01/04/24  5:03 AM   Result Value Ref Range    Phosphorus 3.5 2.7 - 4.5 mg/dL   Calcium, ionized    Collection Time: 01/04/24  5:03 AM   Result Value Ref Range    Calcium, Ionized 1.11 (L) 1.12 - 1.32 mmol/L   ECG 12 lead    Collection Time: 01/04/24  6:05 AM   Result Value Ref Range     Ventricular Rate 62 BPM    Atrial Rate 62 BPM    CA Interval 150 ms    QRSD Interval 78 ms    QT Interval 416 ms    QTC Interval 422 ms    P [place] 37 degrees    QRS Axis 50 degrees    T Wave [place] 53 degrees   ECG 12 lead    Collection Time: 01/04/24  6:44 AM   Result Value Ref Range    Ventricular Rate 63 BPM    Atrial Rate 63 BPM    CA Interval 148 ms    QRSD Interval 78 ms    QT Interval 424 ms    QTC Interval 433 ms    P Axis 34 degrees    QRS Axis 48 degrees    T Wave [place] 53 degrees   ECG 12 lead    Collection Time: 01/04/24  7:01 AM   Result Value Ref Range    Ventricular Rate 67 BPM    Atrial Rate 67 BPM    CA Interval 150 ms    QRSD Interval 78 ms    QT Interval 422 ms    QTC Interval 445 ms    P Axis 41 degrees    QRS Axis 61 degrees    T Wave Axis 63 degrees   ECG 12 lead    Collection Time: 01/04/24  8:07 AM   Result Value Ref Range    Ventricular Rate 66 BPM    Atrial Rate 66 BPM    CA Interval 150 ms    QRSD Interval 78 ms    QT Interval 418 ms    QTC Interval 438 ms    P Axis 41 degrees    QRS Axis 47 degrees    T Wave Axis 51 degrees   ECG 12 lead    Collection Time: 01/04/24  8:14 AM   Result Value Ref Range    Ventricular Rate 69 BPM    Atrial Rate 69 BPM    CA Interval 156 ms    QRSD Interval 78 ms    QT Interval 414 ms    QTC Interval 443 ms    P [place] 39 degrees    QRS Axis 46 degrees    T Wave Axis 47 degrees   ECG 12 lead    Collection Time: 01/04/24 10:00 AM   Result Value Ref Range    Ventricular Rate 85 BPM    Atrial Rate 85 BPM    CA Interval 158 ms    QRSD Interval 82 ms    QT Interval 374 ms    QTC Interval 445 ms    P [place] 17 degrees    QRS Axis 47 degrees    T Wave Axis 53 degrees   Comprehensive metabolic panel    Collection Time: 01/05/24  5:43 AM   Result Value Ref Range    Sodium 139 135 - 147 mmol/L    Potassium 4.2 3.5 - 5.3 mmol/L    Chloride 107 96 - 108 mmol/L    CO2 25 21 - 32 mmol/L    ANION GAP 7 mmol/L    BUN 10 5 - 25 mg/dL    Creatinine 0.75 0.60 - 1.30 mg/dL    Glucose 78  65 - 140 mg/dL    Glucose, Fasting 78 65 - 99 mg/dL    Calcium 9.1 8.4 - 10.2 mg/dL    AST 13 13 - 39 U/L    ALT 10 7 - 52 U/L    Alkaline Phosphatase 86 34 - 104 U/L    Total Protein 6.7 6.4 - 8.4 g/dL    Albumin 3.9 3.5 - 5.0 g/dL    Total Bilirubin 0.41 0.20 - 1.00 mg/dL    eGFR 103 ml/min/1.73sq m   CBC and differential    Collection Time: 01/05/24  5:43 AM   Result Value Ref Range    WBC 9.38 4.31 - 10.16 Thousand/uL    RBC 4.23 3.81 - 5.12 Million/uL    Hemoglobin 13.3 11.5 - 15.4 g/dL    Hematocrit 40.7 34.8 - 46.1 %    MCV 96 82 - 98 fL    MCH 31.4 26.8 - 34.3 pg    MCHC 32.7 31.4 - 37.4 g/dL    RDW 12.7 11.6 - 15.1 %    MPV 11.8 8.9 - 12.7 fL    Platelets 331 149 - 390 Thousands/uL    nRBC 0 /100 WBCs    Neutrophils Relative 67 43 - 75 %    Immat GRANS % 1 0 - 2 %    Lymphocytes Relative 24 14 - 44 %    Monocytes Relative 6 4 - 12 %    Eosinophils Relative 2 0 - 6 %    Basophils Relative 0 0 - 1 %    Neutrophils Absolute 6.39 1.85 - 7.62 Thousands/µL    Immature Grans Absolute 0.05 0.00 - 0.20 Thousand/uL    Lymphocytes Absolute 2.23 0.60 - 4.47 Thousands/µL    Monocytes Absolute 0.52 0.17 - 1.22 Thousand/µL    Eosinophils Absolute 0.17 0.00 - 0.61 Thousand/µL    Basophils Absolute 0.02 0.00 - 0.10 Thousands/µL   Lipid panel    Collection Time: 01/05/24  5:43 AM   Result Value Ref Range    Cholesterol 158 See Comment mg/dL    Triglycerides 89 See Comment mg/dL    HDL, Direct 41 (L) >=50 mg/dL    LDL Calculated 99 0 - 100 mg/dL    Non-HDL-Chol (CHOL-HDL) 117 mg/dl   hCG, serum, qualitative    Collection Time: 01/05/24  5:43 AM   Result Value Ref Range    Preg, Serum Negative Negative   TSH, 3rd generation with Free T4 reflex    Collection Time: 01/05/24  5:43 AM   Result Value Ref Range    TSH 3RD GENERATON 2.482 0.450 - 4.500 uIU/mL        Progress Toward Goals & Illness Status: Patient is not at goal. They are psychiatrically unstable and are not yet ready for discharge. The patient's condition currently  requires active psychopharmacological medication management, interdisciplinary coordination with case management, and the utilization of adjunctive milieu and group therapy to augment psychopharmacological efficacy. The patient's risk of morbidity, and progression or decompensation of psychiatric disease, is high without this current treatment.           -----------------------------------    Risks / Benefits of Treatment:     Risks, benefits, and possible side effects of medications explained to patient. The patient verbalizes understanding and agreement for treatment.     Counseling / Coordination of Care:     Patient's presentation on admission and proposed treatment plan were discussed with the treatment team.  Diagnosis, medication changes and treatment plan were reviewed with the patient.  Recent stressors were discussed with the patient.  Events leading to admission were reviewed with the patient.  Importance of medication and treatment compliance was reviewed with the patient.          Inpatient Psychiatric Certification:     Certification: Based upon physical, mental and social evaluations, I certify that inpatient psychiatric services are medically necessary for this patient for a duration of 5-7 midnights (exact duration TBD pending patient's response to psychiatric treatment) for the diagnosis listed above.     Available alternative community resources do not meet the patient's mental health care needs.  I further attest that an established written individualized plan of care has been implemented and is outlined in the patient's medical records.        This note has been constructed using a voice recognition system. There may be translation, syntax, or grammatical errors. If you have any questions, please contact the dictating provider.

## 2024-01-05 NOTE — CONSULTS
[facility]  Consult  Name: Barbie Garcia 33 y.o. female I MRN: [id number]  Unit/Bed#: U 214-02 I Date of Admission: 1/4/2024   Date of Service: 1/5/2024 I Hospital Day: 1    Inpatient consult for Medical Clearance for  patient  Consult performed by: Jess Mclaughlin PA-C  Consult ordered by: Oscar Sahni PA-C          Assessment/Plan   Medical clearance for psychiatric admission  Assessment & Plan  Vital signs stable at time of assessment  CBC, CMP, TSH WNL  EKG: NSR normal QTc HR 85  Patient appears medically stable at this time for inpatient psychiatric treatment    Obesity (BMI 30-39.9)  Assessment & Plan  BMI 35.85  Lifestyle modifications    Hypothyroidism  Assessment & Plan  TSH WNL  Continue synthroid    * Severe episode of recurrent major depressive disorder, without psychotic features (HCC)  Assessment & Plan  Initially presented to [facility] due to intentional drug overdose requiring medical admission  Cleared medically for transfer to [facility]  Further plan per psychiatry         VTE Prophylaxis:   Low Risk (Score 0-2) - Encourage Ambulation.    Mobility:      HLM Goal achieved. Continue to encourage appropriate mobility.    Recommendations for Discharge:  Follow up with PCP after discharge    Total Time Spent on Date of Encounter in care of patient: 30 mins. This time was spent on one or more of the following: performing physical exam; counseling and coordination of care; obtaining or reviewing history; documenting in the medical record; reviewing/ordering tests, medications or procedures; communicating with other healthcare professionals and discussing with patient's family/caregivers.    Collaboration of Care: Were Recommendations Directly Discussed with Primary Treatment Team? No    History of Present Illness:  Barbie Garcia is a 33 y.o. female who is originally admitted to the psychiatry service due to intentional OD. We are consulted for  medical clearance.    Past medical history significant for hypothyroidism, depression/anxiety.  Patient originally presented to the [facility] ED following intentional overdose with lisinopril, propranolol, diphenhydramine and possibly some Synthroid tabs.  Toxicology was consulted and recommended monitoring as well as serial EKGs.  Ultimately patient was medically cleared for transfer to inpatient behavioral health unit.  Currently denies any physical complaints including chest pain/palpitations, shortness of breath, nausea/vomiting, abdominal pain, constipation/diarrhea, fever/chills.    Review of Systems:  Review of Systems   Constitutional:  Negative for chills, fatigue, fever and unexpected weight change.   HENT:  Negative for congestion, sore throat and trouble swallowing.    Eyes:  Negative for photophobia, pain and visual disturbance.   Respiratory:  Negative for cough, shortness of breath and wheezing.    Cardiovascular:  Negative for chest pain, palpitations and leg swelling.   Gastrointestinal:  Negative for abdominal pain, constipation, diarrhea, nausea and vomiting.   Endocrine: Negative for polyuria.   Genitourinary:  Negative for difficulty urinating, dysuria, flank pain, hematuria and urgency.   Musculoskeletal:  Negative for back pain, myalgias, neck pain and neck stiffness.   Skin:  Negative for pallor and rash.   Neurological:  Negative for dizziness, tremors, syncope, weakness, light-headedness, numbness and headaches.   Hematological:  Does not bruise/bleed easily.   Psychiatric/Behavioral:  Positive for dysphoric mood. Negative for agitation and confusion. The patient is nervous/anxious.        Past Medical and Surgical History:   Past Medical History:   Diagnosis Date    Allergic     Anxiety     Depression     Disease of thyroid gland     Hypertension     Hypothyroid     Obesity        Past Surgical History:   Procedure Laterality Date     SECTION      x 3     "DILATION AND CURETTAGE OF UTERUS         Meds/Allergies:  all medications and allergies reviewed    Allergies: No Known Allergies    Social History:  Marital Status: /Civil Union  Substance Use History:   Social History     Substance and Sexual Activity   Alcohol Use No     Social History     Tobacco Use   Smoking Status Former    Current packs/day: 0.00    Average packs/day: 0.3 packs/day for 1 year (0.3 ttl pk-yrs)    Types: Cigarettes    Start date:     Quit date:     Years since quittin.0   Smokeless Tobacco Never   Tobacco Comments    Only smoked 1-2 cigs per day at most     Social History     Substance and Sexual Activity   Drug Use No       Family History:  Family History   Problem Relation Age of Onset    Skin cancer Mother     Cancer Mother     Diabetes Family     No Known Problems Father        Physical Exam:   Vitals:   Blood Pressure: 133/88 (24)  Pulse: 79 (24)  Temperature: 98.2 °F (36.8 °C) (24)  Temp Source: Tympanic (24)  Respirations: 18 (24)  Height: 5' 2\" (157.5 cm) (24)  Weight - Scale: 88.9 kg (196 lb) (24)  SpO2: 99 % (24)    Physical Exam  Vitals and nursing note reviewed.   Constitutional:       Appearance: Normal appearance.      Comments: No acute distress   HENT:      Head: Normocephalic.   Eyes:      General: No scleral icterus.     Extraocular Movements: Extraocular movements intact.      Conjunctiva/sclera: Conjunctivae normal.   Cardiovascular:      Rate and Rhythm: Normal rate and regular rhythm.      Heart sounds: S1 normal and S2 normal.   Pulmonary:      Effort: Pulmonary effort is normal.      Breath sounds: Normal breath sounds. No wheezing, rhonchi or rales.   Abdominal:      General: Bowel sounds are normal.      Palpations: Abdomen is soft.      Tenderness: There is no abdominal tenderness. There is no guarding or rebound.   Musculoskeletal:         General: No " "swelling, tenderness or deformity.      Cervical back: Normal range of motion.      Comments: Ambulating unit without difficulty, no edema   Skin:     General: Skin is warm and dry.   Neurological:      Mental Status: She is alert and oriented to person, place, and time.   Psychiatric:         Mood and Affect: Mood normal.         Speech: Speech normal.         Behavior: Behavior normal.          Additional Data:   Lab Results:    Results from last 7 days   Lab Units 01/05/24  0543   WBC Thousand/uL 9.38   HEMOGLOBIN g/dL 13.3   HEMATOCRIT % 40.7   PLATELETS Thousands/uL 331   NEUTROS PCT % 67   LYMPHS PCT % 24   MONOS PCT % 6   EOS PCT % 2     Results from last 7 days   Lab Units 01/05/24  0543   SODIUM mmol/L 139   POTASSIUM mmol/L 4.2   CHLORIDE mmol/L 107   CO2 mmol/L 25   BUN mg/dL 10   CREATININE mg/dL 0.75   ANION GAP mmol/L 7   CALCIUM mg/dL 9.1   ALBUMIN g/dL 3.9   TOTAL BILIRUBIN mg/dL 0.41   ALK PHOS U/L 86   ALT U/L 10   AST U/L 13   GLUCOSE RANDOM mg/dL 78     Results from last 7 days   Lab Units 01/03/24  1019   INR  0.89         No results found for: \"HGBA1C\"  Results from last 7 days   Lab Units 01/03/24  1024   POC GLUCOSE mg/dl 89           Imaging: Reviewed radiology reports from this admission including: chest xray  No orders to display       EKG, Pathology, and Other Studies Reviewed on Admission:   EKG: NSR. HR 85.    ** Please Note: This note may have been constructed using a voice recognition system. **        "

## 2024-01-05 NOTE — ASSESSMENT & PLAN NOTE
Vital signs stable at time of assessment  CBC, CMP, TSH WNL  EKG: NSR normal QTc HR 85  Patient appears medically stable at this time for inpatient psychiatric treatment

## 2024-01-06 PROCEDURE — 99232 SBSQ HOSP IP/OBS MODERATE 35: CPT | Performed by: PSYCHIATRY & NEUROLOGY

## 2024-01-06 RX ADMIN — LEVOTHYROXINE SODIUM 50 MCG: 25 TABLET ORAL at 05:14

## 2024-01-06 RX ADMIN — ESCITALOPRAM OXALATE 10 MG: 10 TABLET ORAL at 09:21

## 2024-01-06 NOTE — TREATMENT TEAM
01/06/24 1100   Team Meeting   Meeting Type Daily Rounds   Team Members Present   Team Members Present Physician;Nurse   Physician Team Member Kanwal Sage   Nursing Team Member Shine   Patient/Family Present   Patient Present No   Patient's Family Present No     Daily Rounds: Pt denies SI, expresses remorse for OD.  Struggling with being away from her children.  Pleasant and cooperative.

## 2024-01-06 NOTE — PLAN OF CARE
Problem: SELF HARM/SUICIDALITY  Goal: Will have no self-injury during hospital stay  Description: INTERVENTIONS:  - Q 15 MINUTES: Routine safety checks  - Q WAKING SHIFT & PRN: Assess risk to determine if routine checks are adequate to maintain patient safety  - Encourage patient to participate actively in care by formulating a plan to combat response to suicidal ideation, identify supports and resources  Outcome: Progressing     Problem: DEPRESSION  Goal: Will be euthymic at discharge  Description: INTERVENTIONS:  - Administer medication as ordered  - Provide emotional support via 1:1 interaction with staff  - Encourage involvement in milieu/groups/activities  - Monitor for social isolation  Outcome: Progressing     Problem: ANXIETY  Goal: Will report anxiety at manageable levels  Description: INTERVENTIONS:  - Administer medication as ordered  - Teach and encourage coping skills  - Provide emotional support  - Assess patient/family for anxiety and ability to cope  Outcome: Progressing  Goal: By discharge: Patient will verbalize 2 strategies to deal with anxiety  Description: Interventions:  - Identify any obvious source/trigger to anxiety  - Staff will assist patient in applying identified coping technique/skills  - Encourage attendance of scheduled groups and activities  Outcome: Progressing     Problem: PAIN - ADULT  Goal: Verbalizes/displays adequate comfort level or baseline comfort level  Description: Interventions:  - Encourage patient to monitor pain and request assistance  - Assess pain using appropriate pain scale  - Administer analgesics based on type and severity of pain and evaluate response  - Implement non-pharmacological measures as appropriate and evaluate response  - Consider cultural and social influences on pain and pain management  - Notify physician/advanced practitioner if interventions unsuccessful or patient reports new pain  Outcome: Progressing

## 2024-01-06 NOTE — PROGRESS NOTES
"    Psychiatric Progress Note - Department of Behavioral Health   Barbie Garcia 33 y.o. female MRN: [id number]  Unit/Bed#: [facility] 214-02 Encounter: [id number]      ASSESSMENT & PLAN     Principal Problem:    Severe episode of recurrent major depressive disorder, without psychotic features (HCC)  Active Problems:    Hypothyroidism    Mild tetrahydrocannabinol (THC) abuse    Medical clearance for psychiatric admission    Obesity (BMI 30-39.9)      Recommended Treatment:   No psychopharmacologic changes necessary at this moment; will continue to assess daily for further optimization.    Continue medical management by medical team.  Continue to assess for adverse medication side effects.  Continue with pharmacotherapy, and promote patient participation in groups, therapeutic milieu, and occupational therapy.  Encourage Barbie Garcia to participate in nonverbal forms of therapy including journaling and art/music therapy.  Continue frequent safety checks every 7 minutes and vitals per unit protocol.    Discharge disposition:  Case discussed with treatment team.  201 commitment status.    SUBJECTIVE     All documentation including nursing notes, medication history to ensure medication adherence on the unit, labs, and vitals were reviewed during discussion with treatment team.  Over the past 24 hours, staff noted the patient has been cooperative on the unit and compliant with medications with no acute events overnight.  Nursing reported patient is remorseful of the events prior to admission, and wants to be with her children.    Barbie was evaluated this morning for continuity of care and no acute distress noted throughout the evaluation. Today on exam, the patient was seen outside of art therapy group, wearing blanket over her shoulders, pleasant, and reports feeling \"good.\"  Patient denied any side effects to medications, no pain or somatic symptoms, no symptoms of psychosis, no panic attacks, and stated she will be " "doing counseling with her .  Today, Barbie denies suicidal and homicidal ideations and is david for safety on the unit.    PSYCHIATRIC REVIEW OF SYSTEMS     Behavior over the last 24 hours: improved  Sleep: normal  Appetite: normal  Medication side effects: No    REVIEW OF SYSTEMS     Review of systems: no complaints    OBJECTIVE     Vital Signs in Past 24 Hours:  Temp:  [97.8 °F (36.6 °C)-98.3 °F (36.8 °C)] 97.8 °F (36.6 °C)  HR:  [] 90  Resp:  [18] 18  BP: (127-145)/(81-86) 127/86    Intake/Output in Past 24 hours:  No intake/output data recorded.  No intake/output data recorded.        Laboratory Results:  I have personally reviewed all pertinent laboratory/tests results.    Mental Status Evaluation:    Appearance:  age appropriate, dressed appropriately, adequate grooming, looks stated age, overweight   Behavior:  pleasant, cooperative, calm   Speech:  normal rate, normal volume, normal pitch   Mood:  \"Good\"   Affect:  slightly brighter, more appropriate, less constricted   Language naming objects and repeating phrases   Thought Process:  organized, logical, coherent, goal directed, linear, normal rate of thoughts   Associations: intact associations   Thought Content:  no overt delusions   Perceptual Disturbances: Denies auditory or visual hallucinations and Does not appear to be responding to internal stimuli   Risk Potential: Suicidal ideation - None at present, contracts for safety on the unit, would talk to staff if not feeling safe on the unit  Homicidal ideation - None  Potential for aggression - No   Sensorium:  oriented to person, place, time/date, and situation   Memory/Cognition:  recent and remote memory grossly intact   Consciousness:  alert and awake   Attention/Concentration: attention span and concentration are age appropriate   Intellect Average   Insight:  improving   Judgment: improving   Gait/Station: normal gait/station, normal balance   Motor Activity: no abnormal " movements     Recommended Treatment:   See above for assessment and plan.    Behavioral Health Medications: all current active meds have been reviewed.    Current Facility-Administered Medications   Medication Dose Route Frequency Provider Last Rate    acetaminophen  650 mg Oral Q6H PRN [facility] Sahni, PA-MAZIN      acetaminophen  650 [facility]4H [facility]      acetaminophen  975 mg Oral Q6H PRN [facility] Sahni, PA-C      aluminum-magnesium hydroxide-simethicone  30 mL Oral Q4H [facility]      artificial tear   Both Eyes 4x Daily [facility], [facility]-C      benztropine  1 mg Intramuscular BID [facility]N [facility]      benztropine  1 mg Oral BID PRN [facility], [facility]-C      bisacodyl  10 mg Rectal Daily [facility]      hydrOXYzine HCL  50 mg Oral Q6H PRN Max 4/day [facility] Bora, PA-MAZIN      Or    diphenhydrAMINE  50 mg Intramuscular Q6H PR[facility]      escitalopram  10 mg Oral Daily Asim Noriega, DO      hydrOXYzine HCL  100 mg Oral Q6H PRN Max 4/day [facility] Bora, PA-MAZIN      Or    LORazepam  2 mg Intramuscular Q6H PRN [facility], [facility]-C      hydrOXYzine HCL  25 mg Oral Q6H PRN Max 4/day [facility]      levothyroxine  50 mcg Oral Early Morning Jess Mclaughlin, PA-C      magnesium hydroxide  30 mL Oral Daily PRN [facility] Sahni, PA-C      OLANZapine  10 mg Oral Q3H PRN Max 3/day [facility] Sahni, PA-C      Or    OLANZapine  10 mg Intramuscular Q3H PRN Max 3/day [facility]      OLANZapine  5 mg Oral Q3H PRN Max 6/day [facility] Sahni, PA-C      Or    OLANZapine  5 mg Intramuscular Q3H PRN Max 6/day [facility] Sahni, PA-C      OLANZapine  2.5 mg Oral Q3H PRN Max 8/day [facility] Sahni, PA-C      senna-docusate sodium  1 tablet Oral Daily PRN [facility] Sahni, PA-C      traZODone  50 mg Oral HS PRN [facility] Bora, PA-MAZIN         Risks, benefits and possible side effects of Medications:   Risks, benefits, and possible side  effects of medications explained to patient and patient verbalizes understanding.      Dual Antipsychotic Rationale: Not applicable    Inpatient Psychiatric Certification: Based upon physical, mental and social evaluations, I certify that inpatient psychiatric services are medically necessary for this patient for a duration of >2 midnights for the treatment of Severe episode of recurrent major depressive disorder, without psychotic features (HCC)  Available alternative community resources do not meet the patient's mental health care needs.  I further attest that an established written individualized plan of care has been implemented and is outlined in the patient's medical records.    Counseling/Coordination of Care    Total unit time spent today was greater than 15 minutes. Greater than 50% of total time was spent with the patient and/or patient's relatives and/or coordination of patient's care.   Patient's rights, confidentiality, exceptions to confidentiality, use of electronic medical record including appropriate staff access to medical record regarding behavioral health services and consent to treatment were reviewed.    Aj Sanchez,     This note was completed in part utilizing [employer]. Grammatical, translation, syntax errors, random word insertions, spelling mistakes, and incomplete sentences may be an occasional consequence of this system secondary to software limitations with voice recognition, ambient noise, and hardware issues. If you have any questions or concerns about the content, text, or information contained within the body of this dictation, please contact the provider for clarification.     Note Share:   This note was not shared with the patient due to reasonable likelihood of causing patient harm.

## 2024-01-07 PROCEDURE — 99232 SBSQ HOSP IP/OBS MODERATE 35: CPT | Performed by: PSYCHIATRY & NEUROLOGY

## 2024-01-07 RX ADMIN — LEVOTHYROXINE SODIUM 50 MCG: 25 TABLET ORAL at 06:03

## 2024-01-07 RX ADMIN — ESCITALOPRAM OXALATE 10 MG: 10 TABLET ORAL at 10:12

## 2024-01-07 NOTE — PROGRESS NOTES
"    Psychiatric Progress Note - Department of Behavioral Health   Barbie Garcia 33 y.o. female MRN: [id number]  Unit/Bed#: [facility] 214-02 Encounter: [id number]      ASSESSMENT & PLAN     Principal Problem:    Severe episode of recurrent major depressive disorder, without psychotic features (HCC)  Active Problems:    Hypothyroidism    Mild tetrahydrocannabinol (THC) abuse    Medical clearance for psychiatric admission    Obesity (BMI 30-39.9)      Recommended Treatment:   No psychopharmacologic changes necessary at this moment; will continue to assess daily for further optimization.    Continue medical management by medical team.  Continue to assess for adverse medication side effects.  Continue with pharmacotherapy, and promote patient participation in groups, therapeutic milieu, and occupational therapy.  Encourage Barbie Garcia to participate in nonverbal forms of therapy including journaling and art/music therapy.  Continue frequent safety checks every 7 minutes and vitals per unit protocol.    Discharge disposition:  Case discussed with treatment team. 201 commitment status.    SUBJECTIVE     All documentation including nursing notes, medication history to ensure medication adherence on the unit, labs, and vitals were reviewed during discussion with treatment team.  Over the past 24 hours, staff noted the patient has been cooperative on the unit and compliant with medications with no acute events overnight.  Nursing reported patient continues to be pleasant, calm, going to groups, missing her children, withdrawn at times, slept well.    Barbie was evaluated this morning for continuity of care and no acute distress noted throughout the evaluation. Today on exam, the patient was seen outside the lunchroom and reports feeling \"good.\"  Patient denied any symptoms, reported depression or anxiety in control, no passive death wishes, provided psychoeducation regarding her medications.  We reviewed safety planning " "and patient continues to deny suicidal and homicidal ideations and is david for safety on the unit.    PSYCHIATRIC REVIEW OF SYSTEMS     Behavior over the last 24 hours: improved  Sleep: normal  Appetite: normal  Medication side effects: No    REVIEW OF SYSTEMS     Review of systems: no complaints    OBJECTIVE     Vital Signs in Past 24 Hours:  Temp:  [97.8 °F (36.6 °C)-98.9 °F (37.2 °C)] 98.9 °F (37.2 °C)  HR:  [90-98] 98  Resp:  [18] 18  BP: (127-141)/(86-91) 141/91    Intake/Output in Past 24 hours:  No intake/output data recorded.  No intake/output data recorded.        Laboratory Results:  I have personally reviewed all pertinent laboratory/tests results.    Mental Status Evaluation:    Appearance:  age appropriate, casually dressed, adequate grooming, looks stated age, overweight   Behavior:  pleasant, cooperative, calm, good eye contact   Speech:  normal rate and volume   Mood:  \"Good\"   Affect:  brighter   Language naming objects and repeating phrases   Thought Process:  organized, logical, goal directed, linear, normal rate of thoughts   Associations: intact associations   Thought Content:  no overt delusions   Perceptual Disturbances: Denies auditory or visual hallucinations and Does not appear to be responding to internal stimuli   Risk Potential: Suicidal ideation - None, remorseful about suicide attempt, contracts for safety on the unit, would talk to staff if not feeling safe on the unit  Homicidal ideation - None  Potential for aggression - No   Sensorium:  oriented to person, place, time/date, and situation   Memory/Cognition:  recent and remote memory grossly intact   Consciousness:  alert and awake   Attention/Concentration: attention span and concentration are age appropriate   Intellect Average   Insight:  fair and improving   Judgment: fair   Gait/Station: normal gait/station, normal balance   Motor Activity: no abnormal movements     Recommended Treatment:   See above for assessment and " plan.    Behavioral Health Medications: all current active meds have been reviewed.    Current Facility-Administered Medications   Medication Dose Route Frequency Provider Last Rate    acetaminophen  650 mg Oral Q6H PRN [facility], PA-MAZIN      acetaminophen  650 mg Oral Q4H PRN [facility], PA-C      acetaminophen  975 mg Oral Q6H PRN [facility]      aluminum-magnesium hydroxide-simethicone  30 mL Oral Q4H PRN [facility], PA-C      artificial tear   Both Eyes 4x Daily PRN [facility], PA-C      benztropine  1 mg Intramuscular BID PRN [facility], PA-C      benztropine  1 mg Oral BID PRN [facility], PA-C      bisacodyl  10 mg Rectal Daily PRN [facility], PA-C      hydrOXYzine HCL  50 mg Oral Q6H PRN Max 4/day [facility], [facility]-MAZ[facility]      Or    diphenhydrAMINE  50 mg Intramuscular Q6H PRN [facility], PA-C      escitalopram  10 mg Oral Daily Asim Noriega, DO      hydrOXYzine HCL  100 mg Oral Q6H PRN Max 4/day [facility], PA-C      Or    LORazepam  2 mg Intramuscular Q6H PRN [facility], PA-C      hydrOXYzine HCL  25 mg Oral Q6H PRN Max 4/day [facility], PA-C      levothyroxine  50 mcg Oral Early Morning Jess Mclaughlin, PA-C      magnesium hydroxide  30 mL Oral Daily PRN [facility], PA-C      OLANZapine  10 mg Oral Q3H PRN Max 3/day [facility], PA-C      Or    OLANZapine  10 mg Intramuscular Q3H PRN Max 3/day [facility], PA-C      OLANZapine  5 mg Oral Q3H PRN Max 6/day [facility], PA-C      Or    OLANZapine  5 mg Intramuscular Q3H PRN Max 6/day [facility], PA-C      OLANZapine  2.5 mg Oral Q3H PRN Max 8/day [facility], PA-C      senna-docusate sodium  1 tablet Oral Daily PRN [facility], PA-C      traZODone  50 mg Oral HS PRN [facility]         Risks, benefits and possible side effects of Medications:   Risks, benefits, and possible side effects of medications explained to patient and patient verbalizes  understanding.      Dual Antipsychotic Rationale: Not applicable    Inpatient Psychiatric Certification: Based upon physical, mental and social evaluations, I certify that inpatient psychiatric services are medically necessary for this patient for a duration of >2 midnights for the treatment of Severe episode of recurrent major depressive disorder, without psychotic features (HCC)  Available alternative community resources do not meet the patient's mental health care needs.  I further attest that an established written individualized plan of care has been implemented and is outlined in the patient's medical records.    Counseling/Coordination of Care    Total unit time spent today was greater than 15 minutes. Greater than 50% of total time was spent with the patient and/or patient's relatives and/or coordination of patient's care.   Patient's rights, confidentiality, exceptions to confidentiality, use of electronic medical record including appropriate staff access to medical record regarding behavioral health services and consent to treatment were reviewed.    Aj Sanchez, DO    This note was completed in part utilizing [employer]. Grammatical, translation, syntax errors, random word insertions, spelling mistakes, and incomplete sentences may be an occasional consequence of this system secondary to software limitations with voice recognition, ambient noise, and hardware issues. If you have any questions or concerns about the content, text, or information contained within the body of this dictation, please contact the provider for clarification.     Note Share:   This note was not shared with the patient due to reasonable likelihood of causing patient harm.

## 2024-01-07 NOTE — TREATMENT TEAM
01/07/24 0540   Mental Status Exam   Sleep Pattern Disturbed/interrupted sleep;Early awakening     Pt appeared to have been sleeping until roommate's verbalizing awoke her. Pt then woke began reading a book.

## 2024-01-07 NOTE — PLAN OF CARE
Problem: SELF HARM/SUICIDALITY  Goal: Will have no self-injury during hospital stay  Description: INTERVENTIONS:  - Q 15 MINUTES: Routine safety checks  - Q WAKING SHIFT & PRN: Assess risk to determine if routine checks are adequate to maintain patient safety  - Encourage patient to participate actively in care by formulating a plan to combat response to suicidal ideation, identify supports and resources  Outcome: Progressing

## 2024-01-07 NOTE — TREATMENT TEAM
01/07/24 0800   Team Meeting   Meeting Type Daily Rounds   Team Members Present   Team Members Present Physician;Nurse   Physician Team Member Kanwal Sage   Nursing Team Member Shine   Patient/Family Present   Patient Present No   Patient's Family Present No     Daily Rounds: Pt is pleasant, denies SI.  Misses her children.  Woke a few times overnight d/t roommate, otherwise slept.

## 2024-01-08 VITALS
HEART RATE: 96 BPM | HEIGHT: 62 IN | OXYGEN SATURATION: 96 % | SYSTOLIC BLOOD PRESSURE: 120 MMHG | WEIGHT: 192 LBS | BODY MASS INDEX: 35.33 KG/M2 | TEMPERATURE: 98.4 F | DIASTOLIC BLOOD PRESSURE: 88 MMHG | RESPIRATION RATE: 17 BRPM

## 2024-01-08 PROCEDURE — 99232 SBSQ HOSP IP/OBS MODERATE 35: CPT | Performed by: PSYCHIATRY & NEUROLOGY

## 2024-01-08 RX ADMIN — ACETAMINOPHEN 650 MG: 325 TABLET ORAL at 18:55

## 2024-01-08 RX ADMIN — LEVOTHYROXINE SODIUM 50 MCG: 25 TABLET ORAL at 06:13

## 2024-01-08 RX ADMIN — ESCITALOPRAM OXALATE 10 MG: 10 TABLET ORAL at 08:26

## 2024-01-08 NOTE — DISCHARGE INSTR - OTHER ORDERS
[facility] Crisis Services  [facility] Crisis Line  If you, a friend, or family member are in crisis, you can also call a site-based crisis center at [phone] 24 hours a day, 7 days a week. Your call will be routed to the nearest center, or you can also call a center directly.    Mental Health Crisis Numbers:  [facility] Crisis:  [phone]  [place] Crisis:  [phone]  [place] Crisis:  [phone]     Warmlines   [facility] Peer Recovery Warm Line  P: [phone]  What does the WarmLine provide?   Understanding, respectful, supportive conversation   to each caller and assistance with problem solving   when needed    Information in response to requests from   consumers or their family members   Follow-up contact will be made to callers when   appropriate to assure that callers have received   the desired information and services  When can I call?   The hours of operation for the Peer Recovery   WarmLine are 1:00 pm - 5:00 pm, Monday   through Friday, except major holidays.     [employer] CONTACT  P: [phone]    [facility] Emotional Support WarmLine  P: [phone] press 2; 3:00pm - 10:00pm, 7 days a week    [facility] Service [facility]   Contact Helpline- [phone]  [address] [place] [address], [address]  Provides free, anonymous, and confidential telephone helpline services to help with severe mental health issues to financial concerns, family issues or simply a desire to be heard.  [facility] Mobile Crisis Intervention Services    To request Adult & Children's Mobile Crisis Intervention Services, please call [phone]. A crisis worker will provide supportive crisis counseling and arrange a mobile team intervention. Adult & Children's Mobile Crisis Intervention Services are available:  Mon-Fri (7:00 am - 11:00 pm)  Sat & Sun (9:00 am - 9:30 pm)  Overnight hours available based on urgency of referral.    [facility]  Located at [facility]   [address] [place] [address]   P: [phone]    [facility] Located on the Grounds of [address] [address]   P: [phone]    ALICIA Helpline    P: [phone], 9:00am - 9:00pm; text ALICIA to 052410 (); [email or web address]    [facility] Crisis [facility] ([facility])    [address] [place] [address]  P: [phone]  Trained crisis workers are available 7:00 am - 11:00 pm daily to offer supportive problem-solving strategies for mental health and substance use emergencies. If it becomes apparent that the situation requires immediate face-to-face intervention or medical attention, we will direct the caller to his/her nearest hospital emergency room.    Substance Abuse and Mental Health Services Administration (SAMHSA) National Helpline:   1-562.164.4779   Website: www.[facility].gov  A confidential, free, 24-hour-a-day, 365-day-a-year, information service for individuals and family members facing mental health and/or substance use disorders. This service provides referrals to local treatment facilities, support groups, and community-based organizations. Callers can also order free publications and other information.      [facility] Alcohol Treatment  [address] [place] [address], 4th [place] [address]  [email or web address]  8:30am-4:30pm Monday-Friday  Administrative  Tel: [phone]  Fax: [phone]    If you need immediate help, Call Approval of Care at the number below  Approval of Care   Tel: [phone]  Fax: [phone]    [facility]. - [facility] ()   P: [phone]  [address] [place] [address] (on the grounds of [facility])  In , more than 100,000 Americans  from a drug overdose. While over 20 million people desperately need addiction treatment, only a small percentage ever receive care.  Most start with searching on the internet for an  “alcohol rehab near me”, opioid addiction treatment services, or more recently, help with fentanyl addiction. For many, this is the first step on the road to recovery.    Whether you're looking for addiction treatment for yourself or a family member, Momofrankirobinson can help. Our [facility] drug and alcohol treatment program provides:  24-hour Assessment Services  Detox and/or Withdrawal Management  High-Intensity Residential MARINA Treatment  Medication-Assisted Treatment (MAT)  Individual and Group Counseling      [facility]   P: [phone]  [address], [address], [place] [address]  Monday - Saturday, 8:00am - 8:00pm  If you're ready to take the first steps on the road to recovery or if you have any questions about our addiction treatment services, admissions process, funding or what to expect, please call our substance abuse treatment center admissions department    [facility]  P: [phone]  [address] [place] [address]  Monday 6:30am - 3:00pm   Tuesday 6:30am - 3:00pm   Wednesday 6:30am - 3:00pm   Thursday 6:30am - 3:00pm   Friday 6:30am - 3:00pm   Saturday 7:00am - 11:30am   Sunday 7:00am - 11:30am   [facility] in [place] is an outpatient substance use disorder treatment facility that provides medication-assisted treatment (MAT) to persons dealing with a dependence on opioids. Its MAT program utilizes methadone to help reduce patients' drug cravings and curb withdrawal symptoms. The methadone clinic in [place] utilizes what is considered the gold standard of opioid addiction treatment - methadone maintenance or detoxification services in conjunction with a comprehensive therapy program. [employer] [facility] [place] has a holistic approach to care which balances physical treatment with psychological/psychosocial therapy services. The clinic's treatment plans are developed on a personal basis to better support each patient's unique  recovery needs and goals. SANTOS Acevedo believes that this comprehensive and personalized approach to care in an outpatient setting is a very effective method to help each patient overcome substance addiction. [facility] has used this approach to successfully treat many patients who have gone on to lead meaningful lives.    Advocates for the Homeless & Those in Need  Homeless Drop-In Center  Those in need of shelter can now go to the Homeless Drop-In Center Monday through Friday from 7:00am to 7:00pm.  The center is located at [facility] [address].   Services include: Shower, Washer & Dryer, charging stations along with light refreshments.  [facility] [place]  Advocates for Homeless & Those in Need (AHTN) started the Code Blue [place] in the Winter of 2009. This mission provides transportation, a hot meal and a cot for the night when weather conditions become dangerous to unsheltered individuals. This year we will start our 14th Code Blue Shelter Season. Code Blue runs from December 1st to March 31st.  AHTN refers to those we serve as guests and friends as they are treated with and dignity.   For our 2022-23 season, the Code Blue shelter will be returning to 4 locations  December Host Site:  [facility]  [address] [place] [address]    February Host Site:   [facility]  [address] [place] [address]    January Host Site:   [facility]  [address] [place] [address] [place] [address]    March Host Site:  [address] [address]      Coalition to Shelter and Support the Homeless   Homeless Outreach Services  May - November on the first Thursday of each month beginning May from 6pm - 7:15pm at [facility], [facility], ([facility]).  Meal dates: Thursdays May 4, June 1, July 6, August 3, September 7, October 5, November  2.  Van transportation provided: pickup at 5:30 pm from Kristina Wallacemart, (stand by picnic table- not store entrance!) [address]  Return ride provided at 7:15 pm  Our outreach team can provide assistance to homeless and financially distressed adults who participate in our outreach program or access our [facility] in [facility]. Contact our outreach team at [phone] ext. [phone] and leave a message for more information. Post Season [facility] Outreach Meals: After the [facility] closes at the end of March, [facility]'s outreach team continues to support those in need in [facility].  Beginning in May and ending in November each year, you may join us on the first Thursday of each month for a warm meal, stock up on supplies and limited gift cards, when available, and request help with referrals and important paperwork to assist in benefits, employment and housing.  [facility], Transportation is Available  Coalition to Shelter and Support the Homeless  Tel: [phone] , Outreach Ext. 703  [email or web address]    [facility]  Transportation is available  Tel: [phone]  [email or web address]    [facility]  Advocates for the Homeless and Those in Need  Telephone: [phone]  [email or web address]    For limited financial assistance [food gift cards, gas, or emergency hotel rooms (1 or 2 nights only)]  [facility] FISH  [phone]  [place] FISH   [phone]  [facility]   [phone]  [facility]   [phone]    Homeless Resource Guide  [facility] Human Services Connect - The Hub  [facility] ([facility]) , [address], 1st Floor, [place]   [phone]  [email or web address]   Staffed to provide assistance in accessing [facility] services, walk in hours or by  appointment  10 - 4 Monday through Friday    Homeless Minors or Young Adults - [facility] Shelter  [facility]ly [facility] and Homeless shelter in [place], [place]  serving boys and girls ages 12 - 17.  Shelter and Emergency Hotline: 920.397.4195  [email or web address]@[employer]    [facility] Hotline- for domestic violence shelter and support  (Shelter, advocacy, children's programs, [facility] Children and Youth Services)  Telephone: 557.305.9666      Network of Victims Assistance (NOVA)  Hotline: 147.755.1891      DEPARTMENT OF PUBLIC WELFARE  Public Assistance and Welfare Benefits  Telephone: 1-221.266.6215    [facility] AGENCY ON AGING  Assistance for seniors age 62 and older to access services and benefits  Telephone: [phone]  Office hours: Monday-Friday - 8:30 AM - 4:30 PM  24 hr. Protection from Abuse Hotline: 1-458.494.3766  [address] [place] [address], [address]  [place] [address]      Home Heating Crisis Assistance  [facility] Assistance Office  Assistance with home heating crisis situations will be available 24 hours a day.  [address] [place] [address]   Phone: [phone], Toll Free: 1-476.489.9527, FAX: [phone]  [facility] 675-507-9045 or 1-872.929.9970    Free Clinics/Healthcare  [facility] ([facility]-will take undocumented individuals)  Telephone: [phone]    [facility]  Telephone: [phone]    [facility]  Free Medical Care for the Uninsured  Telephone: [phone]    Prescription Assistance for uninsured and underinsured - no name or address needed  www.[employer]  Telephone: [phone]    Food Pantries  [place] [facility] - for food, emergency 1 night housing, gas  Telephone: [phone]    [facility] -[facility] [place]  [address] [place], [place]  Telephone: [phone]    [facility]  [address] [place] [address]  Telephone: [phone]  Wednesday 5:30 pm -  8:00 pm  Thursday 10:30 am - 1:30 pm  Sunday 10:30 am - 1:00 pm    [facility]  [address], [place] [address]  Non-perishable food items can be self served 24 hours a day  from this self serve stand outside the Religious  please take only what you need right now!  [facility] Food Pantry - [address]  [address] .  [place] [address]  [phone]  Monday-Thursday 10am-1pm    TRANSPORTATION  [facility] Transport (BCT)  Telephone: [phone]    'S ASSISTANCE  Dept. of 's Affairs Regional Office  Telephone: [phone]    Peer and Self-Help Support Groups    Al-Anofranki   1-965.545.6057 or 872-210-7838; for family members of people with addiction     Alcoholics Anonymous  1-206.298.6939 or 134-593-9505    Cocaine Anonymous  3-278-773-134 or 1-580.809.7024    Families Anonymous  1-752.251.4041; for the family and friends of those individuals with drug, alcohol or related behavioral issues     ALICIA of [facility] Support Groups   Provides online support groups,  individual and family support, education and advocacy. Helpline: 1-429.850.2215 (9am-9pm)     Narcotics Anonymous  1-641.690.6351    [facility]  P: [phone] (Mental Health and Addiction)     The [facility] Room   an LGBTQIA youth program that provides a supportive and empowering environment for youth ages 14-21. Weekly meetings are a free and confidential place where you can learn, build support networks, and have fun. 549.808.6266 or [email or web address]    Reach Out [facility]  P: [phone] (Mental Health and Addiction)      [facility] 2-1-1:   Call: Gus or 174-000-5452 Website: http://www.MediaScrape/  This is a toll free, confidential; 24-hour-a-day service which connects you to a community  in your area who can help you find services and resources that are available to you locally and provide critical services that can  improve and save lives.     National Suicide Prevention Hotline  1-564.162.6131    National de Prevencion del Suicidio  1-461.408.8434    [place] Drug & Alcohol Helpline  1-902.321.1258    Crisis Text Line is free, 24/7 support for those in crisis. Text HOME to 453491 from anywhere in the [place] to text with a trained Crisis Counselor.

## 2024-01-08 NOTE — CASE MANAGEMENT
CM called Pt's  Joey @[phone] to inform him of the Pt's admission and discharge date. Joey stated he has been unable to talk much about the Pt's treatment as he has been left out for a lot of it. CM informed Joey about the [facility] process and confidentiality. Joey stated that he is concerned about the Pt having an increase in her medication since she just started it in December 2023. Ferdinand stated that she appeared to have several good days and then when a bad day came about she wanted to end her life. CM discussed the process in which that provider increased the medication and the Pt is then talked with daily to see if there are any negative side effects which then prompt a medication changes or adjustment again. CM stated that the Pt was increased on 1/5/2024 and has been doing well so far. CM stated that if the Pt were to experience any negative side effects, she can contact her provider or crisis if severe again. Ferdinand stated that the Pt was able to say that she should have called someone. Ferdinand stated that they have two older children who have an understanding of what is going on and are concerned as well. CM stated that the Pt will be given supports to contact in case she needs them again. CM stated that they want to look into coming up with a crisis plan for if this is to happen again. Ferdinand stated that he is hopeful the medication helps and isn't making things worse. Ferdinand stated that he will be coming to get the Pt. CM stated that she will be ready as early as 10am. Ferdinand stated he will be here at 10am tomorrow.     CM met with Pt to confirm discharge planning. CM stated that they spoke with her  and he confirmed he is getting her for discharge tomorrow 1/9/2024. CM stated that her  is concerned about her medication. CM stated that if she were to be concerned at all, there will be crisis information in her AVS and she can consult with her psychiatrist. Pt stated that she has follow  up with them. Pt asked if the CM knew of support groups in the area for women going through a divorce. CM stated that they do not but they suggest looking at local churches or a search on Facebook. CM stated she can research things she can do to help continue to build her coping skills. Pt verbalized understanding and stated she is ready for discharge planning.

## 2024-01-08 NOTE — PROGRESS NOTES
Progress Note - Behavioral Health   Barbie Garcia 33 y.o. female MRN: [id number]  Unit/Bed#: [facility] 214-02 Encounter: [id number]    Assessment:  Principal Problem:    Severe episode of recurrent major depressive disorder, without psychotic features (HCC)  Active Problems:    Hypothyroidism    Mild tetrahydrocannabinol (THC) abuse    Medical clearance for psychiatric admission    Obesity (BMI 30-39.9)      Plan:  --Discharge tomorrow  --Continue with psychiatric hospitalization  --Continue with individual, group, and milieu therapy  --Continue the following medications:  Current Facility-Administered Medications   Medication Dose Route Frequency    acetaminophen (TYLENOL) tablet 650 mg  650 mg Oral Q6H PRN    acetaminophen (TYLENOL) tablet 650 mg  650 mg Oral Q4H PRN    acetaminophen (TYLENOL) tablet 975 mg  975 mg Oral Q6H PRN    aluminum-magnesium hydroxide-simethicone (MAALOX) oral suspension 30 mL  30 mL Oral Q4H PRN    artificial tear (LUBRIFRESH P.M.) ophthalmic ointment   Both Eyes 4x Daily PRN    benztropine (COGENTIN) injection 1 mg  1 mg Intramuscular BID PRN    benztropine (COGENTIN) tablet 1 mg  1 mg Oral BID PRN    bisacodyl (DULCOLAX) rectal suppository 10 mg  10 mg Rectal Daily PRN    hydrOXYzine HCL (ATARAX) tablet 50 mg  50 mg Oral Q6H PRN Max 4/day    Or    diphenhydrAMINE (BENADRYL) injection 50 mg  50 mg Intramuscular Q6H PRN    escitalopram (LEXAPRO) tablet 10 mg  10 mg Oral Daily    hydrOXYzine HCL (ATARAX) tablet 100 mg  100 mg Oral Q6H PRN Max 4/day    Or    LORazepam (ATIVAN) injection 2 mg  2 mg Intramuscular Q6H PRN    hydrOXYzine HCL (ATARAX) tablet 25 mg  25 mg Oral Q6H PRN Max 4/day    levothyroxine tablet 50 mcg  50 mcg Oral Early Morning    magnesium hydroxide (MILK OF MAGNESIA) oral suspension 30 mL  30 mL Oral Daily PRN    OLANZapine (ZyPREXA) tablet 10 mg  10 mg Oral Q3H PRN Max 3/day    Or    OLANZapine (ZyPREXA) IM injection 10 mg  10 mg Intramuscular Q3H PRN Max 3/day     OLANZapine (ZyPREXA) tablet 5 mg  5 mg Oral Q3H PRN Max 6/day    Or    OLANZapine (ZyPREXA) IM injection 5 mg  5 mg Intramuscular Q3H PRN Max 6/day    OLANZapine (ZyPREXA) tablet 2.5 mg  2.5 mg Oral Q3H PRN Max 8/day    senna-docusate sodium (SENOKOT S) 8.6-50 mg per tablet 1 tablet  1 tablet Oral Daily PRN    traZODone (DESYREL) tablet 50 mg  50 mg Oral HS PRN       Subjective: Patient was seen for continuation of care. Chart was reviewed and discussed with treatment team.     No acute behavioral events over the past 24 hours. Today, patient was seen and examined at bedside for continuation of care.     Today, patient states that she feels better.  She states that her mood has returned to baseline.  She is not having any suicidal thoughts whatsoever.  She is cooperative, calm, and visible in the milieu.  She is tolerating Lexapro well without side effects.  She demonstrates future oriented thinking and is talking about being discharged.  We discussed plan to discharge patient home tomorrow and she is agreeable with this.    Patient denied adverse effects to their psychiatric medication regimen. Patient denied other new or worsening psychiatric symptoms/complaints at this time. Discussed the importance of continuing to take medications as prescribed, as well as the importance of continuing to attend groups on the unit.     Psychiatric Review of Systems:  Medication adverse effects: none  Sleep: unchanged  Appetite: unchanged  Behavior over the past 24 hours: as per above    Vitals:  Vitals:    01/08/24 0802   BP: 139/81   Pulse: 82   Resp: 17   Temp: 98.2 °F (36.8 °C)   SpO2:        Laboratory results:    I have personally reviewed all pertinent laboratory/tests results  No results found for this or any previous visit (from the past 48 hour(s)).     Current Medications:  Current medications as per above. All medications have been reviewed.   Risks, benefits, alternatives, and possible side effects of patient's  "psychiatric medications were discussed with patient.     Mental Status Evaluation:  Appearance: casually dressed, appears consistent with stated age  Motor: no psychomotor disturbances, no gait abnormalities  Behavior: cooperative, interacts with this writer appropriately  Speech: normal rate, rhythm, and volume  Mood: \"better\"  Affect: euthymic, normal range and intensity  Thought Process: organized, linear, and goal-oriented  Thought Content: denies auditory hallucinations, denies visual hallucinations, denies delusions  Risk Potential: denies suicidal ideation, plan, or intent. Denies homicidal ideation  Sensorium: Oriented to person, place, time, and situation  Cognition: cognitive ability appears intact but was not quantitatively tested  Consciousness: alert and awake  Attention: able to focus without difficulty  Insight: improved  Judgement: improved      Progress Toward Goals & Illness Status: Patient is not at goal. They are not yet ready for discharge. The patient's condition currently requires active psychopharmacological medication management, interdisciplinary coordination with case management, and the utilization of adjunctive milieu and group therapy to augment psychopharmacological efficacy. The patient's risk of morbidity, and progression or decompensation of psychiatric disease, is higher without this current treatment.       This note has been constructed using a voice recognition system. There may be translation, syntax, or grammatical errors. If you have any questions, please contact the dictating provider.    "

## 2024-01-08 NOTE — DISCHARGE INSTR - APPOINTMENTS
You have confirmed and will be discharged to address [address] [place] [address].  You have confirmed and will be contacted at phone number [phone].  Your  while you were at [facility] was Vickey MCNULTY who can be reached at phone number [phone] and fax number [phone].    Linnea, or Neeru, our Behavioral Health Nurse Navigators, will be calling you after your discharge, on the phone number that you provided.  They will be available as an additional support, if needed.   If you wish to speak with one of them, you may contact Linnea at [phone] or Neeru at [phone].

## 2024-01-08 NOTE — PROGRESS NOTES
01/05/24 1400   Team Meeting   Meeting Type Tx Team Meeting   Initial Conference Date 01/05/24   Next Conference Date 02/02/24   Team Members Present   Team Members Present Physician;Nurse;   Physician Team Member Dr. Noriega   Nursing Team Member Regan   Care Management Team Member Doreen   Patient/Family Present   Patient Present No  (Pt declined to meet with treatment team.)   Patient's Family Present No       Reviewed diagnosis of severe episode of recurrent major depressive disorder, without psychotic features.   Discussed short term goals of decrease in depressive symptoms, decrease in self abusive behaviors.  No discharge date set at this time.  All parties are in agreement and treatment plan was signed.

## 2024-01-08 NOTE — PROGRESS NOTES
01/08/24 0750   Team Meeting   Meeting Type Daily Rounds   Team Members Present   Team Members Present Physician;Nurse;;Other (Discipline and Name)   Physician Team Member Dr. Busby / Dr. Noriega / JENNA Sahni / PA Student   Nursing Team Member Regan / Susana   Care Management Team Member Doreen / Caroline / Teagan   Other (Discipline and Name) Jersonund (Group Facilitator)   Patient/Family Present   Patient Present No   Patient's Family Present No       Status: Pt is pleasant and cooperative. Pt is denying SI, HI, and AVH. Pt is looking forward to discharge and seeing her children. Pt slept through the night.     Medication: No medication changes and no PRNs given.     D/C: Pt is scheduled to be discharged on 1/9/2024.

## 2024-01-08 NOTE — PLAN OF CARE
Problem: DISCHARGE PLANNING  Goal: Discharge to home or other facility with appropriate resources  Description: INTERVENTIONS:  - Identify barriers to discharge w/patient and caregiver  - Arrange for needed discharge resources and transportation as appropriate  - Identify discharge learning needs (meds, wound care, etc.)  - Arrange for interpretive services to assist at discharge as needed  - Refer to Case Management Department for coordinating discharge planning if the patient needs post-hospital services based on physician/advanced practitioner order or complex needs related to functional status, cognitive ability, or social support system  Outcome: Adequate for Discharge  Note: Pt is scheduled to be discharged on 1/9/2023 to return home. Pt is being discharged with [facility] through [facility].

## 2024-01-08 NOTE — PLAN OF CARE
Pt received in her room sitting on the bed reading. Pt is awake, alert and oriented x 4.  Pt did not get a good amount of sleep last night. Pt stated that it is okay because she used to work nights so the room light being on does not disturb her.  Pt denies SI/HI/AVH. Pt denies depression but has slight anxiety.  Pt states that her appetite is good.  No signs and symptoms of distress noted.

## 2024-01-09 PROCEDURE — 99239 HOSP IP/OBS DSCHRG MGMT >30: CPT | Performed by: PSYCHIATRY & NEUROLOGY

## 2024-01-09 RX ORDER — ESCITALOPRAM OXALATE 10 MG/1
10 TABLET ORAL DAILY
Qty: 30 TABLET | Refills: 0 | Status: SHIPPED | OUTPATIENT
Start: 2024-01-09 | End: 2024-02-08

## 2024-01-09 RX ADMIN — LEVOTHYROXINE SODIUM 50 MCG: 25 TABLET ORAL at 05:44

## 2024-01-09 RX ADMIN — ESCITALOPRAM OXALATE 10 MG: 10 TABLET ORAL at 09:03

## 2024-01-09 NOTE — PLAN OF CARE
Problem: SELF HARM/SUICIDALITY  Goal: Will have no self-injury during hospital stay  Description: INTERVENTIONS:  - Q 15 MINUTES: Routine safety checks  - Q WAKING SHIFT & PRN: Assess risk to determine if routine checks are adequate to maintain patient safety  - Encourage patient to participate actively in care by formulating a plan to combat response to suicidal ideation, identify supports and resources  Outcome: Adequate for Discharge     Problem: DEPRESSION  Goal: Will be euthymic at discharge  Description: INTERVENTIONS:  - Administer medication as ordered  - Provide emotional support via 1:1 interaction with staff  - Encourage involvement in milieu/groups/activities  - Monitor for social isolation  Outcome: Adequate for Discharge     Problem: ANXIETY  Goal: Will report anxiety at manageable levels  Description: INTERVENTIONS:  - Administer medication as ordered  - Teach and encourage coping skills  - Provide emotional support  - Assess patient/family for anxiety and ability to cope  Outcome: Adequate for Discharge  Goal: By discharge: Patient will verbalize 2 strategies to deal with anxiety  Description: Interventions:  - Identify any obvious source/trigger to anxiety  - Staff will assist patient in applying identified coping technique/skills  - Encourage attendance of scheduled groups and activities  Outcome: Adequate for Discharge     Problem: PAIN - ADULT  Goal: Verbalizes/displays adequate comfort level or baseline comfort level  Description: Interventions:  - Encourage patient to monitor pain and request assistance  - Assess pain using appropriate pain scale  - Administer analgesics based on type and severity of pain and evaluate response  - Implement non-pharmacological measures as appropriate and evaluate response  - Consider cultural and social influences on pain and pain management  - Notify physician/advanced practitioner if interventions unsuccessful or patient reports new pain  Outcome: Adequate  for Discharge     Problem: DISCHARGE PLANNING  Goal: Discharge to home or other facility with appropriate resources  Description: INTERVENTIONS:  - Identify barriers to discharge w/patient and caregiver  - Arrange for needed discharge resources and transportation as appropriate  - Identify discharge learning needs (meds, wound care, etc.)  - Arrange for interpretive services to assist at discharge as needed  - Refer to Case Management Department for coordinating discharge planning if the patient needs post-hospital services based on physician/advanced practitioner order or complex needs related to functional status, cognitive ability, or social support system  Outcome: Adequate for Discharge

## 2024-01-09 NOTE — NURSING NOTE
Belongings to Pt. 01/04  1 pr. Pants  1 ea. Shirt  1 pr. Panties  1 ea. Bra  1 pr. Socks    (All above items placed in washer at admissions)    Belongings to Bin 01/04  1 pr. Sneakers w/laces  1 ea. Sweatshirt/hoodie with string    No other personal items with patient upon admission (I.e., wallet, keys, ID, handbag, etc.)  
Bin   None         Bedside  Barney crew neck lama   Fuzy socks x2  gray and maroon  Long sl black shirt with white and brown writing on it   Pink sweat pants   Maroon sweat pants    Under wear   
Met with patient in her room, awake and alert, very pleasant, smiling throughout interaction. Currently denies, SI, HI, Anxiety and Depression. States she feels ready for discharge and misses her children and dog. Barbie explained that when she got here she didn't want to be here. But has benefited a lot from group therapy. She states she wish she had access to groups like these on the outside. She further added that thinking positively has also helped to improved her mood and feels more equip to deal with life stressors after discharge. She reports sleep has improved and her appetite is good.   
Patient is a 201 from [facility] with a hx of OD on Polymed: Lisinopril, Propranolol and OTC sleep aid on 01/03/2024. Patient is currently going through a divorce and feels like things are progressing too fast. While she and sp still share the same home, he has moved on in another relationship and is now planning to take their kids on vacation with his new partner. Pt admits this is her first attempt at SI and also first inpatient stay. She also states that her sp and fiends are her only support system.  UDS negative. Patient pleasant and cooperative, currently denies SI, HI, admits to anxiety and depression. Lifetime CSSRS High, but recent is low. Patient stated she feels safe, MD made aware.   
Patient is a new admit  
Pt denies SI/HI/Avh reports ongoing mild anxiety and is looking forward to seeing her children again. She remains medication compliant and is pleasant during conversation.   
Pt expressed readiness for discharge, AVS reviewed and the patient denied any questions or concerns. She reports feeling improved from initial presentation to the unit. Denies thoughts to harm herself and reports feeling able to use coping skills upon departure from this unit. Patient walked out of facility safely by staff with belongings.    
Pt is pleasant and cooperative in approach.  Denies SI or any thoughts of self harm.  Expresses remorse for SA.  Admits to thinking about OD in the night leading up to SA.  Pt expresses hope for future and wanting to be there for her children.  Pt states she is struggling being away from her children but know she should be focusing on herself.  Writer encouraged pt to call her children to reassure them that pt is safe and being cared for so that pt can then focus on self for the remainder of her time on unit.    
Pt pleasant and cooperative. Pt denies anxiety and depression. Denies SI/HI/AVH. Pt denies any needs at present. Pt encouraged to come to staff with any needs.  
Pt received in bed asleep with easy respirations.  Pt is easily arousable.  Pt denies pain.  Pt denies SI/HI/AVH.  Pt states that her appetite and sleep are good.  Pt stated that her day went well.  Pt has no complaints.  No signs and symptoms of distress noted.    
Pt remains adherent with scheduled medications. Pt denies anxiety or depression. Denies SI/HI/AVH. Pt attending groups and reading in room. Denies any needs at this time.   
Pt resting comfortably in bed. Pt reports feeling well and denying SI/HI/AVH. Pt reports having difficulty sleeping during the night d/t her working night shift. Pt encouraged to attend group.  
none

## 2024-01-09 NOTE — DISCHARGE SUMMARY
Discharge Summary - Behavioral Health   Barbie Garcia 33 y.o. female MRN: [id number]  Unit/Bed#: -02 Encounter: [id number]     Admission Date: 1/4/2024         Discharge Date: 01/09/24    Attending Psychiatrist: Asim Noriega DO    Reason for Admission/HPI (as per admission documentation):     Per Dr. Chance note:  Per ED Physician: 33-year-old female with history of hypothyroidism, hypertension and obesity was brought by ambulance from a van after suicide attempts by intentional drug ingestion.  She ingested propranolol 20 mg #30; diphenhydramine 25 mg # up to 24 and possibly lisinopril 10 mg quantity unknown.  She is sleepy.  Able answer some questions and follow commands but has slurred speech.  Closes her eyes when not being asked.  Pulse ox stable at this time with elevated blood pressure.      Per Crisis Worker: Officer Jasen Clemens () filed a [facility] 302. Crisis confirmed with [facility] Delegate (Pritesh Baugh) to utilize the 302 was a back up incase the patient does not want to sign in once medically cleared. The 302 should be filed it patient attempts to leave the hospital or declines IP treatment on a 201. The copy of the suicide note is attached to the back up 302. Officer Sarath states that he spoke with patients  who will take care of patient's car and their children.      302 statement: I responded to [place] for a reported possible suicidal subject. Upon arrival, Barbie  was with EMS on the [facility] getting ready to be taken to the hospital for an apparent overdose. Open pill packets and bottles were found in her car. I also found a suicide note (attached).     On evaluation,     Barbie was calm and cooperative with interview.  She appeared dysphoric in  affect, exhibited linear and logical thought process.  She reports that she has been feeling depressed for the past several months along with poor sleep, difficulty concentrating.  Depressed  mood more than half the time, worsening over the past several months in the context of life stressors including financial stress and a pending divorce with her soon-to-be ex- having a new relationship.  Reports that she was feeling particularly stressed about her relationship with her  and felt hopeless and as though no one wanted her around.  Reports that she took the rest of her blood pressure and psychiatric medications in an attempt to kill herself.  Reports that she was hoping she would not wake up.  Is unsure how she is feeling that she survived.  Reports that at this time she does not feel suicidal and does not have any suicidal ideation, intention, or plan.  She reports that she has not had suicidal thoughts in many years since she was a teenager when she took an intentional overdose of medications.  Reports that she was not hospitalized at that time and has not been hospitalized for psychiatric reasons in the past.  Recently started treatment with a new psychotherapist and psychiatrist several weeks ago and was started on Lexapro and propranolol for depression and anxiety respectively.  Reports that she felt the medications were helping but the stress of her relationship was too much for her to cope with.  Reports that she does have some positive coping mechanisms in the form of going for a walk and engaging with friends at work.  Acknowledges that her depression and anxiety are a problem and has good insight into the fact that she requires further help for her depression.  We discussed the need for inpatient psychiatric treatment in the setting of worsening depression and suicide attempt.  She expressed understanding of the process and agreed to sign a voluntary 201 commitment for inpatient behavioral health treatment.  Denies SI/HI/AVH at this time.     On admission to Inpatient Psychiatric Unit:  Patient is a 33-year-old white female with a history of major depressive disorder, recurrent,  "severe, without psychotic features, and mild THC abuse, presenting voluntarily to inpatient BHU following an overdose attempt on blood pressure medication.     Symptoms prior to hospitalization include: Depressed mood, suicidal ideation with the aforementioned overdose attempt, decreased sleep, decreased concentration, hopelessness, helplessness, and fluctuating appetite.  Symptom severity is rated as severe.  Timeline is progressively worsening over the past few weeks.  Mitigating factors are none.  Exacerbating stressors are going through a divorce with her  but still living with him.     On initial psychiatric evaluation today, the patient endorses the aforementioned and above describes depressive symptomatology.  She states that her divorce is proceeding \"too quickly\" and the relationship stress is her chief stressor.  She was seeing a therapist/prescriber through a virtual vida and was started on Lexapro 5 mg with unknown effect.  We discussed that she should undergo a full trial of Lexapro before determining that she \"failed\" this medication, and she is agreeable to increasing the dose.  She denies manic or psychotic symptoms.  She denies other acute psychiatric concerns at this time.      Past Medical History:   Diagnosis Date    Allergic     Anxiety     Depression     Disease of thyroid gland     Hypertension     Hypothyroid     Obesity      Past Surgical History:   Procedure Laterality Date     SECTION      x 3    DILATION AND CURETTAGE OF UTERUS         Medications:    Current Facility-Administered Medications   Medication Dose Route Frequency Provider Last Rate    acetaminophen  650 mg Oral Q6H PRN Oscar Sahni PA-C      acetaminophen  650 mg Oral Q4H PRN Oscar Sahni PA-C      acetaminophen  975 mg Oral Q6H PRN Oscar Sahni PA-C      aluminum-magnesium hydroxide-simethicone  30 mL Oral Q4H PRN Oscar Sahni PA-C      artificial tear   Both Eyes 4x Daily PRN Oscar Sahni, " PA-C      benztropine  1 mg Intramuscular BID PRN [facility], PA-C      benztropine  1 mg Oral BID PRN [facility], PA-C      bisacodyl  10 mg Rectal Daily PRN [facility], PA-C      hydrOXYzine HCL  50 mg Oral Q6H PRN Max 4/day [facility], [facility]-C      Or    diphenhydrAMINE  50 mg Intramuscular Q6H PRN [facility], PA-C      escitalopram  10 mg Oral Daily Asim Noriega, DO      hydrOXYzine HCL  100 mg Oral Q6H PRN Max 4/day [facility], PA-C      Or    LORazepam  2 mg Intramuscular Q6H PRN [facility], PA-C      hydrOXYzine HCL  25 mg Oral Q6H PRN Max 4/day [facility], PA-C      levothyroxine  50 mcg Oral Early Morning Jess Mclaughlin, PA-C      magnesium hydroxide  30 mL Oral Daily PRN [facility], PA-C      OLANZapine  10 mg Oral Q3H PRN Max 3/day [facility], PA-C      Or    OLANZapine  10 mg Intramuscular Q3H PRN Max 3/day [facility], PA-C      OLANZapine  5 mg Oral Q3H PRN Max 6/day [facility], PA-C      Or    OLANZapine  5 mg Intramuscular Q3H PRN Max 6/day [facility], PA-C      OLANZapine  2.5 mg Oral Q3H PRN Max 8/day [facility], PA-C      senna-docusate sodium  1 tablet Oral Daily PRN [facility], PA-C      traZODone  50 mg Oral HS PRN [facility], PA-C         Allergies:     No Known Allergies    Objective     Vital signs in last 24 hours:    Temp:  [98.2 °F (36.8 °C)-98.4 °F (36.9 °C)] 98.4 °F (36.9 °C)  HR:  [82-96] 96  Resp:  [17] 17  BP: (120-139)/(81-88) 120/88    No intake or output data in the 24 hours ending 01/09/24 0732    Hospital Course:     Barbie was admitted to the inpatient psychiatric unit on 1/4/2024. During their hospitalization, Barbie was encouraged to attend groups and interact appropriately and positively with others in the milieu.     Barbie was started on the following psychiatric medications: Lexapro 10mg PO QD for depression and anxiety. These medications were titrated up to a therapeutic range as  "evidenced by a reduction in Barbie's reported psychiatric symptomatology. There were no side effects noted or reported throughout Barbie's psychiatric hospitalization.    At the time of discharge, there were no criteria present through which Barbie could be kept involuntarily for further psychiatric hospitalization. Patient was able to articulate a safety plan upon discharge home, including going to any ED if their symptoms return or worsen, or calling 911. Patient denied access to lethal means. We discussed mitigating factors against suicidal behavior, and the patient was able to articulate these mitigating factors which outweighed any potential exacerbating stressors. Patient explicitly denied suicidal ideation, plan, or intent. They explicitly stated they felt safe to return to the community.     An outpatient discharge/follow up plan was discussed and coordinated between Barbie, this writer, and case management team.    Specific discharge disposition: Home. Pt is being discharged with [facility] through [facility].      Mental Status at Time of Discharge:     Appearance: casually dressed, appears consistent with stated age  Motor: no psychomotor disturbances, no gait abnormalities  Behavior: cooperative, interacts with this writer appropriately  Speech: normal rate, rhythm, and volume  Mood: \"good\"  Affect: euthymic, normal range and intensity  Thought Process: organized, linear, and goal-oriented  Thought Content: denies auditory or visual hallucinations  Perception: denies delusions or other perceptual disturbances  Risk Potential: denies suicidal ideation, plan, or intent. Denies homicidal ideation  Sensorium: Oriented to person, place, time, and situation  Cognition: cognitive ability appears intact but was not quantitatively tested  Consciousness: alert and awake  Attention: able to focus without difficulty  Insight: improved  Judgement: improved       Suicide/Homicide Risk Assessment:    Risk of Harm " to Self:   Patient denied suicidal thoughts, intent, plan, or acts of furtherance at the time of discharge.    Risk of Harm to Others:  Patient denied homicidal thoughts or intent at the time of discharge      Admission Diagnosis:    Principal Problem:    Severe episode of recurrent major depressive disorder, without psychotic features (HCC)  Active Problems:    Hypothyroidism    Mild tetrahydrocannabinol (THC) abuse    Medical clearance for psychiatric admission    Obesity (BMI 30-39.9)      Discharge Diagnosis:     Principal Problem:    Severe episode of recurrent major depressive disorder, without psychotic features (HCC)  Active Problems:    Hypothyroidism    Mild tetrahydrocannabinol (THC) abuse    Medical clearance for psychiatric admission    Obesity (BMI 30-39.9)  Resolved Problems:    * No resolved hospital problems. *      Laboratory Results: I have personally reviewed all pertinent lab results.    No results found for this or any previous visit (from the past 48 hour(s)).     Discharge Medications:    See after visit summary for all reconciled discharge medications provided to patient and family.      Current Discharge Medication List        START taking these medications    Details   escitalopram (LEXAPRO) 10 mg tablet Take 1 tablet (10 mg total) by mouth daily  Qty: 30 tablet, Refills: 0    Associated Diagnoses: Severe episode of recurrent major depressive disorder, without psychotic features (HCC)              Current Discharge Medication List        STOP taking these medications       diphenhydrAMINE (BENADRYL) 25 mg capsule Comments:   Reason for Stopping:         lisinopril (ZESTRIL) 10 mg tablet Comments:   Reason for Stopping:         Multiple Vitamins-Minerals (MULTIVITAMIN ADULT PO) Comments:   Reason for Stopping:         propranolol (INDERAL) 10 mg tablet Comments:   Reason for Stopping:         acetaminophen (TYLENOL) 500 mg tablet Comments:   Reason for Stopping:         citalopram (CeleXA)  10 mg tablet Comments:   Reason for Stopping:                Current Discharge Medication List           Current Discharge Medication List        CONTINUE these medications which have NOT CHANGED    Details   levothyroxine 50 mcg tablet Take 1 tablet (50 mcg total) by mouth daily  Qty: 90 tablet, Refills: 0    Associated Diagnoses: Hypothyroidism, unspecified type              Discharge instructions/Information to patient and family:     See after visit summary for information provided to patient and family.      Provisions for Follow-Up Care:    See after visit summary for information related to follow-up care and any pertinent home health orders.      Discharge Statement:    I spent 35 minutes discharging the patient. This time was spent on the day of discharge. I had direct contact with the patient on the day of discharge.     Additional documentation is required if more than 30 minutes were spent on discharge:    I had face-to-face contact with the patient and discussed discharge treatment plan  I reviewed the importance of compliance with medications and outpatient treatment after discharge with the patient.  I discussed discharge and treatment plan with the patient, nursing, and case management/social work.  I discussed the medication regimen and possible side effects of the medications with the patient prior to discharge. At the time of discharge they were tolerating psychiatric medications.  I discussed outpatient follow up with the patient as per treatment plan / aftercare summary.  I reviewed elements of the aftercare plan with the patient. I discussed that if symptoms worsen or reoccur, that the patient can return to the emergency room or dial 911 in an emergency.  I reviewed pertinent mitigating vs exacerbating stressors, as well as other risk factors, as they relate to potential suicidal behavior.  I reviewed the patient's hospital course and current psychiatric disease status. As of today, they are now  at goal. They are psychiatrically stable for discharge home. The combination of active psychopharmacological medication management, interdisciplinary coordination with case management, and adjunctive milieu and group therapy to augment psychopharmacological efficacy appears to have been successful. The patient's risk of morbidity, and progression or decompensation of psychiatric disease, is lower today as compared to the day of admission.      Asim Noriega,  01/09/24

## 2024-01-09 NOTE — BH TRANSITION RECORD
Contact Information: If you have any questions, concerns, pended studies, tests and/or procedures, or emergencies regarding your inpatient behavioral health visit. Please contact [place] [facility] [phone] and ask to speak to a , nurse or physician. A contact is available 24 hours/ 7 days a week at this number.     Summary of Procedures Performed During your Stay:  Below is a list of major procedures performed during your hospital stay and a summary of results:  - No major procedures performed.    Pending Studies (From admission, onward)      None          Please follow up on the above pending studies with your PCP and/or referring provider.

## 2024-01-09 NOTE — PROGRESS NOTES
01/09/24 0750   Team Meeting   Meeting Type Daily Rounds   Team Members Present   Team Members Present Physician;Nurse;;Other (Discipline and Name)   Physician Team Member Dr. Busby / Dr. Noriega / JENNA Sahni / PA Student   Nursing Team Member Regan / Paxton   Care Management Team Member Doreen / Caroline / Teagan   Other (Discipline and Name) Pharmacist   Patient/Family Present   Patient Present No   Patient's Family Present No       Status: Pt is pleasant and cooperative. Pt is social and attending groups. Pt is denying SI, HI, and AVH. Pt is ready for discharged and slept through the night.     Medication: No medication changes and no PRNs given.     D/C: Pt is scheduled to be discharged today 1/9/2024 to go to [facility]. Pt is being picked up at 10am via [facility]. Pt is being discharged

## 2024-01-09 NOTE — PROGRESS NOTES
01/08/24 1720   Activity/Group Checklist   Group Admission/Discharge  (Relapse Prevention Plan)   Attendance Attended   Attendance Duration (min) 16-30   Interactions Interacted appropriately   Affect/Mood Appropriate;Bright   Goals Achieved Identified feelings;Discussed coping strategies;Discussed discharge plans;Discussed self-esteem issues;Identified resources and support systems;Able to reflect/comment on own behavior;Able to self-disclose;Able to recieve feedback;Able to give feedback to another

## 2024-01-09 NOTE — CASE MANAGEMENT
CM met with Pt to confirm discharge planning. CM stated that they Pt's  is supposed to be picking her up around 10am today. CM stated that they put her return to work letter in her discharge folder. CM stated that their contact information is in the AVS in case she needs more information for her leave. Pt verbalized understanding and stated she is ready for discharge.

## 2024-01-17 NOTE — CASE MANAGEMENT
CM received medical leave paperwork. CM completed paperwork and faxed it back to [facility] for the Pt.

## 2024-02-10 DIAGNOSIS — E03.9 HYPOTHYROIDISM, UNSPECIFIED TYPE: ICD-10-CM

## 2024-02-12 RX ORDER — LEVOTHYROXINE SODIUM 0.05 MG/1
50 TABLET ORAL DAILY
Qty: 90 TABLET | Refills: 0 | Status: SHIPPED | OUTPATIENT
Start: 2024-02-12

## 2024-03-14 LAB
ATRIAL RATE: 84 BPM
ATRIAL RATE: 88 BPM
P AXIS: 33 DEGREES
P AXIS: 37 DEGREES
PR INTERVAL: 126 MS
PR INTERVAL: 142 MS
QRS AXIS: 55 DEGREES
QRS AXIS: 56 DEGREES
QRSD INTERVAL: 72 MS
QRSD INTERVAL: 74 MS
QT INTERVAL: 360 MS
QT INTERVAL: 364 MS
QTC INTERVAL: 430 MS
QTC INTERVAL: 435 MS
T WAVE AXIS: 50 DEGREES
T WAVE AXIS: 58 DEGREES
VENTRICULAR RATE: 84 BPM
VENTRICULAR RATE: 88 BPM

## 2024-03-20 ENCOUNTER — OFFICE VISIT (OUTPATIENT)
Dept: GYNECOLOGY | Facility: CLINIC | Age: 34
End: 2024-03-20
Payer: COMMERCIAL

## 2024-03-20 VITALS
HEIGHT: 62 IN | WEIGHT: 200.2 LBS | DIASTOLIC BLOOD PRESSURE: 72 MMHG | BODY MASS INDEX: 36.84 KG/M2 | SYSTOLIC BLOOD PRESSURE: 110 MMHG

## 2024-03-20 DIAGNOSIS — Z11.3 SCREEN FOR STD (SEXUALLY TRANSMITTED DISEASE): Primary | ICD-10-CM

## 2024-03-20 DIAGNOSIS — Z01.419 WOMEN'S ANNUAL ROUTINE GYNECOLOGICAL EXAMINATION: ICD-10-CM

## 2024-03-20 DIAGNOSIS — Q51.3 BICORNUATE UTERUS: ICD-10-CM

## 2024-03-20 DIAGNOSIS — Z11.51 SCREENING FOR HUMAN PAPILLOMAVIRUS (HPV): ICD-10-CM

## 2024-03-20 PROCEDURE — 87591 N.GONORRHOEAE DNA AMP PROB: CPT | Performed by: OBSTETRICS & GYNECOLOGY

## 2024-03-20 PROCEDURE — G0145 SCR C/V CYTO,THINLAYER,RESCR: HCPCS | Performed by: OBSTETRICS & GYNECOLOGY

## 2024-03-20 PROCEDURE — S0610 ANNUAL GYNECOLOGICAL EXAMINA: HCPCS | Performed by: OBSTETRICS & GYNECOLOGY

## 2024-03-20 PROCEDURE — 87491 CHLMYD TRACH DNA AMP PROBE: CPT | Performed by: OBSTETRICS & GYNECOLOGY

## 2024-03-20 RX ORDER — ACETAMINOPHEN AND CODEINE PHOSPHATE 120; 12 MG/5ML; MG/5ML
1 SOLUTION ORAL DAILY
COMMUNITY

## 2024-03-20 NOTE — PROGRESS NOTES
Assessment/Plan   Diagnoses and all orders for this visit:    Women's annual routine gynecological examination    Bicornuate uterus    Other orders  -     norethindrone (MICRONOR) 0.35 MG tablet; Take 1 tablet by mouth daily    1. yearly exam-Pap smear done with HPV testing, self breast awareness reviewed  2. contraception-currently on minipill.  Overall, she is doing okay with this but she does not remember to take it regularly.  She had headaches on Seasonale in the past, had emotional symptoms on patch and got pregnant on NuvaRing.  She is concerned about weight gain with Depo-Provera.  Has bicornate uterus so IUD is not optimal.  She is quite interested in Nexplanon.  She was counseled in detail about this.  Nexplanon pamphlet was given.  Nexplanon model was shown to her.  She will proceed with this.  We will check for insurance company coverage.  She will return in the next few weeks for Nexplanon insertion.  3. history of bicornate uterus-did have history of SAB x 3 at around 12 weeks gestation  4. history of  x 2//third -the  was a 26-week stillborn.  5. history of hypothyroidism/breast/skin rash/history of depression/history of THC use/overdose-no current concerns  6. STD-interested in testing.  Has been with her partner for 3 months.  GC/chlamydia done today with Pap test.  She declines blood testing.  7.  Other-is  from the father of her children.  They still live together for financial reasons and it is awkward.  My support was given.  Follow-up near future for Nexplanon insertion or as needed.    Subjective   Patient ID: Barbie Garcia is a 33 y.o. female.    Vitals:    24 1405   BP: 110/72     Patient was seen today for new patient yearly exam.  Please see assessment plan for details.  She was last seen 2018.      The following portions of the patient's history were reviewed and updated as appropriate: allergies, current medications, past family history,  past medical history, past social history, past surgical history, and problem list.  Past Medical History:   Diagnosis Date    Allergic     Anxiety     Depression     Disease of thyroid gland     Hypertension     Hypothyroid     Obesity      Past Surgical History:   Procedure Laterality Date     SECTION      x 3    DILATION AND CURETTAGE OF UTERUS       OB History    Para Term  AB Living   7 3 3   4 3   SAB IAB Ectopic Multiple Live Births   4       3      # Outcome Date GA Lbr Buddy/2nd Weight Sex Delivery Anes PTL Lv   7 SAB            6 SAB            5 SAB            4 SAB            3 Term            2 Term            1 Term               Obstetric Comments   Miscarriage       Current Outpatient Medications:     levothyroxine 50 mcg tablet, Take 1 tablet (50 mcg total) by mouth daily, Disp: 90 tablet, Rfl: 0    norethindrone (MICRONOR) 0.35 MG tablet, Take 1 tablet by mouth daily, Disp: , Rfl:     escitalopram (LEXAPRO) 10 mg tablet, Take 1 tablet (10 mg total) by mouth daily, Disp: 30 tablet, Rfl: 0  No Known Allergies  Social History     Socioeconomic History    Marital status: /Civil Union     Spouse name: Joey    Number of children: 3    Years of education: None    Highest education level: None   Occupational History    Occupation: [employer]     Employer: [employer]   Tobacco Use    Smoking status: Former     Current packs/day: 0.00     Average packs/day: 0.3 packs/day for 1 year (0.3 ttl pk-yrs)     Types: Cigarettes     Start date:      Quit date:      Years since quittin.2    Smokeless tobacco: Never    Tobacco comments:     Only smoked 1-2 cigs per day at most   Vaping Use    Vaping status: Never Used   Substance and Sexual Activity    Alcohol use: Yes     Comment: social    Drug use: Yes     Types: Marijuana    Sexual activity: Yes     Partners: Male     Birth control/protection: OCP   Other Topics Concern    None   Social History Narrative    Always uses seat  belt    Caffeine use    No Confucianist beliefs     Social Determinants of Health     Financial Resource Strain: Not on file   Food Insecurity: No Food Insecurity (1/8/2024)    Hunger Vital Sign     Worried About Running Out of Food in the Last Year: Never true     Ran Out of Food in the Last Year: Never true   Transportation Needs: No Transportation Needs (1/8/2024)    PRAPARE - Transportation     Lack of Transportation (Medical): No     Lack of Transportation (Non-Medical): No   Physical Activity: Not on file   Stress: Not on file   Social Connections: Not on file   Intimate Partner Violence: Not At Risk (1/8/2024)    Humiliation, Afraid, Rape, and Kick questionnaire     Fear of Current or Ex-Partner: No     Emotionally Abused: No     Physically Abused: No     Sexually Abused: No   Housing Stability: Low Risk  (1/8/2024)    Housing Stability Vital Sign     Unable to Pay for Housing in the Last Year: No     Number of Places Lived in the Last Year: 1     Unstable Housing in the Last Year: No     Family History   Problem Relation Age of Onset    Skin cancer Mother     Cancer Mother     No Known Problems Father     Diabetes Family        Review of Systems   Constitutional:  Negative for chills, diaphoresis, fatigue and fever.   Respiratory:  Negative for apnea, cough, chest tightness, shortness of breath and wheezing.    Cardiovascular:  Negative for chest pain, palpitations and leg swelling.   Gastrointestinal:  Negative for abdominal distention, abdominal pain, anal bleeding, constipation, diarrhea, nausea, rectal pain and vomiting.   Genitourinary:  Negative for difficulty urinating, dyspareunia, dysuria, frequency, hematuria, menstrual problem, pelvic pain, urgency, vaginal bleeding, vaginal discharge and vaginal pain.   Musculoskeletal:  Negative for arthralgias, back pain and myalgias.   Skin:  Negative for color change and rash.   Neurological:  Negative for dizziness, syncope, light-headedness, numbness and  "headaches.   Hematological:  Negative for adenopathy. Does not bruise/bleed easily.   Psychiatric/Behavioral:  Negative for dysphoric mood and sleep disturbance. The patient is not nervous/anxious.        Objective   Physical Exam  OBGyn Exam     Objective      /72 (BP Location: Right arm, Patient Position: Sitting)   Ht 5' 2\" (1.575 m)   Wt 90.8 kg (200 lb 3.2 oz)   LMP 02/28/2024   BMI 36.62 kg/m²     General:   alert and oriented, in no acute distress   Neck: normal to inspection and palpation   Breast: normal appearance, no masses or tenderness   Heart:    Lungs:    Abdomen: soft, non-tender, without masses or organomegaly   Vulva: normal   Vagina: Without erythema or lesions or discharge.  Normal   Cervix: Without lesions or discharge or cervicitis.  No Cervical motion tenderness   Uterus: top normal size, anteverted, non-tender   Adnexa: no mass, fullness, tenderness   Rectum: negative    Psych:  Normal mood and affect   Skin:  Without obvious lesions   Eyes: symmetric, with normal movements and reactivity   Musculoskeletal:  Normal muscle tone and movements appreciated       "

## 2024-03-21 ENCOUNTER — TELEPHONE (OUTPATIENT)
Dept: GYNECOLOGY | Facility: CLINIC | Age: 34
End: 2024-03-21

## 2024-03-21 NOTE — TELEPHONE ENCOUNTER
Spoke with Carolann from patient's BC plan. Codes  (nexplanon) and 18861(nexplanon insertion) are covered by patient's plan at 100% with no deductible and no copay. No authorization is required. Call reference number #[id number]      ----- Message from Oscar Shaw MD sent at 3/20/2024  2:29 PM EDT -----  Please check for Nexplanon coverage.  Scheduled for insertion in the next few weeks.

## 2024-03-22 LAB
C TRACH DNA SPEC QL NAA+PROBE: NEGATIVE
N GONORRHOEA DNA SPEC QL NAA+PROBE: NEGATIVE

## 2024-03-27 ENCOUNTER — TELEPHONE (OUTPATIENT)
Age: 34
End: 2024-03-27

## 2024-03-27 DIAGNOSIS — Z11.51 SCREENING FOR HUMAN PAPILLOMAVIRUS (HPV): Primary | ICD-10-CM

## 2024-03-27 LAB
HPV HR 12 DNA CVX QL NAA+PROBE: NEGATIVE
HPV16 DNA CVX QL NAA+PROBE: NEGATIVE
HPV18 DNA CVX QL NAA+PROBE: NEGATIVE
LAB AP GYN PRIMARY INTERPRETATION: NORMAL
LAB AP LMP: NORMAL
Lab: NORMAL

## 2024-03-27 PROCEDURE — 87624 HPV HI-RISK TYP POOLED RSLT: CPT | Performed by: OBSTETRICS & GYNECOLOGY

## 2024-03-27 NOTE — TELEPHONE ENCOUNTER
Called lab, said HPV can be added to the pap. Said to order lab [id number] then push the order through and print the label. Dr Shaw wanted the HPV added to this pap. Can someone from the office please put this in? Thanks!

## 2024-04-24 ENCOUNTER — PROCEDURE VISIT (OUTPATIENT)
Dept: GYNECOLOGY | Facility: CLINIC | Age: 34
End: 2024-04-24
Payer: COMMERCIAL

## 2024-04-24 VITALS — WEIGHT: 199 LBS | SYSTOLIC BLOOD PRESSURE: 114 MMHG | BODY MASS INDEX: 36.4 KG/M2 | DIASTOLIC BLOOD PRESSURE: 76 MMHG

## 2024-04-24 DIAGNOSIS — Z01.818 PREOPERATIVE TESTING: ICD-10-CM

## 2024-04-24 DIAGNOSIS — Z30.017 NEXPLANON INSERTION: ICD-10-CM

## 2024-04-24 DIAGNOSIS — Z30.017 ENCOUNTER FOR INITIAL PRESCRIPTION OF IMPLANTABLE SUBDERMAL CONTRACEPTIVE: Primary | ICD-10-CM

## 2024-04-24 PROBLEM — Z97.5 NEXPLANON IN PLACE: Status: ACTIVE | Noted: 2024-04-24

## 2024-04-24 LAB — SL AMB POCT URINE HCG: NEGATIVE

## 2024-04-24 PROCEDURE — 11981 INSERTION DRUG DLVR IMPLANT: CPT | Performed by: OBSTETRICS & GYNECOLOGY

## 2024-04-24 PROCEDURE — 81025 URINE PREGNANCY TEST: CPT | Performed by: OBSTETRICS & GYNECOLOGY

## 2024-04-24 NOTE — PROGRESS NOTES
Assessment/Plan   Diagnoses and all orders for this visit:    Nexplanon insertion  -     Remove and insert drug implant    Preoperative testing  -     POCT urine HCG    1.  Nexplanon insertion-placed into the right arm as the patient is left-handed.  She tolerated the procedure well.  Light activity recommended for the right arm for the next few days.  Signs and symptoms of infection were reviewed, to call or return with any of them.  Follow-up 2 to 4 weeks for incision check or as needed.  2.  Previous contraception-difficulty remembering to take a pill.  Had headaches on Seasonale, emotional symptoms and pregnancy on NuvaRing.  Has bicornate uterus so IUD is not optimal.  Nexplanon was placed today.  3.  History of bicornate uterus-history of SAB x 3 around 12 weeks gestation  4.  History of  x 2/ for 26-week stillborn/third   5. history of hypothyroidism/skin rash/breast rash/depression/history of THC use/history of overdose-no current issues   6. STD-GC/chlamydia negative from previous visit 3/20/2024.  7.  Other- from the father of her children.  They still live together for financial reasons.  New partner for 3 to 4 months.  Follow-up 2 to 4 weeks for incision check or as needed.    Subjective   Patient ID: Barbie Garcia is a 33 y.o. female.    Vitals:    24 1436   BP: 114/76     HPI    The following portions of the patient's history were reviewed and updated as appropriate: allergies, current medications, past family history, past medical history, past social history, past surgical history, and problem list.  Past Medical History:   Diagnosis Date    Allergic     Anxiety     Depression     Disease of thyroid gland     Hypertension     Hypothyroid     Obesity      Past Surgical History:   Procedure Laterality Date     SECTION      x 3    DILATION AND CURETTAGE OF UTERUS       OB History    Para Term  AB Living   7 3 3   4 3   SAB IAB Ectopic  Multiple Live Births   4       3      # Outcome Date GA Lbr Buddy/2nd Weight Sex Delivery Anes PTL Lv   7 SAB            6 SAB            5 SAB            4 SAB            3 Term            2 Term            1 Term               Obstetric Comments   Miscarriage       Current Outpatient Medications:     levothyroxine 50 mcg tablet, Take 1 tablet (50 mcg total) by mouth daily, Disp: 90 tablet, Rfl: 0    norethindrone (MICRONOR) 0.35 MG tablet, Take 1 tablet by mouth daily, Disp: , Rfl:     escitalopram (LEXAPRO) 10 mg tablet, Take 1 tablet (10 mg total) by mouth daily, Disp: 30 tablet, Rfl: 0  No Known Allergies  Social History     Socioeconomic History    Marital status: /Civil Union     Spouse name: Joey    Number of children: 3    Years of education: None    Highest education level: None   Occupational History    Occupation: [employer]     Employer: [employer]   Tobacco Use    Smoking status: Former     Current packs/day: 0.00     Average packs/day: 0.3 packs/day for 1 year (0.3 ttl pk-yrs)     Types: Cigarettes     Start date:      Quit date:      Years since quittin.3    Smokeless tobacco: Never    Tobacco comments:     Only smoked 1-2 cigs per day at most   Vaping Use    Vaping status: Never Used   Substance and Sexual Activity    Alcohol use: Yes     Comment: social    Drug use: Yes     Types: Marijuana    Sexual activity: Yes     Partners: Male     Birth control/protection: OCP   Other Topics Concern    None   Social History Narrative    Always uses seat belt    Caffeine use    No Alevism beliefs     Social Determinants of Health     Financial Resource Strain: Not on file   Food Insecurity: No Food Insecurity (2024)    Hunger Vital Sign     Worried About Running Out of Food in the Last Year: Never true     Ran Out of Food in the Last Year: Never true   Transportation Needs: No Transportation Needs (2024)    PRAPARE - Transportation     Lack of Transportation (Medical): No     Lack of  Transportation (Non-Medical): No   Physical Activity: Not on file   Stress: Not on file   Social Connections: Not on file   Intimate Partner Violence: Not At Risk (1/8/2024)    Humiliation, Afraid, Rape, and Kick questionnaire     Fear of Current or Ex-Partner: No     Emotionally Abused: No     Physically Abused: No     Sexually Abused: No   Housing Stability: Low Risk  (1/8/2024)    Housing Stability Vital Sign     Unable to Pay for Housing in the Last Year: No     Number of Places Lived in the Last Year: 1     Unstable Housing in the Last Year: No     Family History   Problem Relation Age of Onset    Skin cancer Mother     Cancer Mother     No Known Problems Father     Diabetes Family        Review of Systems    Objective   Physical Exam  OBGyn Exam     Objective      /76 (BP Location: Right arm)   Wt 90.3 kg (199 lb)   LMP 04/17/2024   BMI 36.40 kg/m²     General:   alert and oriented, in no acute distress   Neck:    Breast:    Heart:    Lungs:    Abdomen: soft, non-tender, without masses or organomegaly   Vulva:    Vagina:    Cervix:    Uterus:    Adnexa:    Rectum:     Psych:  Normal mood and affect   Skin:  Without obvious lesions.  Nexplanon placed in right arm without difficulty.   Eyes: symmetric, with normal movements and reactivity   Musculoskeletal:  Normal muscle tone and movements appreciated

## 2024-04-24 NOTE — PROGRESS NOTES
"Universal Protocol:  Consent: Verbal consent obtained. Written consent obtained.  Risks and benefits: risks, benefits and alternatives were discussed  Consent given by: patient  Time out: Immediately prior to procedure a \"time out\" was called to verify the correct patient, procedure, equipment, support staff and site/side marked as required.  Timeout called at: 4/24/2024 2:46 PM.  Patient understanding: patient states understanding of the procedure being performed  Patient consent: the patient's understanding of the procedure matches consent given  Procedure consent: procedure consent matches procedure scheduled  Relevant documents: relevant documents present and verified  Test results: test results available and properly labeled  Site marked: the operative site was marked  Patient identity confirmed: verbally with patient  Remove and insert drug implant    Date/Time: 4/24/2024 2:46 PM    Performed by: Oscar Shaw MD  Authorized by: Oscar Shaw MD    Indication:     Indication: Insertion of non-biodegradable drug delivery implant    Pre-procedure:     Pre-procedure timeout performed: yes      Prepped with: chlorhexidine gluconate      Local anesthetic:  Lidocaine 1%    The site was cleaned and prepped in a sterile fashion: yes    Procedure:     Procedure:  Insertion    Small stab incision was made in arm: yes      Left/right:  Right    Preloaded contraceptive capsule trocar was placed subdermally: yes      Visualization of implant was obtained: yes      Contraceptive capsule was inserted and trocar removed: yes      Visualization of notch in stylet and palpation of device: yes      Palpation confirms placement by provider and patient: yes      Site was closed with steri-strips and pressure bandage applied: yes    Comments:      Area was cleansed with Hibiclens.  5 cc of 1% lidocaine was instilled in the posterior aspect of the right arm.  In a sterile fashion, Nexplanon was placed without difficulty.  Placement " confirmed by palpation with myself and the patient.  Steri-Strip and pressure bandage placed.  Activity instructions were reviewed.  Follow-up 2 to 4 weeks for incision check or as needed.

## 2024-05-10 DIAGNOSIS — E03.9 HYPOTHYROIDISM, UNSPECIFIED TYPE: ICD-10-CM

## 2024-05-10 RX ORDER — LEVOTHYROXINE SODIUM 0.05 MG/1
50 TABLET ORAL DAILY
Qty: 30 TABLET | Refills: 0 | Status: SHIPPED | OUTPATIENT
Start: 2024-05-10

## 2024-06-18 DIAGNOSIS — E03.9 HYPOTHYROIDISM, UNSPECIFIED TYPE: ICD-10-CM

## 2024-06-19 RX ORDER — LEVOTHYROXINE SODIUM 0.05 MG/1
50 TABLET ORAL DAILY
Qty: 30 TABLET | Refills: 0 | Status: SHIPPED | OUTPATIENT
Start: 2024-06-19

## 2024-07-18 DIAGNOSIS — E03.9 HYPOTHYROIDISM, UNSPECIFIED TYPE: ICD-10-CM

## 2024-07-18 RX ORDER — LEVOTHYROXINE SODIUM 0.05 MG/1
50 TABLET ORAL DAILY
Qty: 30 TABLET | Refills: 0 | OUTPATIENT
Start: 2024-07-18

## 2024-07-18 NOTE — TELEPHONE ENCOUNTER
Courtesy refill previously given.  Patient needs an appointment. Please contact the patient to schedule an appointment.

## 2024-07-19 DIAGNOSIS — E03.9 HYPOTHYROIDISM, UNSPECIFIED TYPE: ICD-10-CM

## 2024-07-19 RX ORDER — LEVOTHYROXINE SODIUM 0.05 MG/1
50 TABLET ORAL DAILY
Qty: 30 TABLET | Refills: 0 | Status: SHIPPED | OUTPATIENT
Start: 2024-07-19

## 2024-08-01 ENCOUNTER — TELEPHONE (OUTPATIENT)
Dept: FAMILY MEDICINE CLINIC | Facility: CLINIC | Age: 34
End: 2024-08-01

## 2024-08-02 ENCOUNTER — OFFICE VISIT (OUTPATIENT)
Dept: FAMILY MEDICINE CLINIC | Facility: CLINIC | Age: 34
End: 2024-08-02
Payer: COMMERCIAL

## 2024-08-02 VITALS
OXYGEN SATURATION: 97 % | WEIGHT: 205.2 LBS | SYSTOLIC BLOOD PRESSURE: 135 MMHG | TEMPERATURE: 100 F | BODY MASS INDEX: 37.76 KG/M2 | HEIGHT: 62 IN | HEART RATE: 82 BPM | DIASTOLIC BLOOD PRESSURE: 87 MMHG

## 2024-08-02 DIAGNOSIS — E03.9 HYPOTHYROIDISM, UNSPECIFIED TYPE: ICD-10-CM

## 2024-08-02 DIAGNOSIS — Z00.00 ANNUAL PHYSICAL EXAM: Primary | ICD-10-CM

## 2024-08-02 PROCEDURE — 99395 PREV VISIT EST AGE 18-39: CPT | Performed by: NURSE PRACTITIONER

## 2024-08-02 PROCEDURE — 3725F SCREEN DEPRESSION PERFORMED: CPT | Performed by: NURSE PRACTITIONER

## 2024-08-02 PROCEDURE — 99213 OFFICE O/P EST LOW 20 MIN: CPT | Performed by: NURSE PRACTITIONER

## 2024-08-02 RX ORDER — LEVOTHYROXINE SODIUM 0.05 MG/1
50 TABLET ORAL DAILY
Qty: 90 TABLET | Refills: 1 | Status: SHIPPED | OUTPATIENT
Start: 2024-08-02

## 2024-08-02 NOTE — PATIENT INSTRUCTIONS
"Patient Education     Routine physical for adults   The Basics   Written by the doctors and editors at [facility]   What is a physical? -- A physical is a routine visit, or \"check-up,\" with your doctor. You might also hear it called a \"wellness visit\" or \"preventive visit.\"  During each visit, the doctor will:   Ask about your physical and mental health   Ask about your habits, behaviors, and lifestyle   Do an exam   Give you vaccines if needed   Talk to you about any medicines you take   Give advice about your health   Answer your questions  Getting regular check-ups is an important part of taking care of your health. It can help your doctor find and treat any problems you have. But it's also important for preventing health problems.  A routine physical is different from a \"sick visit.\" A sick visit is when you see a doctor because of a health concern or problem. Since physicals are scheduled ahead of time, you can think about what you want to ask the doctor.  How often should I get a physical? -- It depends on your age and health. In general, for people age 21 years and older:   If you are younger than 50 years, you might be able to get a physical every 3 years.   If you are 50 years or older, your doctor might recommend a physical every year.  If you have an ongoing health condition, like diabetes or high blood pressure, your doctor will probably want to see you more often.  What happens during a physical? -- In general, each visit will include:   Physical exam - The doctor or nurse will check your height, weight, heart rate, and blood pressure. They will also look at your eyes and ears. They will ask about how you are feeling and whether you have any symptoms that bother you.   Medicines - It's a good idea to bring a list of all the medicines you take to each doctor visit. Your doctor will talk to you about your medicines and answer any questions. Tell them if you are having any side effects that bother you. You " "should also tell them if you are having trouble paying for any of your medicines.   Habits and behaviors - This includes:   Your diet   Your exercise habits   Whether you smoke, drink alcohol, or use drugs   Whether you are sexually active   Whether you feel safe at home  Your doctor will talk to you about things you can do to improve your health and lower your risk of health problems. They will also offer help and support. For example, if you want to quit smoking, they can give you advice and might prescribe medicines. If you want to improve your diet or get more physical activity, they can help you with this, too.   Lab tests, if needed - The tests you get will depend on your age and situation. For example, your doctor might want to check your:   Cholesterol   Blood sugar   Iron level   Vaccines - The recommended vaccines will depend on your age, health, and what vaccines you already had. Vaccines are very important because they can prevent certain serious or deadly infections.   Discussion of screening - \"Screening\" means checking for diseases or other health problems before they cause symptoms. Your doctor can recommend screening based on your age, risk, and preferences. This might include tests to check for:   Cancer, such as breast, prostate, cervical, ovarian, colorectal, prostate, lung, or skin cancer   Sexually transmitted infections, such as chlamydia and gonorrhea   Mental health conditions like depression and anxiety  Your doctor will talk to you about the different types of screening tests. They can help you decide which screenings to have. They can also explain what the results might mean.   Answering questions - The physical is a good time to ask the doctor or nurse questions about your health. If needed, they can refer you to other doctors or specialists, too.  Adults older than 65 years often need other care, too. As you get older, your doctor will talk to you about:   How to prevent falling at " home   Hearing or vision tests   Memory testing   How to take your medicines safely   Making sure that you have the help and support you need at home  All topics are updated as new evidence becomes available and our peer review process is complete.  This topic retrieved from [employer] on: May 02, 2024.  Topic 378822 Version 1.0  Release: 32.4.3 - C32.122  © 2024 UpToDate, Inc. and/or its affiliates. All rights reserved.  Consumer Information Use and Disclaimer   Disclaimer: This generalized information is a limited summary of diagnosis, treatment, and/or medication information. It is not meant to be comprehensive and should be used as a tool to help the user understand and/or assess potential diagnostic and treatment options. It does NOT include all information about conditions, treatments, medications, side effects, or risks that may apply to a specific patient. It is not intended to be medical advice or a substitute for the medical advice, diagnosis, or treatment of a health care provider based on the health care provider's examination and assessment of a patient's specific and unique circumstances. Patients must speak with a health care provider for complete information about their health, medical questions, and treatment options, including any risks or benefits regarding use of medications. This information does not endorse any treatments or medications as safe, effective, or approved for treating a specific patient. UpToDate, Inc. and its affiliates disclaim any warranty or liability relating to this information or the use thereof.The use of this information is governed by the Terms of Use, available at https://www.[employer]or[employer] Poweruwer.com/en/know/clinical-effectiveness-terms. 2024© UpToDate, Inc. and its affiliates and/or licensors. All rights reserved.  Copyright   © 2024 UpToDate, Inc. and/or its affiliates. All rights reserved.

## 2024-08-02 NOTE — PROGRESS NOTES
Adult Annual Physical  Name: Barbie Garcia      : 1990      MRN: [id number]  Encounter Provider: RADHA Garcia  Encounter Date: 2024   Encounter department: [facility]    Assessment & Plan   1. Annual physical exam  -     Lipid panel; Future; Expected date: 2025  -     Comprehensive metabolic panel; Future; Expected date: 2025  -     Lipid panel  -     Comprehensive metabolic panel  2. Hypothyroidism, unspecified type  Assessment & Plan:  Condition stable. Last TSH in normal range. TSH due 24. Takes Levothyroxine 50 mcg daily.  Orders:  -     TSH, 3rd generation with Free T4 reflex; Future  -     TSH, 3rd generation with Free T4 reflex  -     levothyroxine 50 mcg tablet; Take 1 tablet (50 mcg total) by mouth daily    Immunizations and preventive care screenings were discussed with patient today. Appropriate education was printed on patient's after visit summary.    Counseling:  Alcohol/drug use: discussed moderation in alcohol intake, the recommendations for healthy alcohol use, and avoidance of illicit drug use.  Dental Health: discussed importance of regular tooth brushing, flossing, and dental visits.  Injury prevention: discussed safety/seat belts, safety helmets, smoke detectors, carbon dioxide detectors, and smoking near bedding or upholstery.  Sexual health: discussed sexually transmitted diseases, partner selection, use of condoms, avoidance of unintended pregnancy, and contraceptive alternatives.  Exercise: the importance of regular exercise/physical activity was discussed. Recommend exercise 3-5 times per week for at least 30 minutes.          History of Present Illness     Adult Annual Physical:  Patient presents for annual physical. Annual physical and medical management. No concerns..     Diet and Physical Activity:  - Diet/Nutrition: well balanced diet.  - Exercise: 3-4 times a week on average.    Depression Screening:    - PHQ-9  Score: 4    General Health:  - Sleep: sleeps well.  - Hearing: normal hearing bilateral ears.  - Vision: goes for regular eye exams.  - Dental: regular dental visits.    /GYN Health:  - Follows with GYN: yes.   - History of STDs: no  - Contraception:. Has nexplanon      Advanced Care Planning:  - Has an advanced directive?: no    - Has a durable medical POA?: no    - ACP document given to patient?: yes      Review of Systems   Constitutional:  Negative for activity change, appetite change, chills, diaphoresis, fatigue and fever.   HENT:  Negative for congestion, dental problem, drooling, ear discharge, ear pain, facial swelling, hearing loss, mouth sores, nosebleeds, postnasal drip, rhinorrhea, sinus pressure, sinus pain, sneezing, sore throat, tinnitus, trouble swallowing and voice change.    Eyes:  Negative for photophobia, pain, discharge, redness, itching and visual disturbance.   Respiratory:  Negative for apnea, cough, choking, chest tightness, shortness of breath, wheezing and stridor.    Cardiovascular:  Negative for chest pain, palpitations and leg swelling.   Gastrointestinal:  Negative for abdominal distention, abdominal pain, anal bleeding, blood in stool, constipation, diarrhea, nausea, rectal pain and vomiting.   Endocrine: Negative for cold intolerance, heat intolerance, polydipsia, polyphagia and polyuria.   Genitourinary:  Negative for decreased urine volume, difficulty urinating, dyspareunia, dysuria, enuresis, flank pain, frequency, genital sores, hematuria, menstrual problem, pelvic pain, urgency, vaginal bleeding, vaginal discharge and vaginal pain.   Musculoskeletal:  Negative for arthralgias, back pain, gait problem, joint swelling, myalgias, neck pain and neck stiffness.   Skin:  Negative for color change, pallor, rash and wound.   Neurological:  Negative for dizziness, tremors, seizures, syncope, facial asymmetry, speech difficulty, weakness, light-headedness, numbness and headaches.  "  Hematological:  Negative for adenopathy. Does not bruise/bleed easily.   Psychiatric/Behavioral:  Negative for agitation, behavioral problems, confusion, decreased concentration, dysphoric mood, hallucinations, self-injury, sleep disturbance and suicidal ideas. The patient is not nervous/anxious and is not hyperactive.          Objective     /87   Pulse 82   Temp 100 °F (37.8 °C)   Ht 5' 2\" (1.575 m)   Wt 93.1 kg (205 lb 3.2 oz)   SpO2 97%   BMI 37.53 kg/m²     Physical Exam  Vitals and nursing note reviewed.   Constitutional:       General: She is not in acute distress.     Appearance: Normal appearance. She is obese. She is not ill-appearing, toxic-appearing or diaphoretic.   HENT:      Head: Normocephalic.      Right Ear: Tympanic membrane, ear canal and external ear normal. There is no impacted cerumen.      Left Ear: Tympanic membrane, ear canal and external ear normal. There is no impacted cerumen.      Nose: Nose normal. No congestion or rhinorrhea.      Mouth/Throat:      Mouth: Mucous membranes are moist.      Pharynx: Oropharynx is clear. No oropharyngeal exudate or posterior oropharyngeal erythema.   Eyes:      General: No scleral icterus.        Right eye: No discharge.         Left eye: No discharge.      Extraocular Movements: Extraocular movements intact.      Conjunctiva/sclera: Conjunctivae normal.      Pupils: Pupils are equal, round, and reactive to light.   Neck:      Vascular: No carotid bruit.   Cardiovascular:      Rate and Rhythm: Normal rate and regular rhythm.      Pulses: Normal pulses.      Heart sounds: Normal heart sounds. No murmur heard.     No friction rub. No gallop.   Pulmonary:      Effort: Pulmonary effort is normal. No respiratory distress.      Breath sounds: Normal breath sounds. No stridor. No wheezing, rhonchi or rales.   Chest:      Chest wall: No tenderness.   Abdominal:      General: Abdomen is flat. Bowel sounds are normal. There is no distension.      " Palpations: Abdomen is soft. There is no mass.      Tenderness: There is no abdominal tenderness. There is no right CVA tenderness, left CVA tenderness, guarding or rebound.      Hernia: No hernia is present.   Musculoskeletal:         General: No swelling, tenderness, deformity or signs of injury. Normal range of motion.      Cervical back: Normal range of motion and neck supple. No rigidity. No muscular tenderness.      Right lower leg: No edema.      Left lower leg: No edema.   Lymphadenopathy:      Cervical: No cervical adenopathy.   Skin:     General: Skin is warm.      Capillary Refill: Capillary refill takes less than 2 seconds.      Coloration: Skin is not jaundiced or pale.      Findings: No bruising, erythema, lesion or rash.   Neurological:      General: No focal deficit present.      Mental Status: She is alert and oriented to person, place, and time.      Cranial Nerves: No cranial nerve deficit.      Sensory: No sensory deficit.      Motor: No weakness.      Coordination: Coordination normal.      Gait: Gait normal.      Deep Tendon Reflexes: Reflexes normal.   Psychiatric:         Mood and Affect: Mood normal.         Behavior: Behavior normal.         Thought Content: Thought content normal.         Judgment: Judgment normal.         PHQ-2/9 Depression Screening    Little interest or pleasure in doing things: 0 - not at all  Feeling down, depressed, or hopeless: 1 - several days  Trouble falling or staying asleep, or sleeping too much: 1 - several days  Feeling tired or having little energy: 1 - several days  Poor appetite or overeatin - not at all  Feeling bad about yourself - or that you are a failure or have let yourself or your family down: 1 - several days  Trouble concentrating on things, such as reading the newspaper or watching television: 0 - not at all  Moving or speaking so slowly that other people could have noticed. Or the opposite - being so fidgety or restless that you have been  moving around a lot more than usual: 0 - not at all  Thoughts that you would be better off dead, or of hurting yourself in some way: 0 - not at all  PHQ-9 Score: 4  PHQ-9 Interpretation: No or Minimal depression

## 2024-12-04 DIAGNOSIS — E03.9 HYPOTHYROIDISM, UNSPECIFIED TYPE: ICD-10-CM

## 2024-12-05 RX ORDER — LEVOTHYROXINE SODIUM 50 UG/1
50 TABLET ORAL DAILY
Qty: 90 TABLET | Refills: 1 | Status: SHIPPED | OUTPATIENT
Start: 2024-12-05

## 2025-03-12 ENCOUNTER — ANNUAL EXAM (OUTPATIENT)
Dept: GYNECOLOGY | Facility: CLINIC | Age: 35
End: 2025-03-12
Payer: COMMERCIAL

## 2025-03-12 VITALS
WEIGHT: 212 LBS | HEIGHT: 62 IN | DIASTOLIC BLOOD PRESSURE: 70 MMHG | SYSTOLIC BLOOD PRESSURE: 116 MMHG | BODY MASS INDEX: 39.01 KG/M2

## 2025-03-12 DIAGNOSIS — N91.4 SECONDARY OLIGOMENORRHEA: ICD-10-CM

## 2025-03-12 DIAGNOSIS — Q51.3 BICORNUATE UTERUS: ICD-10-CM

## 2025-03-12 DIAGNOSIS — Z97.5 NEXPLANON IN PLACE: ICD-10-CM

## 2025-03-12 DIAGNOSIS — Z01.419 WOMEN'S ANNUAL ROUTINE GYNECOLOGICAL EXAMINATION: Primary | ICD-10-CM

## 2025-03-12 PROCEDURE — S0612 ANNUAL GYNECOLOGICAL EXAMINA: HCPCS | Performed by: OBSTETRICS & GYNECOLOGY

## 2025-03-12 NOTE — PROGRESS NOTES
Assessment & Plan   Diagnoses and all orders for this visit:    Women's annual routine gynecological examination    Nexplanon in place    Bicornuate uterus    Secondary oligomenorrhea    1. yearly exam-Pap smear deferred, self breast awareness reviewed  2.  Nexplanon-in good position in the right arm.  Patient is very happy with this device.  Counseled about 3-year FDA approval at this time, with plans removal and possible reinsertion 2027 being recommended.  3. secondary oligomenorrhea-notes rare spotting for 1 day every few months or so.  She is very happy.  To call or return with any issues.  4. previous history of heavy menses-resolved with Nexplanon.  5. prior contraception-had difficulty remembering to take a pill and had headaches on Seasonale.  She had emotional symptoms in pregnancy on NuvaRing.  Has bicornuate uterus so IUD is not optimal.  6. bicornate uterus-history of SAB x 3 around 12 weeks.  Had 26 weeks stillbirth vaginally.  7.  History of  x 2/ 26-week stillbirth/third   8.  History of hypothyroidism/skin rash/breast rash/depression/THC use/history overdose-no current concerns  9. STD-same partner for 9 months time.  GC/chlamydia negative from 3/20/2024.  She declines need for testing today.  10. other-children are ages 20, 17, and 14.  Currently, she lives with boyfriend but 14-year-old frequently comes over to see her.  Follow-up 1 year for yearly exam or as needed.    Subjective   Patient ID: Barbie Garcia is a 34 y.o. female.    Vitals:    25 1050   BP: 116/70     HPI    The following portions of the patient's history were reviewed and updated as appropriate: allergies, current medications, past family history, past medical history, past social history, past surgical history, and problem list.  Past Medical History:   Diagnosis Date    Allergic     Anxiety     Depression     Disease of thyroid gland     Hypertension     Hypothyroid     Obesity      Past  Surgical History:   Procedure Laterality Date     SECTION      x 3    DILATION AND CURETTAGE OF UTERUS       OB History    Para Term  AB Living   7 4 3 1 3 2   SAB IAB Ectopic Multiple Live Births   3    3      # Outcome Date GA Lbr Buddy/2nd Weight Sex Type Anes PTL Lv   7 Term 11     CS-Unspec   [place]   6 Term 09     CS-Unspec   DEC      Birth Comments: Breech   5 Term 12/15/04     CS-Unspec   [place]      Birth Comments: Breech   4   26w0d       FD   3 SAB            2 SAB            1 SAB               Obstetric Comments   Miscarriage       Current Outpatient Medications:     levothyroxine 50 mcg tablet, Take 1 tablet (50 mcg total) by mouth daily, Disp: 90 tablet, Rfl: 1    escitalopram (LEXAPRO) 10 mg tablet, Take 1 tablet (10 mg total) by mouth daily, Disp: 30 tablet, Rfl: 0    Current Facility-Administered Medications:     etonogestrel (NEXPLANON) subdermal implant 68 mg, 68 mg, Subdermal, Once every 3 years, , 68 mg at 24 1511  No Known Allergies  Social History     Socioeconomic History    Marital status: Single     Spouse name: Joey    Number of children: 3    Years of education: None    Highest education level: None   Occupational History    Occupation: DOOMORO     Employer: [employer]   Tobacco Use    Smoking status: Former     Current packs/day: 0.00     Average packs/day: 0.3 packs/day for 1 year (0.3 ttl pk-yrs)     Types: Cigarettes     Start date:      Quit date:      Years since quittin.2    Smokeless tobacco: Never    Tobacco comments:     Only smoked 1-2 cigs per day at most   Vaping Use    Vaping status: Never Used   Substance and Sexual Activity    Alcohol use: Yes     Comment: social    Drug use: Yes     Types: Marijuana    Sexual activity: Yes     Partners: Male     Birth control/protection: OCP   Other Topics Concern    None   Social History Narrative    Always uses seat belt    Caffeine use    No Quaker beliefs     Social Drivers of  "Health     Financial Resource Strain: Not on file   Food Insecurity: No Food Insecurity (1/8/2024)    Nursing - Inadequate Food Risk Classification     Worried About Running Out of Food in the Last Year: Never true     Ran Out of Food in the Last Year: Never true     Ran Out of Food in the Last Year: Not on file   Transportation Needs: No Transportation Needs (1/8/2024)    PRAPARE - Transportation     Lack of Transportation (Medical): No     Lack of Transportation (Non-Medical): No   Physical Activity: Not on file   Stress: Not on file   Social Connections: Not on file   Intimate Partner Violence: Not At Risk (1/8/2024)    Humiliation, Afraid, Rape, and Kick questionnaire     Fear of Current or Ex-Partner: No     Emotionally Abused: No     Physically Abused: No     Sexually Abused: No   Housing Stability: Low Risk  (1/8/2024)    Housing Stability Vital Sign     Unable to Pay for Housing in the Last Year: No     Number of Times Moved in the Last Year: 1     Homeless in the Last Year: No     Family History   Problem Relation Age of Onset    Skin cancer Mother     Cancer Mother     No Known Problems Father     Diabetes Family        Review of Systems    Objective   Physical Exam  OBGyn Exam     Objective      /70 (BP Location: Right arm, Patient Position: Sitting)   Ht 5' 1.5\" (1.562 m)   Wt 96.2 kg (212 lb)   BMI 39.41 kg/m²     General:   alert and oriented, in no acute distress   Neck: normal to inspection and palpation   Breast: normal appearance, no masses or tenderness   Heart:    Lungs:    Abdomen: soft, non-tender, without masses or organomegaly   Vulva: normal   Vagina: Without erythema or lesions or discharge.  Normal   Cervix: Without lesions or discharge or cervicitis.  No Cervical motion tenderness   Uterus: top normal size, anteverted, non-tender   Adnexa: no mass, fullness, tenderness   Rectum: negative    Psych:  Normal mood and affect   Skin:  Without obvious lesions   Eyes: symmetric, with " normal movements and reactivity   Musculoskeletal:  Normal muscle tone and movements appreciated

## 2025-04-05 LAB
ALBUMIN SERPL-MCNC: 4.2 G/DL (ref 3.9–4.9)
ALP SERPL-CCNC: 107 IU/L (ref 44–121)
ALT SERPL-CCNC: 10 IU/L (ref 0–32)
AST SERPL-CCNC: 14 IU/L (ref 0–40)
BILIRUB SERPL-MCNC: 0.5 MG/DL (ref 0–1.2)
BUN SERPL-MCNC: 13 MG/DL (ref 6–20)
BUN/CREAT SERPL: 17 (ref 9–23)
CALCIUM SERPL-MCNC: 9.2 MG/DL (ref 8.7–10.2)
CHLORIDE SERPL-SCNC: 107 MMOL/L (ref 96–106)
CHOLEST SERPL-MCNC: 177 MG/DL (ref 100–199)
CHOLEST/HDLC SERPL: 3.8 RATIO (ref 0–4.4)
CO2 SERPL-SCNC: 20 MMOL/L (ref 20–29)
CREAT SERPL-MCNC: 0.78 MG/DL (ref 0.57–1)
EGFR: 101 ML/MIN/1.73
GLOBULIN SER-MCNC: 2.6 G/DL (ref 1.5–4.5)
GLUCOSE SERPL-MCNC: 95 MG/DL (ref 70–99)
HDLC SERPL-MCNC: 47 MG/DL
LDLC SERPL CALC-MCNC: 111 MG/DL (ref 0–99)
POTASSIUM SERPL-SCNC: 4 MMOL/L (ref 3.5–5.2)
PROT SERPL-MCNC: 6.8 G/DL (ref 6–8.5)
SL AMB VLDL CHOLESTEROL CALC: 19 MG/DL (ref 5–40)
SODIUM SERPL-SCNC: 140 MMOL/L (ref 134–144)
TRIGL SERPL-MCNC: 102 MG/DL (ref 0–149)
TSH SERPL DL<=0.005 MIU/L-ACNC: 1.78 UIU/ML (ref 0.45–4.5)

## 2025-04-06 ENCOUNTER — RESULTS FOLLOW-UP (OUTPATIENT)
Dept: FAMILY MEDICINE CLINIC | Facility: CLINIC | Age: 35
End: 2025-04-06